# Patient Record
Sex: FEMALE | Race: WHITE | Employment: OTHER | URBAN - METROPOLITAN AREA
[De-identification: names, ages, dates, MRNs, and addresses within clinical notes are randomized per-mention and may not be internally consistent; named-entity substitution may affect disease eponyms.]

---

## 2017-08-29 PROBLEM — M17.11 PRIMARY OSTEOARTHRITIS OF RIGHT KNEE: Status: ACTIVE | Noted: 2017-08-29

## 2018-04-19 ENCOUNTER — HOSPITAL ENCOUNTER (OUTPATIENT)
Dept: AUDIOLOGY | Age: 78
Discharge: HOME | End: 2018-04-19
Payer: MEDICAID

## 2018-04-19 PROCEDURE — 9990000010 HC NO CHARGE VISIT

## 2018-06-01 ENCOUNTER — HOSPITAL ENCOUNTER (OUTPATIENT)
Dept: AUDIOLOGY | Age: 78
Discharge: HOME | End: 2018-06-01
Payer: MEDICAID

## 2018-06-01 PROCEDURE — V5241 DISPENSING FEE, MONAURAL: HCPCS | Performed by: AUDIOLOGIST

## 2018-06-01 PROCEDURE — V5160 DISPENSING FEE BINAURAL: HCPCS

## 2018-06-01 PROCEDURE — V5261 HEARING AID, DIGIT, BIN, BTE: HCPCS | Performed by: AUDIOLOGIST

## 2018-06-13 ENCOUNTER — HOSPITAL ENCOUNTER (INPATIENT)
Age: 78
LOS: 2 days | Discharge: HOME | DRG: 393 | End: 2018-06-15
Attending: EMERGENCY MEDICINE | Admitting: INTERNAL MEDICINE
Payer: MEDICARE

## 2018-06-13 ENCOUNTER — APPOINTMENT (OUTPATIENT)
Dept: CT IMAGING | Age: 78
DRG: 393 | End: 2018-06-13
Payer: MEDICARE

## 2018-06-13 ENCOUNTER — APPOINTMENT (OUTPATIENT)
Dept: GENERAL RADIOLOGY | Age: 78
DRG: 393 | End: 2018-06-13
Payer: MEDICARE

## 2018-06-13 DIAGNOSIS — K62.5 RECTAL BLEEDING: Primary | ICD-10-CM

## 2018-06-13 PROBLEM — K92.2 GI BLEED: Status: ACTIVE | Noted: 2018-06-13

## 2018-06-13 PROBLEM — M17.11 PRIMARY OSTEOARTHRITIS OF RIGHT KNEE: Status: RESOLVED | Noted: 2017-08-29 | Resolved: 2018-06-13

## 2018-06-13 LAB
ABO/RH: NORMAL
ALBUMIN SERPL-MCNC: 3.8 G/DL (ref 3.5–5.2)
ALP BLD-CCNC: 152 U/L (ref 35–104)
ALT SERPL-CCNC: 23 U/L (ref 0–32)
ANION GAP SERPL CALCULATED.3IONS-SCNC: 13 MMOL/L (ref 7–16)
ANTIBODY SCREEN: NORMAL
APTT: 25.6 SEC (ref 24.5–35.1)
AST SERPL-CCNC: 27 U/L (ref 0–31)
BASOPHILS ABSOLUTE: 0.07 E9/L (ref 0–0.2)
BASOPHILS RELATIVE PERCENT: 0.7 % (ref 0–2)
BILIRUB SERPL-MCNC: 0.3 MG/DL (ref 0–1.2)
BUN BLDV-MCNC: 25 MG/DL (ref 8–23)
CALCIUM SERPL-MCNC: 9.4 MG/DL (ref 8.6–10.2)
CHLORIDE BLD-SCNC: 101 MMOL/L (ref 98–107)
CO2: 21 MMOL/L (ref 22–29)
CREAT SERPL-MCNC: 0.9 MG/DL (ref 0.5–1)
EOSINOPHILS ABSOLUTE: 0.07 E9/L (ref 0.05–0.5)
EOSINOPHILS RELATIVE PERCENT: 0.7 % (ref 0–6)
GFR AFRICAN AMERICAN: >60
GFR NON-AFRICAN AMERICAN: >60 ML/MIN/1.73
GLUCOSE BLD-MCNC: 109 MG/DL (ref 74–109)
HCT VFR BLD CALC: 40.5 % (ref 34–48)
HEMOGLOBIN: 13.5 G/DL (ref 11.5–15.5)
IMMATURE GRANULOCYTES #: 0.03 E9/L
IMMATURE GRANULOCYTES %: 0.3 % (ref 0–5)
INR BLD: 1.1
LIPASE: 10 U/L (ref 13–60)
LYMPHOCYTES ABSOLUTE: 1.92 E9/L (ref 1.5–4)
LYMPHOCYTES RELATIVE PERCENT: 18.3 % (ref 20–42)
MCH RBC QN AUTO: 30.3 PG (ref 26–35)
MCHC RBC AUTO-ENTMCNC: 33.3 % (ref 32–34.5)
MCV RBC AUTO: 90.8 FL (ref 80–99.9)
MONOCYTES ABSOLUTE: 0.77 E9/L (ref 0.1–0.95)
MONOCYTES RELATIVE PERCENT: 7.3 % (ref 2–12)
NEUTROPHILS ABSOLUTE: 7.65 E9/L (ref 1.8–7.3)
NEUTROPHILS RELATIVE PERCENT: 72.7 % (ref 43–80)
PDW BLD-RTO: 12.5 FL (ref 11.5–15)
PLATELET # BLD: 176 E9/L (ref 130–450)
PMV BLD AUTO: 10.4 FL (ref 7–12)
POTASSIUM SERPL-SCNC: 4.7 MMOL/L (ref 3.5–5)
PROTHROMBIN TIME: 12.6 SEC (ref 9.3–12.4)
RBC # BLD: 4.46 E12/L (ref 3.5–5.5)
SODIUM BLD-SCNC: 135 MMOL/L (ref 132–146)
TOTAL PROTEIN: 7 G/DL (ref 6.4–8.3)
TROPONIN: <0.01 NG/ML (ref 0–0.03)
WBC # BLD: 10.5 E9/L (ref 4.5–11.5)

## 2018-06-13 PROCEDURE — 2060000000 HC ICU INTERMEDIATE R&B

## 2018-06-13 PROCEDURE — 2580000003 HC RX 258: Performed by: INTERNAL MEDICINE

## 2018-06-13 PROCEDURE — 2580000003 HC RX 258: Performed by: EMERGENCY MEDICINE

## 2018-06-13 PROCEDURE — 6370000000 HC RX 637 (ALT 250 FOR IP): Performed by: EMERGENCY MEDICINE

## 2018-06-13 PROCEDURE — G0378 HOSPITAL OBSERVATION PER HR: HCPCS

## 2018-06-13 PROCEDURE — 6360000002 HC RX W HCPCS: Performed by: INTERNAL MEDICINE

## 2018-06-13 PROCEDURE — 74176 CT ABD & PELVIS W/O CONTRAST: CPT

## 2018-06-13 PROCEDURE — 86850 RBC ANTIBODY SCREEN: CPT

## 2018-06-13 PROCEDURE — 86901 BLOOD TYPING SEROLOGIC RH(D): CPT

## 2018-06-13 PROCEDURE — 6370000000 HC RX 637 (ALT 250 FOR IP): Performed by: INTERNAL MEDICINE

## 2018-06-13 PROCEDURE — 99285 EMERGENCY DEPT VISIT HI MDM: CPT

## 2018-06-13 PROCEDURE — 71045 X-RAY EXAM CHEST 1 VIEW: CPT

## 2018-06-13 PROCEDURE — 93005 ELECTROCARDIOGRAM TRACING: CPT

## 2018-06-13 PROCEDURE — 85610 PROTHROMBIN TIME: CPT

## 2018-06-13 PROCEDURE — 80053 COMPREHEN METABOLIC PANEL: CPT

## 2018-06-13 PROCEDURE — 86900 BLOOD TYPING SEROLOGIC ABO: CPT

## 2018-06-13 PROCEDURE — 85025 COMPLETE CBC W/AUTO DIFF WBC: CPT

## 2018-06-13 PROCEDURE — 84484 ASSAY OF TROPONIN QUANT: CPT

## 2018-06-13 PROCEDURE — 83690 ASSAY OF LIPASE: CPT

## 2018-06-13 PROCEDURE — 96374 THER/PROPH/DIAG INJ IV PUSH: CPT

## 2018-06-13 PROCEDURE — 85730 THROMBOPLASTIN TIME PARTIAL: CPT

## 2018-06-13 RX ORDER — PROPRANOLOL HYDROCHLORIDE 20 MG/1
20 TABLET ORAL 3 TIMES DAILY
Status: DISCONTINUED | OUTPATIENT
Start: 2018-06-13 | End: 2018-06-15 | Stop reason: HOSPADM

## 2018-06-13 RX ORDER — LISINOPRIL 10 MG/1
10 TABLET ORAL DAILY
Status: DISCONTINUED | OUTPATIENT
Start: 2018-06-14 | End: 2018-06-15

## 2018-06-13 RX ORDER — DIAZEPAM 5 MG/1
10 TABLET ORAL EVERY 12 HOURS PRN
Status: DISCONTINUED | OUTPATIENT
Start: 2018-06-13 | End: 2018-06-15 | Stop reason: HOSPADM

## 2018-06-13 RX ORDER — ACETAMINOPHEN 325 MG/1
650 TABLET ORAL EVERY 4 HOURS PRN
Status: DISCONTINUED | OUTPATIENT
Start: 2018-06-13 | End: 2018-06-15 | Stop reason: HOSPADM

## 2018-06-13 RX ORDER — VALSARTAN 160 MG/1
160 TABLET ORAL DAILY
Status: DISCONTINUED | OUTPATIENT
Start: 2018-06-14 | End: 2018-06-15 | Stop reason: HOSPADM

## 2018-06-13 RX ORDER — SODIUM CHLORIDE 0.9 % (FLUSH) 0.9 %
10 SYRINGE (ML) INJECTION PRN
Status: DISCONTINUED | OUTPATIENT
Start: 2018-06-13 | End: 2018-06-15 | Stop reason: HOSPADM

## 2018-06-13 RX ORDER — PHENYTOIN SODIUM 100 MG/1
100 CAPSULE, EXTENDED RELEASE ORAL 2 TIMES DAILY
Status: DISCONTINUED | OUTPATIENT
Start: 2018-06-13 | End: 2018-06-15 | Stop reason: HOSPADM

## 2018-06-13 RX ORDER — SODIUM CHLORIDE 9 MG/ML
INJECTION, SOLUTION INTRAVENOUS CONTINUOUS
Status: DISCONTINUED | OUTPATIENT
Start: 2018-06-13 | End: 2018-06-15

## 2018-06-13 RX ORDER — 0.9 % SODIUM CHLORIDE 0.9 %
500 INTRAVENOUS SOLUTION INTRAVENOUS ONCE
Status: COMPLETED | OUTPATIENT
Start: 2018-06-13 | End: 2018-06-13

## 2018-06-13 RX ORDER — PANTOPRAZOLE SODIUM 40 MG/10ML
40 INJECTION, POWDER, LYOPHILIZED, FOR SOLUTION INTRAVENOUS DAILY
Status: DISCONTINUED | OUTPATIENT
Start: 2018-06-14 | End: 2018-06-15 | Stop reason: HOSPADM

## 2018-06-13 RX ORDER — LISINOPRIL 10 MG/1
10 TABLET ORAL DAILY
Status: ON HOLD | COMMUNITY
End: 2018-06-15 | Stop reason: HOSPADM

## 2018-06-13 RX ORDER — HYDROCHLOROTHIAZIDE 25 MG/1
25 TABLET ORAL DAILY
Status: DISCONTINUED | OUTPATIENT
Start: 2018-06-14 | End: 2018-06-15 | Stop reason: HOSPADM

## 2018-06-13 RX ORDER — PHENYTOIN SODIUM 100 MG/1
100 CAPSULE, EXTENDED RELEASE ORAL ONCE
Status: COMPLETED | OUTPATIENT
Start: 2018-06-13 | End: 2018-06-13

## 2018-06-13 RX ORDER — ONDANSETRON 2 MG/ML
4 INJECTION INTRAMUSCULAR; INTRAVENOUS EVERY 6 HOURS PRN
Status: DISCONTINUED | OUTPATIENT
Start: 2018-06-13 | End: 2018-06-15 | Stop reason: HOSPADM

## 2018-06-13 RX ORDER — SODIUM CHLORIDE 0.9 % (FLUSH) 0.9 %
10 SYRINGE (ML) INJECTION EVERY 12 HOURS SCHEDULED
Status: DISCONTINUED | OUTPATIENT
Start: 2018-06-13 | End: 2018-06-15 | Stop reason: HOSPADM

## 2018-06-13 RX ORDER — ZOLPIDEM TARTRATE 5 MG/1
5 TABLET ORAL NIGHTLY PRN
Status: ON HOLD | COMMUNITY
End: 2019-07-01

## 2018-06-13 RX ORDER — LANOLIN ALCOHOL/MO/W.PET/CERES
3 CREAM (GRAM) TOPICAL NIGHTLY PRN
Status: ON HOLD | COMMUNITY
End: 2018-06-13 | Stop reason: CLARIF

## 2018-06-13 RX ORDER — LEVOTHYROXINE SODIUM 0.03 MG/1
25 TABLET ORAL DAILY
Status: DISCONTINUED | OUTPATIENT
Start: 2018-06-14 | End: 2018-06-15 | Stop reason: HOSPADM

## 2018-06-13 RX ORDER — OXYCODONE HYDROCHLORIDE 15 MG/1
15 TABLET ORAL EVERY 6 HOURS
Status: DISCONTINUED | OUTPATIENT
Start: 2018-06-13 | End: 2018-06-15 | Stop reason: HOSPADM

## 2018-06-13 RX ADMIN — Medication 10 ML: at 23:50

## 2018-06-13 RX ADMIN — SODIUM CHLORIDE 500 ML: 9 INJECTION, SOLUTION INTRAVENOUS at 19:20

## 2018-06-13 RX ADMIN — SODIUM CHLORIDE: 9 INJECTION, SOLUTION INTRAVENOUS at 23:40

## 2018-06-13 RX ADMIN — ONDANSETRON HYDROCHLORIDE 4 MG: 2 INJECTION, SOLUTION INTRAMUSCULAR; INTRAVENOUS at 23:48

## 2018-06-13 RX ADMIN — DIAZEPAM 10 MG: 5 TABLET ORAL at 23:40

## 2018-06-13 RX ADMIN — PROPRANOLOL HYDROCHLORIDE 20 MG: 20 TABLET ORAL at 23:39

## 2018-06-13 RX ADMIN — PHENYTOIN SODIUM 100 MG: 100 CAPSULE ORAL at 22:20

## 2018-06-13 ASSESSMENT — PAIN DESCRIPTION - LOCATION
LOCATION: ABDOMEN
LOCATION: ABDOMEN;BACK

## 2018-06-13 ASSESSMENT — PAIN DESCRIPTION - ONSET: ONSET: ON-GOING

## 2018-06-13 ASSESSMENT — PAIN SCALES - GENERAL
PAINLEVEL_OUTOF10: 3
PAINLEVEL_OUTOF10: 8

## 2018-06-13 ASSESSMENT — PAIN DESCRIPTION - PAIN TYPE: TYPE: ACUTE PAIN

## 2018-06-13 ASSESSMENT — PAIN DESCRIPTION - ORIENTATION
ORIENTATION: LOWER
ORIENTATION: LOWER

## 2018-06-13 ASSESSMENT — PAIN DESCRIPTION - FREQUENCY
FREQUENCY: INTERMITTENT
FREQUENCY: INTERMITTENT

## 2018-06-13 ASSESSMENT — PAIN DESCRIPTION - DESCRIPTORS
DESCRIPTORS: DISCOMFORT
DESCRIPTORS: SHARP

## 2018-06-14 ENCOUNTER — ANESTHESIA EVENT (OUTPATIENT)
Dept: ENDOSCOPY | Age: 78
DRG: 393 | End: 2018-06-14
Payer: MEDICARE

## 2018-06-14 LAB
ALBUMIN SERPL-MCNC: 3.4 G/DL (ref 3.5–5.2)
ALP BLD-CCNC: 130 U/L (ref 35–104)
ALT SERPL-CCNC: 17 U/L (ref 0–32)
ANION GAP SERPL CALCULATED.3IONS-SCNC: 13 MMOL/L (ref 7–16)
AST SERPL-CCNC: 20 U/L (ref 0–31)
BASOPHILS ABSOLUTE: 0.07 E9/L (ref 0–0.2)
BASOPHILS RELATIVE PERCENT: 0.8 % (ref 0–2)
BILIRUB SERPL-MCNC: 0.3 MG/DL (ref 0–1.2)
BUN BLDV-MCNC: 18 MG/DL (ref 8–23)
CALCIUM SERPL-MCNC: 8.8 MG/DL (ref 8.6–10.2)
CHLORIDE BLD-SCNC: 105 MMOL/L (ref 98–107)
CO2: 22 MMOL/L (ref 22–29)
CREAT SERPL-MCNC: 0.9 MG/DL (ref 0.5–1)
EOSINOPHILS ABSOLUTE: 0.1 E9/L (ref 0.05–0.5)
EOSINOPHILS RELATIVE PERCENT: 1.1 % (ref 0–6)
GFR AFRICAN AMERICAN: >60
GFR NON-AFRICAN AMERICAN: >60 ML/MIN/1.73
GLUCOSE BLD-MCNC: 101 MG/DL (ref 74–109)
HCT VFR BLD CALC: 34.5 % (ref 34–48)
HEMOGLOBIN: 11.2 G/DL (ref 11.5–15.5)
IMMATURE GRANULOCYTES #: 0.03 E9/L
IMMATURE GRANULOCYTES %: 0.3 % (ref 0–5)
INR BLD: 1.1
LYMPHOCYTES ABSOLUTE: 2.52 E9/L (ref 1.5–4)
LYMPHOCYTES RELATIVE PERCENT: 27.4 % (ref 20–42)
MAGNESIUM: 1.7 MG/DL (ref 1.6–2.6)
MCH RBC QN AUTO: 29.9 PG (ref 26–35)
MCHC RBC AUTO-ENTMCNC: 32.5 % (ref 32–34.5)
MCV RBC AUTO: 92.2 FL (ref 80–99.9)
MONOCYTES ABSOLUTE: 0.88 E9/L (ref 0.1–0.95)
MONOCYTES RELATIVE PERCENT: 9.6 % (ref 2–12)
NEUTROPHILS ABSOLUTE: 5.6 E9/L (ref 1.8–7.3)
NEUTROPHILS RELATIVE PERCENT: 60.8 % (ref 43–80)
PDW BLD-RTO: 12.6 FL (ref 11.5–15)
PLATELET # BLD: 145 E9/L (ref 130–450)
PMV BLD AUTO: 10.5 FL (ref 7–12)
POTASSIUM SERPL-SCNC: 3.8 MMOL/L (ref 3.5–5)
PROTHROMBIN TIME: 12.7 SEC (ref 9.3–12.4)
RBC # BLD: 3.74 E12/L (ref 3.5–5.5)
SODIUM BLD-SCNC: 140 MMOL/L (ref 132–146)
TOTAL PROTEIN: 6.1 G/DL (ref 6.4–8.3)
WBC # BLD: 9.2 E9/L (ref 4.5–11.5)

## 2018-06-14 PROCEDURE — 83735 ASSAY OF MAGNESIUM: CPT

## 2018-06-14 PROCEDURE — 2060000000 HC ICU INTERMEDIATE R&B

## 2018-06-14 PROCEDURE — 6370000000 HC RX 637 (ALT 250 FOR IP): Performed by: STUDENT IN AN ORGANIZED HEALTH CARE EDUCATION/TRAINING PROGRAM

## 2018-06-14 PROCEDURE — 6370000000 HC RX 637 (ALT 250 FOR IP): Performed by: INTERNAL MEDICINE

## 2018-06-14 PROCEDURE — 80053 COMPREHEN METABOLIC PANEL: CPT

## 2018-06-14 PROCEDURE — 85610 PROTHROMBIN TIME: CPT

## 2018-06-14 PROCEDURE — 6360000002 HC RX W HCPCS: Performed by: INTERNAL MEDICINE

## 2018-06-14 PROCEDURE — C9113 INJ PANTOPRAZOLE SODIUM, VIA: HCPCS | Performed by: INTERNAL MEDICINE

## 2018-06-14 PROCEDURE — G0378 HOSPITAL OBSERVATION PER HR: HCPCS

## 2018-06-14 PROCEDURE — 36415 COLL VENOUS BLD VENIPUNCTURE: CPT

## 2018-06-14 PROCEDURE — 2580000003 HC RX 258: Performed by: INTERNAL MEDICINE

## 2018-06-14 PROCEDURE — 96376 TX/PRO/DX INJ SAME DRUG ADON: CPT

## 2018-06-14 PROCEDURE — 85025 COMPLETE CBC W/AUTO DIFF WBC: CPT

## 2018-06-14 PROCEDURE — 96375 TX/PRO/DX INJ NEW DRUG ADDON: CPT

## 2018-06-14 RX ADMIN — PROPRANOLOL HYDROCHLORIDE 20 MG: 20 TABLET ORAL at 20:54

## 2018-06-14 RX ADMIN — ONDANSETRON HYDROCHLORIDE 4 MG: 2 INJECTION, SOLUTION INTRAMUSCULAR; INTRAVENOUS at 15:42

## 2018-06-14 RX ADMIN — LEVOTHYROXINE SODIUM 25 MCG: 25 TABLET ORAL at 06:19

## 2018-06-14 RX ADMIN — BISACODYL 10 MG: 5 TABLET, COATED ORAL at 20:56

## 2018-06-14 RX ADMIN — OXYCODONE HYDROCHLORIDE 15 MG: 15 TABLET ORAL at 10:57

## 2018-06-14 RX ADMIN — OXYCODONE HYDROCHLORIDE 15 MG: 15 TABLET ORAL at 23:32

## 2018-06-14 RX ADMIN — ACETAMINOPHEN 650 MG: 325 TABLET ORAL at 06:19

## 2018-06-14 RX ADMIN — SODIUM CHLORIDE: 9 INJECTION, SOLUTION INTRAVENOUS at 13:17

## 2018-06-14 RX ADMIN — OXYCODONE HYDROCHLORIDE 15 MG: 15 TABLET ORAL at 17:54

## 2018-06-14 RX ADMIN — LISINOPRIL 10 MG: 10 TABLET ORAL at 08:40

## 2018-06-14 RX ADMIN — PROPRANOLOL HYDROCHLORIDE 20 MG: 20 TABLET ORAL at 08:41

## 2018-06-14 RX ADMIN — MAGESIUM CITRATE 600 ML: 1.75 LIQUID ORAL at 19:19

## 2018-06-14 RX ADMIN — VALSARTAN 160 MG: 160 TABLET ORAL at 08:41

## 2018-06-14 RX ADMIN — MAGESIUM CITRATE 600 ML: 1.75 LIQUID ORAL at 15:37

## 2018-06-14 RX ADMIN — Medication 10 ML: at 08:40

## 2018-06-14 RX ADMIN — PROPRANOLOL HYDROCHLORIDE 20 MG: 20 TABLET ORAL at 15:36

## 2018-06-14 RX ADMIN — PANTOPRAZOLE SODIUM 40 MG: 40 INJECTION, POWDER, FOR SOLUTION INTRAVENOUS at 08:40

## 2018-06-14 RX ADMIN — BISACODYL 10 MG: 5 TABLET, COATED ORAL at 17:54

## 2018-06-14 RX ADMIN — PHENYTOIN SODIUM 100 MG: 100 CAPSULE ORAL at 08:40

## 2018-06-14 RX ADMIN — PHENYTOIN SODIUM 100 MG: 100 CAPSULE ORAL at 20:56

## 2018-06-14 ASSESSMENT — ENCOUNTER SYMPTOMS: SHORTNESS OF BREATH: 0

## 2018-06-14 ASSESSMENT — PAIN DESCRIPTION - LOCATION
LOCATION: BACK
LOCATION: ABDOMEN
LOCATION: ABDOMEN;BACK

## 2018-06-14 ASSESSMENT — PAIN DESCRIPTION - DESCRIPTORS
DESCRIPTORS: ACHING;DISCOMFORT
DESCRIPTORS: ACHING;SORE;DISCOMFORT

## 2018-06-14 ASSESSMENT — PAIN SCALES - GENERAL
PAINLEVEL_OUTOF10: 2
PAINLEVEL_OUTOF10: 4
PAINLEVEL_OUTOF10: 7
PAINLEVEL_OUTOF10: 6
PAINLEVEL_OUTOF10: 2
PAINLEVEL_OUTOF10: 7

## 2018-06-14 ASSESSMENT — PAIN DESCRIPTION - PAIN TYPE
TYPE: CHRONIC PAIN
TYPE: ACUTE PAIN;CHRONIC PAIN
TYPE: ACUTE PAIN

## 2018-06-14 ASSESSMENT — PAIN DESCRIPTION - FREQUENCY: FREQUENCY: INTERMITTENT

## 2018-06-14 ASSESSMENT — PAIN DESCRIPTION - ORIENTATION
ORIENTATION: LOWER
ORIENTATION: LOWER

## 2018-06-14 ASSESSMENT — PAIN DESCRIPTION - ONSET: ONSET: ON-GOING

## 2018-06-14 NOTE — CARE COORDINATION
Social work / Discharge Planning:       Social work met with patient for initial assessment. She lives with her  in a apartment using no DME (she owns a cane). She has used [facility] and had a past stay at [facility]. Patient has assistance in her home with caregivers arranged by  [address] [place] [address] on Aging twice per [facility] for 3 hours. Per patient, she has already informed her caregivers that she is in the hospital.  She currently denies having any discharge needs. Social work will follow.  Electronically signed by GUERO Parson on 6/14/2018 at 1:31 PM

## 2018-06-14 NOTE — CONSULTS
multiple    CERVICAL FUSION  ?   SECTION      CHOLECYSTECTOMY      lap    COLONOSCOPY      EYE SURGERY Bilateral     cataract extraction    JOINT REPLACEMENT  2010    left    KNEE ARTHROPLASTY Right 2017    Total right knee arthroplasty    LUMBAR LAMINECTOMY  1985   [address]      Neck    SPINE SURGERY      synovial cyst    SPINE SURGERY  2012    rebuilt talibone    TONSILLECTOMY         Medications Prior to Admission:    Prior to Admission medications    Medication Sig Start Date End Date Taking? Authorizing Provider   lisinopril (PRINIVIL;ZESTRIL) 10 MG tablet Take 10 mg by mouth daily   Yes Historical Provider, MD   zolpidem (AMBIEN) 5 MG tablet Take 5 mg by mouth nightly as needed for Sleep. .   Yes Historical Provider, MD   OxyCODONE HCl (OXYCONTIN PO) Take 15 mg by mouth every 6 hours     Historical Provider, MD   ondansetron (ZOFRAN ODT) 4 MG disintegrating tablet Take 1 tablet by mouth every 8 hours as needed for Nausea or Vomiting 10/31/17 10/31/18  Rupert Bates DO   omeprazole (PRILOSEC) 20 MG delayed release capsule Take 1 capsule by mouth every 12 hours for 14 days 10/31/17 11/14/17  Rupert Bates DO   diazepam (VALIUM) 10 MG tablet Take 1 tablet by mouth every 12 hours as needed for Anxiety 9/10/17   Wendall Spatz, MD   phenytoin (DILANTIN) 100 MG ER capsule Take 1 capsule by mouth 2 times daily 9/10/17   Wendall Spatz, MD   propranolol (INDERAL) 20 MG tablet Take 20 mg by mouth 3 times daily Instructed to take morning of surgery with a sip of water    Historical Provider, MD   valsartan (DIOVAN) 160 MG tablet Take 160 mg by mouth daily    Historical Provider, MD   hydrochlorothiazide (HYDRODIURIL) 25 MG tablet Take 25 mg by mouth daily  17   Historical Provider, MD   aspirin 81 MG EC tablet Take 1 tablet by mouth 2 times daily 16   Kp Guzman MD   atorvastatin (LIPITOR) 10 MG tablet Function  Recent Labs      18   1822   LIPASE  10*   BILITOT  0.3   AST  27   ALT  23   ALKPHOS  152*   PROT  7.0   LABALBU  3.8     No results for input(s): LACTATE in the last 72 hours. Recent Labs      18   1822   INR  1.1       RADIOLOGY  Ct Abdomen Pelvis Wo Contrast    Result Date: 2018  Patient MRN:  [id number] : 1940 Age: 69 years Gender: Female Order Date:  2018 6:15 PM EXAM: CT ABDOMEN PELVIS WO CONTRAST NUMBER OF IMAGES:  364 INDICATION:  ABDOMINAL PAIN  COMPARISON: None TECHNIQUE: Helical CT scan of the abdomen and pelvis was performed from the domes of the diaphragms through the pelvis. No IV or oral contrast was administered. Coronal  and sagittal reconstructions were reviewed. Low-dose CT  acquisition technique included one of following options; 1 . Automated exposure control, 2. Adjustment of MA and or KV according to patient's size or 3. Use of iterative reconstruction. FINDINGS: The visible lower lung fields are clear. There is a 5 mm calcified granuloma at the base of the left lower lobe anteriorly. There is a small hiatal hernia. The liver and spleen appear normal in size and texture with no focal lesions seen. The gallbladder is surgically absent. There is severe fatty infiltration of the pancreas. The bile ducts are nondilated. No adrenal masses are seen. The kidneys are mildly atrophic but no renal masses, calculi or hydronephrosis is seen. The abdominal aorta is mildly atherosclerotic normal in caliber. No retroperitoneal or pelvic masses or lymphadenopathy is seen. The urinary bladder appears unremarkable. The uterus is present and atrophic. No free fluid is identified. There were multiple diverticuli involving the distal descending and sigmoid colon. No inflammatory changes are seen to indicate acute diverticulitis. The remainder of the colon and the small bowel loops appear normal. There is no bowel obstruction. No free intraperitoneal air.  There are

## 2018-06-14 NOTE — PLAN OF CARE
Problem: Falls - Risk of:  Goal: Will remain free from falls  Will remain free from falls   Outcome: Met This Shift    Goal: Absence of physical injury  Absence of physical injury   Outcome: Met This Shift

## 2018-06-14 NOTE — H&P
History and Physical      CHIEF COMPLAINT:  Rectal bleeding      HISTORY OF PRESENT ILLNESS:      The patient is a 69 y.o. female patient of Dr Gerri Crespo who presents with rectal bleeding from home. She states that she has been constipated for the last 3 days. On the day of admission she had a hard bowel movement and developed bright red rectal bleeding. This was followed by nausea and vomiting. She was brought to the hospital for evaluation. She states she had endoscopy 4 years ago. Results were apparently normal. She denies any fever or chills diarrhea hematemesis or melena. She has abdominal pain located in the left lower quadrant radiating into the left flank. Her symptoms were sudden in onset and severe. The bleeding appears to have been exacerbated by constipation. She was found to be Hemoccult positive in the emergency room. Past Medical History:    Past Medical History:   Diagnosis Date    Anxiety and depression 7/13/2016    Arthritis     osteo    Asthma     has chronic couch    Chronic back pain     Chronic kidney disease (CKD), stage II (mild) 04/19/2012    Creatinine around 1.3 to 1.4 in April 2012 secondary to IV antibiotics following back surgery.  Chronic osteomyelitis of lumbar spine ([address]     note on chart from Dr. Elmo Fernandes dated 8/17/2017    Foot pain     after back surgery / chronic    HTN (hypertension), benign 1/10/2015    Hx of blood clots     2011 / DVT, PE    Hx of migraines     Hyperlipidemia     Hypothyroidism     Hypothyroidism 7/13/2016    Neuropathy of leg     bilateral    Other postoperative infection 08/2011    Was on Vancomycin following the surgery for the removal of lumbar cysts.  Patient on antibiotics for life     Radiculopathy, lumbar region 01/16/2012    Seizures ([address]     last seizure 11/2016 / gran mal    Sleeping difficulties     takes [place]    Spinal stenosis of lumbar region     Synovial cyst of lumbar spine 07/05/2011    Thrombocytopenia ([address] Gabapentin; Lyrica [pregabalin]; and Skelaxin [metaxalone]    Social History:    reports that she has never smoked. She has never used smokeless tobacco. She reports that she does not drink alcohol or use drugs. Family History:   family history includes Cirrhosis in her brother; Heart Attack in her father; Other in her mother. REVIEW OF SYSTEMS    Constitutional: negative for chills and fevers  Eyes: negative for icterus and redness  Ears, nose, mouth, throat, and face: negative for epistaxis, hearing loss, nasal congestion, sore mouth, sore throat and tinnitus  Respiratory: negative for cough and hemoptysis  Cardiovascular: negative for chest pain and palpitations  Gastrointestinal: positive for abdominal pain, constipation and vomiting, negative for diarrhea and jaundice  Genitourinary:negative for dysuria and frequency  Integument/breast: negative for pruritus and rash  Hematologic/lymphatic: negative for bleeding and easy bruising  Musculoskeletal:positive for back pain, negative for neck pain  Neurological: negative for coordination problems and dizziness  Behavioral/Psych: Positive for anxiety and depression  Endocrine: negative for temperature intolerance  Allergic/Immunologic: negative for anaphylaxis and angioedema    PHYSICAL EXAM:    Vitals:  /60   Pulse 80   Temp 98.3 °F (36.8 °C) (Oral)   Resp 18   Ht 5' (1.524 m)   Wt 181 lb 4.8 oz (82.2 kg)   SpO2 96%   BMI 35.41 kg/m²     General appearance: alert, appears stated age and cooperative  Head: Normocephalic, without obvious abnormality, atraumatic  Eyes: conjunctivae/corneas clear. PERRL, EOM's intact. Fundi benign. Ears: normal TM's and external ear canals both ears  Nose: Nares normal. Septum midline. Mucosa normal. No drainage or sinus tenderness.   Throat: lips, mucosa, and tongue normal; teeth and gums normal  Neck: no adenopathy, no carotid bruit, no JVD, supple, symmetrical, trachea midline and thyroid not enlarged, symmetric, no tenderness/mass/nodules  Lungs: clear to auscultation bilaterally  Heart: regular rate and rhythm, S1, S2 normal, no murmur, click, rub or gallop  Abdomen: Soft, tender left lower quadrant, no guarding or rebound, bowel sounds hypoactive, no organomegaly or masses  Extremities: extremities normal, atraumatic, no cyanosis or edema  Pulses: 2+ and symmetric  Skin: Skin color, texture, turgor normal. No rashes or lesions  Neurologic: Grossly normal    LABS:    CBC with Differential:    Lab Results   Component Value Date    WBC 9.2 06/14/2018    RBC 3.74 06/14/2018    HGB 11.2 06/14/2018    HCT 34.5 06/14/2018     06/14/2018    MCV 92.2 06/14/2018    MCH 29.9 06/14/2018    MCHC 32.5 06/14/2018    RDW 12.6 06/14/2018    SEGSPCT 54 07/24/2011    LYMPHOPCT 27.4 06/14/2018    MONOPCT 9.6 06/14/2018    BASOPCT 0.8 06/14/2018    MONOSABS 0.88 06/14/2018    LYMPHSABS 2.52 06/14/2018    EOSABS 0.10 06/14/2018    BASOSABS 0.07 06/14/2018     CMP:    Lab Results   Component Value Date     06/14/2018    K 3.8 06/14/2018     06/14/2018    CO2 22 06/14/2018    BUN 18 06/14/2018    CREATININE 0.9 06/14/2018    GFRAA >60 06/14/2018    LABGLOM >60 06/14/2018    GLUCOSE 101 06/14/2018    GLUCOSE 105 07/24/2011    PROT 6.1 06/14/2018    LABALBU 3.4 06/14/2018    LABALBU 3.3 07/24/2011    CALCIUM 8.8 06/14/2018    BILITOT 0.3 06/14/2018    ALKPHOS 130 06/14/2018    AST 20 06/14/2018    ALT 17 06/14/2018     Hepatic Function Panel:    Lab Results   Component Value Date    ALKPHOS 130 06/14/2018    ALT 17 06/14/2018    AST 20 06/14/2018    PROT 6.1 06/14/2018    BILITOT 0.3 06/14/2018    BILIDIR <0.2 12/10/2016    IBILI 0.0 12/10/2016    LABALBU 3.4 06/14/2018    LABALBU 3.3 07/24/2011     Troponin:    Lab Results   Component Value Date    TROPONINI <0.01 06/13/2018     Results for Elba Perea (MRN [id number]) as of 6/14/2018 09:19   Ref.  Range 6/13/2018 18:22   Prothrombin Time Latest Ref Range: 9.3 - 12.4 sec 12.6 bladder appears unremarkable. The   uterus is present and atrophic. No free fluid is identified.       There were multiple diverticuli involving the distal descending and   sigmoid colon. No inflammatory changes are seen to indicate acute   diverticulitis. The remainder of the colon and the small bowel loops   appear normal. There is no bowel obstruction. No free intraperitoneal   air.       There are postoperative changes involving the lumbar spine status post   laminectomies and posterior spinal fusion from L4 to S1. There are   posterior fixation rods and interpedicular screws in place as well as   intervertebral spacers. There is moderately severe degenerative disc   disease at L1-2 and L2-3 with associated spondylosis. The bones are   diffusely osteopenic.       CONCLUSION:       1. No acute intra-abdominal abnormality identified. 2. Status post cholecystectomy and severe fatty infiltration of the   pancreas. 3. Mild diverticulosis of the distal colon without evidence of acute   diverticulitis. 4. Mildly atrophic kidneys but no evidence of obstructive uropathy or   stone disease. 5. Small calcified granuloma in the left lower lobe. 6. Postoperative changes involving the lower lumbar spine.      6/13/2018  7:31 PM - Jose, Mhy Incoming Ekg Results From [facility]     Component Results     Component Value Ref Range & Units Status Collected Lab   Ventricular Rate 86  BPM Incomplete 06/13/2018  7:15 PM [facility]   Atrial Rate 86  BPM Incomplete 06/13/2018  7:15 PM [facility]   P-R Interval 178  ms Incomplete 06/13/2018  7:15 PM [facility]   QRS Duration 92  ms Incomplete 06/13/2018  7:15 PM [facility]   Q-T Interval 380  ms Incomplete 06/13/2018  7:15 PM [facility]   QTc Calculation (Bazett) 454  ms Incomplete 06/13/2018  7:15 PM [facility]   P Axis 27  degrees Incomplete 06/13/2018  7:15 PM [facility]   R Axis 16  degrees Incomplete 06/13/2018  7:15 PM [facility]   T Axis 36  degrees Incomplete 06/13/2018  7:15 PM [facility]

## 2018-06-14 NOTE — PROGRESS NOTES
Seen/examined  Medical records reviewed  Current medication list reviewed  Labs reviewed  Heart RRR  Lungs CTA b/l  Abdomen is soft without tenderness or guarding    Imaging: CT reviewed    Case discussed with the residents    Resident's note reviewed    Impression:  Rectal bleeding, probably diverticular    Plan:  Prep for scopes tomorrow      Brandon

## 2018-06-14 NOTE — ED PROVIDER NOTES
HPI:  18,   Time: 10:35 PM         Jonas Stearns is a 69 y.o. female presenting to the ED for rectal bleeding, beginning athis morning go. The complaint has been intermittent, moderate in severity, and worsened by nothing. Patient brought in by family after apparently having an episode of bright red rectal bleeding this morning. She initially had been constipated but states she did go to the bathroom and then she noticed a significant amount of bright red blood in toilet. ROS:   Pertinent positives and negatives are stated within HPI, all other systems reviewed and are negative.  --------------------------------------------- PAST HISTORY ---------------------------------------------  Past Medical History:  has a past medical history of Anxiety and depression; Arthritis; Asthma; Chronic back pain; Chronic kidney disease (CKD), stage II (mild); Chronic osteomyelitis of lumbar spine ([address] Foot pain; HTN (hypertension), benign; Hx of blood clots; Hx of migraines; Hyperlipidemia; Hypothyroidism; Hypothyroidism; Neuropathy of leg; Other postoperative infection; Radiculopathy, lumbar region; Seizures ([address] Sleeping difficulties; Spinal stenosis of lumbar region; Synovial cyst of lumbar spine; Thrombocytopenia ([address] Tremors of nervous system; Vitamin D deficiency; and Wears dentures. Past Surgical History:  has a past surgical history that includes lumbar laminectomy (); cervical fusion (?); joint replacement (); Spine surgery ( & ); Spine surgery (); Spine surgery (); Tonsillectomy; Colonoscopy;  section; Cholecystectomy (); eye surgery (Bilateral, ); back surgery; Spine surgery (); Knee Arthroplasty (Right, 2017); pr egd transoral biopsy single/multiple (N/A, 6/15/2018); and pr colonoscopy flx dx w/collj spec when pfrmd (N/A, 6/15/2018). Social History:  reports that she has never smoked.  She has never used smokeless tobacco. She reports that

## 2018-06-14 NOTE — PROGRESS NOTES
Patient helped to bathroom. Patient had a bowel movement, moderate amount of dark red blood noted in toilet. Denies any nausea, pain, or acute distress.

## 2018-06-15 ENCOUNTER — ANESTHESIA (OUTPATIENT)
Dept: ENDOSCOPY | Age: 78
DRG: 393 | End: 2018-06-15
Payer: MEDICARE

## 2018-06-15 VITALS
WEIGHT: 184.2 LBS | HEIGHT: 60 IN | OXYGEN SATURATION: 100 % | DIASTOLIC BLOOD PRESSURE: 63 MMHG | SYSTOLIC BLOOD PRESSURE: 118 MMHG | TEMPERATURE: 98.3 F | HEART RATE: 71 BPM | BODY MASS INDEX: 36.16 KG/M2 | RESPIRATION RATE: 18 BRPM

## 2018-06-15 VITALS — DIASTOLIC BLOOD PRESSURE: 54 MMHG | SYSTOLIC BLOOD PRESSURE: 94 MMHG | OXYGEN SATURATION: 98 %

## 2018-06-15 LAB
HCT VFR BLD CALC: 33.8 % (ref 34–48)
HEMOGLOBIN: 10.8 G/DL (ref 11.5–15.5)
INR BLD: 1.1
MCH RBC QN AUTO: 30.2 PG (ref 26–35)
MCHC RBC AUTO-ENTMCNC: 32 % (ref 32–34.5)
MCV RBC AUTO: 94.4 FL (ref 80–99.9)
PDW BLD-RTO: 12.7 FL (ref 11.5–15)
PLATELET # BLD: 148 E9/L (ref 130–450)
PMV BLD AUTO: 10.6 FL (ref 7–12)
PROTHROMBIN TIME: 12.8 SEC (ref 9.3–12.4)
RBC # BLD: 3.58 E12/L (ref 3.5–5.5)
WBC # BLD: 8.8 E9/L (ref 4.5–11.5)

## 2018-06-15 PROCEDURE — 6370000000 HC RX 637 (ALT 250 FOR IP): Performed by: INTERNAL MEDICINE

## 2018-06-15 PROCEDURE — 96376 TX/PRO/DX INJ SAME DRUG ADON: CPT

## 2018-06-15 PROCEDURE — G0378 HOSPITAL OBSERVATION PER HR: HCPCS

## 2018-06-15 PROCEDURE — 0DJD8ZZ INSPECTION OF LOWER INTESTINAL TRACT, VIA NATURAL OR ARTIFICIAL OPENING ENDOSCOPIC: ICD-10-PCS | Performed by: SURGERY

## 2018-06-15 PROCEDURE — 0DB68ZX EXCISION OF STOMACH, VIA NATURAL OR ARTIFICIAL OPENING ENDOSCOPIC, DIAGNOSTIC: ICD-10-PCS | Performed by: SURGERY

## 2018-06-15 PROCEDURE — 7100000011 HC PHASE II RECOVERY - ADDTL 15 MIN: Performed by: SURGERY

## 2018-06-15 PROCEDURE — 6360000002 HC RX W HCPCS: Performed by: NURSE ANESTHETIST, CERTIFIED REGISTERED

## 2018-06-15 PROCEDURE — 3609017100 HC EGD: Performed by: SURGERY

## 2018-06-15 PROCEDURE — 7100000010 HC PHASE II RECOVERY - FIRST 15 MIN: Performed by: SURGERY

## 2018-06-15 PROCEDURE — 3609009500 HC COLONOSCOPY DIAGNOSTIC OR SCREENING: Performed by: SURGERY

## 2018-06-15 PROCEDURE — 6360000002 HC RX W HCPCS: Performed by: INTERNAL MEDICINE

## 2018-06-15 PROCEDURE — 2580000003 HC RX 258: Performed by: NURSE ANESTHETIST, CERTIFIED REGISTERED

## 2018-06-15 PROCEDURE — 36415 COLL VENOUS BLD VENIPUNCTURE: CPT

## 2018-06-15 PROCEDURE — 3700000001 HC ADD 15 MINUTES (ANESTHESIA): Performed by: SURGERY

## 2018-06-15 PROCEDURE — 3700000000 HC ANESTHESIA ATTENDED CARE: Performed by: SURGERY

## 2018-06-15 PROCEDURE — 85610 PROTHROMBIN TIME: CPT

## 2018-06-15 PROCEDURE — C9113 INJ PANTOPRAZOLE SODIUM, VIA: HCPCS | Performed by: INTERNAL MEDICINE

## 2018-06-15 PROCEDURE — 85027 COMPLETE CBC AUTOMATED: CPT

## 2018-06-15 PROCEDURE — 2580000003 HC RX 258: Performed by: INTERNAL MEDICINE

## 2018-06-15 RX ORDER — SODIUM CHLORIDE 9 MG/ML
INJECTION, SOLUTION INTRAVENOUS CONTINUOUS PRN
Status: DISCONTINUED | OUTPATIENT
Start: 2018-06-15 | End: 2018-06-15 | Stop reason: SDUPTHER

## 2018-06-15 RX ORDER — PROPOFOL 10 MG/ML
INJECTION, EMULSION INTRAVENOUS PRN
Status: DISCONTINUED | OUTPATIENT
Start: 2018-06-15 | End: 2018-06-15 | Stop reason: SDUPTHER

## 2018-06-15 RX ADMIN — VALSARTAN 160 MG: 160 TABLET ORAL at 09:29

## 2018-06-15 RX ADMIN — PHENYTOIN SODIUM 100 MG: 100 CAPSULE ORAL at 09:28

## 2018-06-15 RX ADMIN — Medication 10 ML: at 09:27

## 2018-06-15 RX ADMIN — SODIUM CHLORIDE: 9 INJECTION, SOLUTION INTRAVENOUS at 07:55

## 2018-06-15 RX ADMIN — PROPRANOLOL HYDROCHLORIDE 20 MG: 20 TABLET ORAL at 09:29

## 2018-06-15 RX ADMIN — PANTOPRAZOLE SODIUM 40 MG: 40 INJECTION, POWDER, FOR SOLUTION INTRAVENOUS at 09:27

## 2018-06-15 RX ADMIN — SODIUM CHLORIDE: 9 INJECTION, SOLUTION INTRAVENOUS at 09:33

## 2018-06-15 RX ADMIN — PROPOFOL 200 MG: 10 INJECTION, EMULSION INTRAVENOUS at 08:00

## 2018-06-15 RX ADMIN — LISINOPRIL 10 MG: 10 TABLET ORAL at 09:32

## 2018-06-15 RX ADMIN — PROPRANOLOL HYDROCHLORIDE 20 MG: 20 TABLET ORAL at 13:41

## 2018-06-15 RX ADMIN — OXYCODONE HYDROCHLORIDE 15 MG: 15 TABLET ORAL at 13:41

## 2018-06-15 ASSESSMENT — PAIN SCALES - GENERAL: PAINLEVEL_OUTOF10: 9

## 2018-06-15 ASSESSMENT — PAIN DESCRIPTION - FREQUENCY: FREQUENCY: INTERMITTENT

## 2018-06-15 ASSESSMENT — PAIN DESCRIPTION - PAIN TYPE: TYPE: CHRONIC PAIN

## 2018-06-15 ASSESSMENT — PAIN DESCRIPTION - DESCRIPTORS: DESCRIPTORS: ACHING;SORE;DISCOMFORT

## 2018-06-15 ASSESSMENT — PAIN DESCRIPTION - PROGRESSION: CLINICAL_PROGRESSION: NOT CHANGED

## 2018-06-15 ASSESSMENT — PAIN DESCRIPTION - ONSET: ONSET: GRADUAL

## 2018-06-15 NOTE — DISCHARGE SUMMARY
normal.  At the GE junction, there was no evidence of a hiatal hernia. The stomach was deflated and the scope was withdrawn and removed.     Next, a digital rectal examination revealed no gross blood, normal tone, no mass was palpated. A lubricated colonoscope was inserted and advanced to the cecum without difficulty. The ileocecal valve and appendiceal orifice were identified and photographed. Prep was good. Cecum, ascending colon, hepatic flexure, transverse colon, splenic flexure, descending colon, sigmoid colon were all extensively inspected. There was patchy inflammatory changes at the splenic flexure. Mild diverticular disease was seen in the sigmoid. The proximal rectum was normal. The distal rectum was normal. The colon was deflated. The scope was removed. The patient tolerated the procedure well.     Impression: above     Plan: ok to resume diet. No abx needed for superficial ischemic colitis. No need for additional colon cancer screening        Diego Almendarez MD 6/15/2018, 8:17 AM               Laboratory:   Last 3 BMP  Recent Labs      06/13/18 1822  06/14/18   0422   NA  135  140   K  4.7  3.8   CL  101  105   CO2  21*  22   BUN  25*  18   CREATININE  0.9  0.9   GLUCOSE  109  101   CALCIUM  9.4  8.8       Last 3 CBC:  Recent Labs      06/13/18 1822  06/14/18   0325  06/15/18   0345   WBC  10.5  9.2  8.8   RBC  4.46  3.74  3.58   HGB  13.5  11.2*  10.8*   HCT  40.5  34.5  33.8*   MCV  90.8  92.2  94.4   MCH  30.3  29.9  30.2   MCHC  33.3  32.5  32.0   RDW  12.5  12.6  12.7   PLT  176  145  148   MPV  10.4  10.5  10.6       Recent Labs      06/13/18 1822  06/14/18   0422  06/15/18   0345   PROTIME  12.6*  12.7*  12.8*   INR  1.1  1.1  1.1       Hospital Course:     Admitted with rectal bleeding and seen by general surgery. H/H monitored closely and remained stable. Taken for upper and lower endoscopy with results as noted above.  Patient noted to have relatively low blood pressure and lisinopril discontinued as this may be contributing to her ischemic colitis. Discharged home on low fiber diet and instructed to follow-up with family doctor next week for blood pressure check. Patient will follow up general surgery at their discretion    Discharge Exam:  See progress note from today    Disposition: home    Patient Instructions:   Current Discharge Medication List      CONTINUE these medications which have NOT CHANGED    Details   zolpidem (AMBIEN) 5 MG tablet Take 5 mg by mouth nightly as needed for Sleep. .      OxyCODONE HCl (OXYCONTIN PO) Take 15 mg by mouth every 6 hours       ondansetron (ZOFRAN ODT) 4 MG disintegrating tablet Take 1 tablet by mouth every 8 hours as needed for Nausea or Vomiting  Qty: 10 tablet, Refills: 0      diazepam (VALIUM) 10 MG tablet Take 1 tablet by mouth every 12 hours as needed for Anxiety  Qty: 30 tablet, Refills: 0    Associated Diagnoses: Anxiety      phenytoin (DILANTIN) 100 MG ER capsule Take 1 capsule by mouth 2 times daily  Qty: 60 capsule, Refills: 0    Associated Diagnoses: Partial symptomatic epilepsy with simple partial seizures, not intractable, without status epilepticus (HCC)      propranolol (INDERAL) 20 MG tablet Take 20 mg by mouth 3 times daily Instructed to take morning of surgery with a sip of water      valsartan (DIOVAN) 160 MG tablet Take 160 mg by mouth daily      hydrochlorothiazide (HYDRODIURIL) 25 MG tablet Take 25 mg by mouth daily       aspirin 81 MG EC tablet Take 1 tablet by mouth 2 times daily  Qty: 60 tablet, Refills: 0      atorvastatin (LIPITOR) 10 MG tablet Take 1 tablet by mouth nightly  Qty: 30 tablet, Refills: 0      levothyroxine (SYNTHROID) 25 MCG tablet Take 25 mcg by mouth daily         STOP taking these medications       lisinopril (PRINIVIL;ZESTRIL) 10 MG tablet Comments:   Reason for Stopping:         omeprazole (PRILOSEC) 20 MG delayed release capsule Comments:   Reason for Stopping:             Activity: activity as tolerated  Diet: low fiber    Follow-up with Dr Unique Velásquez in 1 week. Note that over 30 minutes was spent in preparing discharge papers, discussing discharge with patient, medication review, etc.    Signed:  BRISSA Rodriguez  6/15/2018  2:07 PM

## 2018-06-15 NOTE — PROGRESS NOTES
Message sent to surgery regarding discharge planning.   Electronically signed by Andie Gamble RN on 6/15/2018 at 1:44 PM

## 2018-06-15 NOTE — PROGRESS NOTES
Subjective:    Awake and alert. No problems overnight. Denies chest pain or dyspnea. Denies abdominal pain. Tolerated endoscopy well    Objective:    /63   Pulse 71   Temp 98.3 °F (36.8 °C) (Oral)   Resp 18   Ht 5' (1.524 m)   Wt 184 lb 3.2 oz (83.6 kg)   SpO2 100%   BMI 35.97 kg/m²   Skin: Warm and dry  Neck: Supple, no JVD  Heart:  RRR, no murmurs, gallops, or rubs. Lungs:  CTA bilaterally, no wheeze, rales or rhonchi  Abd: bowel sounds present, nontender, nondistended, no masses  Extrem:  No clubbing, cyanosis, or edema, pulses intact    I/O last 3 completed shifts:   In: 5470 [I.V.:1783]  Out: -     Laboratory:     CBC with Differential:    Lab Results   Component Value Date    WBC 8.8 06/15/2018    RBC 3.58 06/15/2018    HGB 10.8 06/15/2018    HCT 33.8 06/15/2018     06/15/2018    MCV 94.4 06/15/2018    MCH 30.2 06/15/2018    MCHC 32.0 06/15/2018    RDW 12.7 06/15/2018    SEGSPCT 54 07/24/2011    LYMPHOPCT 27.4 06/14/2018    MONOPCT 9.6 06/14/2018    BASOPCT 0.8 06/14/2018    MONOSABS 0.88 06/14/2018    LYMPHSABS 2.52 06/14/2018    EOSABS 0.10 06/14/2018    BASOSABS 0.07 06/14/2018     PT/INR:    Lab Results   Component Value Date    PROTIME 12.8 06/15/2018    PROTIME 17.0 07/09/2011    INR 1.1 06/15/2018        Current Facility-Administered Medications   Medication Dose Route Frequency Provider Last Rate Last Dose    diazepam (VALIUM) tablet 10 mg  10 mg Oral Q12H PRN Alethea Angulo MD   10 mg at 06/13/18 2340    hydrochlorothiazide (HYDRODIURIL) tablet 25 mg  25 mg Oral Daily Alethea Angulo MD        levothyroxine (SYNTHROID) tablet 25 mcg  25 mcg Oral Daily Alethea Angulo MD   Stopped at 06/15/18 4870    lisinopril (PRINIVIL;ZESTRIL) tablet 10 mg  10 mg Oral Daily Alethea Angulo MD   10 mg at 06/15/18 0932    oxyCODONE (OXY-IR) immediate release tablet 15 mg  15 mg Oral Q6H Alethea Angulo MD   Stopped at 06/15/18 7844    phenytoin (DILANTIN) ER capsule 100 mg  100 mg Oral BID Ana Lilia Loera MD   100 mg at 06/15/18 2274    propranolol (INDERAL) tablet 20 mg  20 mg Oral TID Ana Lilia Loera MD   20 mg at 06/15/18 0436    valsartan (DIOVAN) tablet 160 mg  160 mg Oral Daily Ana Lilia Loera MD   160 mg at 06/15/18 4509    sodium chloride flush 0.9 % injection 10 mL  10 mL Intravenous 2 times per day Ana Lilia Loera MD   10 mL at 06/15/18 6277    sodium chloride flush 0.9 % injection 10 mL  10 mL Intravenous PRN Ana Lilia Loera MD        acetaminophen (TYLENOL) tablet 650 mg  650 mg Oral Q4H PRN Ana Lilia Loera MD   650 mg at 06/14/18 1039    ondansetron (ZOFRAN) injection 4 mg  4 mg Intravenous Q6H PRN Ana Lilia Loera MD   4 mg at 06/14/18 1542    pantoprazole (PROTONIX) injection 40 mg  40 mg Intravenous Daily Ana Lilia Loera MD   40 mg at 06/15/18 9063       Problem list:    Patient Active Problem List   Diagnosis    HTN (hypertension), benign    Asthma    History of DVT (deep vein thrombosis)    CKD (chronic kidney disease) stage 2, GFR 60-89 ml/min    Acquired hypothyroidism    History of epilepsy    GI bleed       Assessment:    1. Acute lower GI bleed     2. Essential hypertension     3. Hypothyroidism     4. Seizure disorder     5. Hyperlipidemia     6. Chronic back pain     7. Anxiety and depression    8. Ischemic colitis    9. Gastritis    10. Diverticulosis    Plan:    1. Continue intravenous fluids    2. Advance diet as tolerated    3. Stop lisinopril as low blood pressure may contribute to ischemic colitis    4.  Home when okay with surgery      Claude Alexandra D.O., FACOI  1:30 PM  6/15/2018

## 2018-06-15 NOTE — PROGRESS NOTES
Surgical resident messaged via [address] [place] [address] to discharge from surgical standpoint, Dr. Danielle Burnham called with update, he will wait to hear from them for discharge planning.   Electronically signed by Trudy Ulrich RN on 6/15/2018 at 2:08 PM

## 2018-06-15 NOTE — OP NOTE
Linus Casey  YOB: 1940  [id number]    Pre-operative Diagnosis:  Rectal bleeding    Post-operative Diagnosis: mild gastritis, ischemic colitis, diverticulosis    Procedure: EGD , colonoscopy     Anesthesia: [facility]    Surgeon: Boy Chávez MD    Assistant: None    Complications: none    Specimens: none    Procedure:  Pt was taken to the endoscopy suite and placed on the endoscopy table in a left lateral decubitus position. LMAC anesthesia was administered and a bite block was inserted. A lubricated gastroscope was inserted into the oropharynx and advanced into the esophagus. The esophagus was inspected throughout its length. There were no varices. No evidence of esophagitis. The GE junction was sharp and located at 38 cm. The stomach was entered and insufflated. The antrum was mildly inflamed. Biopsies were taken for H Pylori. The pylorus was intubated. The first and second portions of the duodenum were normal.  The scope was pulled back into the antrum and retroflexed. The angle of the stomach was normal.  The proximal greater and lesser curves were normal.  The cardia was normal.  At the GE junction, there was no evidence of a hiatal hernia. The stomach was deflated and the scope was withdrawn and removed. Next, a digital rectal examination revealed no gross blood, normal tone, no mass was palpated. A lubricated colonoscope was inserted and advanced to the cecum without difficulty. The ileocecal valve and appendiceal orifice were identified and photographed. Prep was good. Cecum, ascending colon, hepatic flexure, transverse colon, splenic flexure, descending colon, sigmoid colon were all extensively inspected. There was patchy inflammatory changes at the splenic flexure. Mild diverticular disease was seen in the sigmoid. The proximal rectum was normal. The distal rectum was normal. The colon was deflated. The scope was removed.  The patient tolerated the procedure

## 2018-06-19 LAB
EKG ATRIAL RATE: 86 BPM
EKG P AXIS: 27 DEGREES
EKG P-R INTERVAL: 178 MS
EKG Q-T INTERVAL: 380 MS
EKG QRS DURATION: 92 MS
EKG QTC CALCULATION (BAZETT): 454 MS
EKG R AXIS: 16 DEGREES
EKG T AXIS: 36 DEGREES
EKG VENTRICULAR RATE: 86 BPM

## 2018-11-12 ENCOUNTER — OFFICE VISIT (OUTPATIENT)
Dept: NEUROLOGY | Age: 78
End: 2018-11-12
Payer: MEDICARE

## 2018-11-12 VITALS
WEIGHT: 179 LBS | BODY MASS INDEX: 30.56 KG/M2 | OXYGEN SATURATION: 94 % | RESPIRATION RATE: 16 BRPM | DIASTOLIC BLOOD PRESSURE: 68 MMHG | HEART RATE: 77 BPM | SYSTOLIC BLOOD PRESSURE: 109 MMHG | HEIGHT: 64 IN

## 2018-11-12 DIAGNOSIS — G40.109 PARTIAL SYMPTOMATIC EPILEPSY WITH SIMPLE PARTIAL SEIZURES, NOT INTRACTABLE, WITHOUT STATUS EPILEPTICUS (HCC): Primary | ICD-10-CM

## 2018-11-12 PROCEDURE — G8427 DOCREV CUR MEDS BY ELIG CLIN: HCPCS | Performed by: CLINICAL NURSE SPECIALIST

## 2018-11-12 PROCEDURE — 1101F PT FALLS ASSESS-DOCD LE1/YR: CPT | Performed by: CLINICAL NURSE SPECIALIST

## 2018-11-12 PROCEDURE — 1123F ACP DISCUSS/DSCN MKR DOCD: CPT | Performed by: CLINICAL NURSE SPECIALIST

## 2018-11-12 PROCEDURE — 99214 OFFICE O/P EST MOD 30 MIN: CPT | Performed by: CLINICAL NURSE SPECIALIST

## 2018-11-12 PROCEDURE — G8417 CALC BMI ABV UP PARAM F/U: HCPCS | Performed by: CLINICAL NURSE SPECIALIST

## 2018-11-12 PROCEDURE — G8400 PT W/DXA NO RESULTS DOC: HCPCS | Performed by: CLINICAL NURSE SPECIALIST

## 2018-11-12 PROCEDURE — G8484 FLU IMMUNIZE NO ADMIN: HCPCS | Performed by: CLINICAL NURSE SPECIALIST

## 2018-11-12 PROCEDURE — 4040F PNEUMOC VAC/ADMIN/RCVD: CPT | Performed by: CLINICAL NURSE SPECIALIST

## 2018-11-12 PROCEDURE — 1090F PRES/ABSN URINE INCON ASSESS: CPT | Performed by: CLINICAL NURSE SPECIALIST

## 2018-11-12 PROCEDURE — 1036F TOBACCO NON-USER: CPT | Performed by: CLINICAL NURSE SPECIALIST

## 2018-11-12 RX ORDER — TOPIRAMATE 25 MG/1
TABLET ORAL
COMMUNITY
Start: 2018-09-26 | End: 2022-05-16 | Stop reason: ALTCHOICE

## 2019-04-04 ENCOUNTER — HOSPITAL ENCOUNTER (OUTPATIENT)
Age: 79
Discharge: HOME | End: 2019-04-04
Payer: MEDICARE

## 2019-04-04 PROCEDURE — 87081 CULTURE SCREEN ONLY: CPT

## 2019-04-06 LAB — MRSA CULTURE ONLY: NORMAL

## 2019-07-01 ENCOUNTER — APPOINTMENT (OUTPATIENT)
Dept: CT IMAGING | Age: 79
DRG: 193 | End: 2019-07-01
Payer: MEDICARE

## 2019-07-01 ENCOUNTER — HOSPITAL ENCOUNTER (INPATIENT)
Age: 79
LOS: 2 days | Discharge: HOME | DRG: 193 | End: 2019-07-03
Attending: EMERGENCY MEDICINE | Admitting: INTERNAL MEDICINE
Payer: MEDICARE

## 2019-07-01 DIAGNOSIS — R41.82 ALTERED MENTAL STATUS, UNSPECIFIED ALTERED MENTAL STATUS TYPE: Primary | ICD-10-CM

## 2019-07-01 DIAGNOSIS — N30.00 ACUTE CYSTITIS WITHOUT HEMATURIA: ICD-10-CM

## 2019-07-01 DIAGNOSIS — R79.89 INCREASED AMMONIA LEVEL: ICD-10-CM

## 2019-07-01 DIAGNOSIS — N17.9 AKI (ACUTE KIDNEY INJURY) (HCC): ICD-10-CM

## 2019-07-01 DIAGNOSIS — R09.02 HYPOXIA: ICD-10-CM

## 2019-07-01 LAB
ALBUMIN SERPL-MCNC: 3.5 G/DL (ref 3.5–5.2)
ALP BLD-CCNC: 166 U/L (ref 35–104)
ALT SERPL-CCNC: 25 U/L (ref 0–32)
AMMONIA: 74.6 UMOL/L (ref 11–51)
ANION GAP SERPL CALCULATED.3IONS-SCNC: 11 MMOL/L (ref 7–16)
AST SERPL-CCNC: 24 U/L (ref 0–31)
B.E.: -6.9 MMOL/L (ref -3–3)
BACTERIA: ABNORMAL /HPF
BASOPHILS ABSOLUTE: 0.07 E9/L (ref 0–0.2)
BASOPHILS RELATIVE PERCENT: 0.6 % (ref 0–2)
BILIRUB SERPL-MCNC: 0.2 MG/DL (ref 0–1.2)
BILIRUBIN URINE: NEGATIVE
BLOOD, URINE: NEGATIVE
BUN BLDV-MCNC: 32 MG/DL (ref 8–23)
CALCIUM SERPL-MCNC: 8.9 MG/DL (ref 8.6–10.2)
CASTS 2: ABNORMAL /LPF
CHLORIDE BLD-SCNC: 105 MMOL/L (ref 98–107)
CLARITY: CLEAR
CO2: 21 MMOL/L (ref 22–29)
COHB: 0.7 % (ref 0–1.5)
COLOR: YELLOW
CREAT SERPL-MCNC: 1.4 MG/DL (ref 0.5–1)
CRITICAL: ABNORMAL
DATE ANALYZED: ABNORMAL
DATE OF COLLECTION: ABNORMAL
EKG ATRIAL RATE: 63 BPM
EKG P AXIS: 22 DEGREES
EKG P-R INTERVAL: 182 MS
EKG Q-T INTERVAL: 422 MS
EKG QRS DURATION: 88 MS
EKG QTC CALCULATION (BAZETT): 431 MS
EKG R AXIS: 18 DEGREES
EKG T AXIS: 27 DEGREES
EKG VENTRICULAR RATE: 63 BPM
EOSINOPHILS ABSOLUTE: 0.8 E9/L (ref 0.05–0.5)
EOSINOPHILS RELATIVE PERCENT: 7.4 % (ref 0–6)
EPITHELIAL CELLS, UA: ABNORMAL /HPF
GFR AFRICAN AMERICAN: 44
GFR NON-AFRICAN AMERICAN: 36 ML/MIN/1.73
GLUCOSE BLD-MCNC: 117 MG/DL (ref 74–99)
GLUCOSE URINE: NEGATIVE MG/DL
HCO3: 20.4 MMOL/L (ref 22–26)
HCT VFR BLD CALC: 38.6 % (ref 34–48)
HEMOGLOBIN: 12.2 G/DL (ref 11.5–15.5)
HHB: 6.6 % (ref 0–5)
IMMATURE GRANULOCYTES #: 0.12 E9/L
IMMATURE GRANULOCYTES %: 1.1 % (ref 0–5)
KETONES, URINE: NEGATIVE MG/DL
LAB: ABNORMAL
LACTIC ACID: 1 MMOL/L (ref 0.5–2.2)
LEUKOCYTE ESTERASE, URINE: ABNORMAL
LIPASE: 20 U/L (ref 13–60)
LYMPHOCYTES ABSOLUTE: 3.65 E9/L (ref 1.5–4)
LYMPHOCYTES RELATIVE PERCENT: 33.7 % (ref 20–42)
Lab: ABNORMAL
MCH RBC QN AUTO: 30.2 PG (ref 26–35)
MCHC RBC AUTO-ENTMCNC: 31.6 % (ref 32–34.5)
MCV RBC AUTO: 95.5 FL (ref 80–99.9)
METHB: 0.2 % (ref 0–1.5)
MODE: ABNORMAL
MONOCYTES ABSOLUTE: 1.02 E9/L (ref 0.1–0.95)
MONOCYTES RELATIVE PERCENT: 9.4 % (ref 2–12)
NEUTROPHILS ABSOLUTE: 5.16 E9/L (ref 1.8–7.3)
NEUTROPHILS RELATIVE PERCENT: 47.8 % (ref 43–80)
NITRITE, URINE: NEGATIVE
O2 CONTENT: 15.7 ML/DL
O2 SATURATION: 93.3 % (ref 92–98.5)
O2HB: 92.5 % (ref 94–97)
OPERATOR ID: ABNORMAL
PATIENT TEMP: 37 C
PCO2: 48.6 MMHG (ref 35–45)
PDW BLD-RTO: 13.7 FL (ref 11.5–15)
PH BLOOD GAS: 7.24 (ref 7.35–7.45)
PH UA: 5.5 (ref 5–9)
PLATELET # BLD: 258 E9/L (ref 130–450)
PMV BLD AUTO: 9.7 FL (ref 7–12)
PO2: 72.4 MMHG (ref 60–100)
POTASSIUM SERPL-SCNC: 4.8 MMOL/L (ref 3.5–5)
PRO-BNP: 374 PG/ML (ref 0–450)
PROTEIN UA: NEGATIVE MG/DL
RBC # BLD: 4.04 E12/L (ref 3.5–5.5)
RBC UA: ABNORMAL /HPF (ref 0–2)
SODIUM BLD-SCNC: 137 MMOL/L (ref 132–146)
SOURCE, BLOOD GAS: ABNORMAL
SPECIFIC GRAVITY UA: 1.02 (ref 1–1.03)
THB: 12 G/DL (ref 11.5–16.5)
TIME ANALYZED: 1823
TOTAL PROTEIN: 6.7 G/DL (ref 6.4–8.3)
TROPONIN: <0.01 NG/ML (ref 0–0.03)
UROBILINOGEN, URINE: 0.2 E.U./DL
WBC # BLD: 10.8 E9/L (ref 4.5–11.5)
WBC UA: ABNORMAL /HPF (ref 0–5)

## 2019-07-01 PROCEDURE — 2060000000 HC ICU INTERMEDIATE R&B

## 2019-07-01 PROCEDURE — 99285 EMERGENCY DEPT VISIT HI MDM: CPT

## 2019-07-01 PROCEDURE — 87633 RESP VIRUS 12-25 TARGETS: CPT

## 2019-07-01 PROCEDURE — 82140 ASSAY OF AMMONIA: CPT

## 2019-07-01 PROCEDURE — 87581 M.PNEUMON DNA AMP PROBE: CPT

## 2019-07-01 PROCEDURE — 93010 ELECTROCARDIOGRAM REPORT: CPT | Performed by: INTERNAL MEDICINE

## 2019-07-01 PROCEDURE — 81001 URINALYSIS AUTO W/SCOPE: CPT

## 2019-07-01 PROCEDURE — 2580000003 HC RX 258: Performed by: PHYSICIAN ASSISTANT

## 2019-07-01 PROCEDURE — 93005 ELECTROCARDIOGRAM TRACING: CPT | Performed by: PHYSICIAN ASSISTANT

## 2019-07-01 PROCEDURE — 96365 THER/PROPH/DIAG IV INF INIT: CPT

## 2019-07-01 PROCEDURE — 96361 HYDRATE IV INFUSION ADD-ON: CPT

## 2019-07-01 PROCEDURE — 87486 CHLMYD PNEUM DNA AMP PROBE: CPT

## 2019-07-01 PROCEDURE — 6370000000 HC RX 637 (ALT 250 FOR IP): Performed by: INTERNAL MEDICINE

## 2019-07-01 PROCEDURE — 85025 COMPLETE CBC W/AUTO DIFF WBC: CPT

## 2019-07-01 PROCEDURE — 6370000000 HC RX 637 (ALT 250 FOR IP): Performed by: PHYSICIAN ASSISTANT

## 2019-07-01 PROCEDURE — 80053 COMPREHEN METABOLIC PANEL: CPT

## 2019-07-01 PROCEDURE — 74176 CT ABD & PELVIS W/O CONTRAST: CPT

## 2019-07-01 PROCEDURE — G0378 HOSPITAL OBSERVATION PER HR: HCPCS

## 2019-07-01 PROCEDURE — 87040 BLOOD CULTURE FOR BACTERIA: CPT

## 2019-07-01 PROCEDURE — 71250 CT THORAX DX C-: CPT

## 2019-07-01 PROCEDURE — 84484 ASSAY OF TROPONIN QUANT: CPT

## 2019-07-01 PROCEDURE — 87798 DETECT AGENT NOS DNA AMP: CPT

## 2019-07-01 PROCEDURE — 87088 URINE BACTERIA CULTURE: CPT

## 2019-07-01 PROCEDURE — 82805 BLOOD GASES W/O2 SATURATION: CPT

## 2019-07-01 PROCEDURE — 96374 THER/PROPH/DIAG INJ IV PUSH: CPT

## 2019-07-01 PROCEDURE — 83690 ASSAY OF LIPASE: CPT

## 2019-07-01 PROCEDURE — 6360000002 HC RX W HCPCS: Performed by: PHYSICIAN ASSISTANT

## 2019-07-01 PROCEDURE — 83605 ASSAY OF LACTIC ACID: CPT

## 2019-07-01 PROCEDURE — 83880 ASSAY OF NATRIURETIC PEPTIDE: CPT

## 2019-07-01 RX ORDER — OXYCODONE HYDROCHLORIDE AND ACETAMINOPHEN 5; 325 MG/1; MG/1
2 TABLET ORAL ONCE
Status: COMPLETED | OUTPATIENT
Start: 2019-07-01 | End: 2019-07-01

## 2019-07-01 RX ORDER — OXYCODONE HYDROCHLORIDE 15 MG/1
15 TABLET ORAL EVERY 6 HOURS PRN
Status: DISCONTINUED | OUTPATIENT
Start: 2019-07-01 | End: 2019-07-03 | Stop reason: HOSPADM

## 2019-07-01 RX ORDER — ASPIRIN 81 MG/1
81 TABLET ORAL 2 TIMES DAILY
Status: DISCONTINUED | OUTPATIENT
Start: 2019-07-01 | End: 2019-07-03 | Stop reason: HOSPADM

## 2019-07-01 RX ORDER — ATORVASTATIN CALCIUM 10 MG/1
10 TABLET, FILM COATED ORAL NIGHTLY
Status: DISCONTINUED | OUTPATIENT
Start: 2019-07-01 | End: 2019-07-03 | Stop reason: HOSPADM

## 2019-07-01 RX ORDER — LACTULOSE 10 G/15ML
20 SOLUTION ORAL ONCE
Status: COMPLETED | OUTPATIENT
Start: 2019-07-01 | End: 2019-07-01

## 2019-07-01 RX ORDER — POTASSIUM CHLORIDE 20 MEQ/1
20 TABLET, EXTENDED RELEASE ORAL 2 TIMES DAILY
COMMUNITY

## 2019-07-01 RX ORDER — 0.9 % SODIUM CHLORIDE 0.9 %
1000 INTRAVENOUS SOLUTION INTRAVENOUS ONCE
Status: COMPLETED | OUTPATIENT
Start: 2019-07-01 | End: 2019-07-01

## 2019-07-01 RX ORDER — LACTULOSE 10 G/15ML
20 SOLUTION ORAL DAILY
Status: DISCONTINUED | OUTPATIENT
Start: 2019-07-02 | End: 2019-07-03 | Stop reason: HOSPADM

## 2019-07-01 RX ORDER — LEVOTHYROXINE SODIUM 0.03 MG/1
25 TABLET ORAL DAILY
Status: DISCONTINUED | OUTPATIENT
Start: 2019-07-02 | End: 2019-07-03 | Stop reason: HOSPADM

## 2019-07-01 RX ORDER — TOPIRAMATE 25 MG/1
25 TABLET ORAL DAILY
Status: DISCONTINUED | OUTPATIENT
Start: 2019-07-02 | End: 2019-07-03 | Stop reason: HOSPADM

## 2019-07-01 RX ORDER — PROPRANOLOL HYDROCHLORIDE 20 MG/1
20 TABLET ORAL 3 TIMES DAILY
Status: DISCONTINUED | OUTPATIENT
Start: 2019-07-01 | End: 2019-07-03 | Stop reason: HOSPADM

## 2019-07-01 RX ORDER — LOSARTAN POTASSIUM 25 MG/1
25 TABLET ORAL DAILY
COMMUNITY
End: 2022-04-25

## 2019-07-01 RX ORDER — DIAZEPAM 5 MG/1
5 TABLET ORAL EVERY 12 HOURS PRN
Status: DISCONTINUED | OUTPATIENT
Start: 2019-07-01 | End: 2019-07-03 | Stop reason: HOSPADM

## 2019-07-01 RX ORDER — LOSARTAN POTASSIUM 25 MG/1
25 TABLET ORAL DAILY
Status: DISCONTINUED | OUTPATIENT
Start: 2019-07-02 | End: 2019-07-02

## 2019-07-01 RX ORDER — DOXEPIN HYDROCHLORIDE 10 MG/1
10 CAPSULE ORAL NIGHTLY
Status: DISCONTINUED | OUTPATIENT
Start: 2019-07-01 | End: 2019-07-02

## 2019-07-01 RX ORDER — DOXEPIN HYDROCHLORIDE 10 MG/1
6 CAPSULE ORAL NIGHTLY
COMMUNITY
End: 2022-05-16 | Stop reason: ALTCHOICE

## 2019-07-01 RX ORDER — PHENYTOIN SODIUM 100 MG/1
100 CAPSULE, EXTENDED RELEASE ORAL 2 TIMES DAILY
Status: DISCONTINUED | OUTPATIENT
Start: 2019-07-01 | End: 2019-07-03 | Stop reason: HOSPADM

## 2019-07-01 RX ORDER — OXYCODONE HYDROCHLORIDE 15 MG/1
15 TABLET ORAL EVERY 6 HOURS PRN
COMMUNITY

## 2019-07-01 RX ADMIN — ASPIRIN 81 MG: 81 TABLET ORAL at 23:33

## 2019-07-01 RX ADMIN — PHENYTOIN SODIUM 100 MG: 100 CAPSULE ORAL at 23:33

## 2019-07-01 RX ADMIN — LACTULOSE 20 G: 20 SOLUTION ORAL at 21:14

## 2019-07-01 RX ADMIN — DOXEPIN HYDROCHLORIDE 10 MG: 10 CAPSULE ORAL at 23:46

## 2019-07-01 RX ADMIN — SODIUM CHLORIDE 1000 ML: 9 INJECTION, SOLUTION INTRAVENOUS at 18:05

## 2019-07-01 RX ADMIN — OXYCODONE HYDROCHLORIDE AND ACETAMINOPHEN 2 TABLET: 5; 325 TABLET ORAL at 19:25

## 2019-07-01 RX ADMIN — CEFTRIAXONE 1 G: 1 INJECTION, POWDER, FOR SOLUTION INTRAMUSCULAR; INTRAVENOUS at 20:36

## 2019-07-01 RX ADMIN — DIAZEPAM 5 MG: 5 TABLET ORAL at 23:33

## 2019-07-01 RX ADMIN — PROPRANOLOL HYDROCHLORIDE 20 MG: 20 TABLET ORAL at 23:46

## 2019-07-01 ASSESSMENT — PAIN SCALES - GENERAL
PAINLEVEL_OUTOF10: 6
PAINLEVEL_OUTOF10: 0

## 2019-07-01 NOTE — ED PROVIDER NOTES
ED Attending  CC: Xenia     Department of Emergency Medicine   ED  Provider Note  Admit Date/RoomTime: 7/1/2019  2:40 PM  ED Room: [facility]/[place] [address]   Chief Complaint   Pneumonia (confirmed, on ABX) and Altered Mental Status (confused x3 days)    History of Present Illness   Source of history provided by: Son. History/Exam Limitations: none. Josefa Lowe is a 67 y.o. old female who has a past medical history of:   Past Medical History:   Diagnosis Date    Anxiety and depression 7/13/2016    Arthritis     osteo    Asthma     has chronic couch    Chronic back pain     Chronic kidney disease (CKD), stage II (mild) 04/19/2012    Creatinine around 1.3 to 1.4 in April 2012 secondary to IV antibiotics following back surgery.  Chronic osteomyelitis of lumbar spine ([address]     note on chart from Dr. Tin Monroe dated 8/17/2017    Foot pain     after back surgery / chronic    HTN (hypertension), benign 1/10/2015    Hx of blood clots     2011 / DVT, PE    Hx of migraines     Hyperlipidemia     Hypothyroidism     Hypothyroidism 7/13/2016    Neuropathy of leg     bilateral    Other postoperative infection 08/2011    Was on Vancomycin following the surgery for the removal of lumbar cysts.  Patient on antibiotics for life     Radiculopathy, lumbar region 01/16/2012    Seizures ([address]     last seizure 11/2016 / gran mal    Sleeping difficulties     takes [place]    Spinal stenosis of lumbar region     Synovial cyst of lumbar spine 07/05/2011    Thrombocytopenia ([address] 08/19/2016    follows only with PCP    Tremors of nervous system     hands    Vitamin D deficiency     Wears dentures     upper    presents to the emergency department by private vehicle, with complaints of gradual onset dyspnea, productive cough with sputum described as yellow and wheezing which began 4 week(s) prior to arrival.  The symptoms are associated with altered mental status and denies abdominal pain, calf pain, chest pain, dizziness, fatigue, leg swelling, palpitations, rapid heart beat, slow heart beat or syncope. She has prior history of pneumonia in the past.  Since onset the symptoms have been worsening and moderate in severity. The symptoms are aggravated by nothing and relieved by nothing. Patient was seen at Dr. Leal office within the last 3 to 4 weeks and had a chest x-ray which revealed pneumonia. Patient was treated with Augmentin and Biaxin and still has having no improvement. Her son called the office today and they were instructed to come in for admission and for IV antibiotics. Patient's son is historian and states that his mother is very confused and this is not her normal state. He states that his mother lost her  a few months ago and states that last night she was having a conversation with him. ROS   Pertinent positives and negatives are stated within HPI, all other systems reviewed and are negative. Past Surgical History:   Procedure Laterality Date    BACK SURGERY      multiple    CERVICAL FUSION  ?   SECTION      CHOLECYSTECTOMY      lap    COLONOSCOPY      EYE SURGERY Bilateral     cataract extraction    JOINT REPLACEMENT      left    KNEE ARTHROPLASTY Right 2017    Total right knee arthroplasty    LUMBAR LAMINECTOMY  1985    SC COLONOSCOPY FLX DX W/COLLJ SPEC WHEN PFRMD N/A 6/15/2018    COLONOSCOPY DIAGNOSTIC performed by Hollis Holcomb MD at [facility] EGD TRANSORAL BIOPSY SINGLE/MULTIPLE N/A 6/15/2018    EGD ESOPHAGOGASTRODUODENOSCOPY performed by Hollis Holcomb MD at [address]      Neck    SPINE SURGERY      synovial cyst    SPINE SURGERY  2012    rebuilt talibone    TONSILLECTOMY     Social History:  reports that she has never smoked. She has never used smokeless tobacco. She reports that she does not drink alcohol or use drugs.   Family History: family history includes bpm  Rhythm: Sinus. Interpretation: normal EKG, normal sinus rhythm. Consult(s):   IP CONSULT TO INTERNAL MEDICINE    Procedure(s):   none    Medical Decision Making:    The patient is a 78-year-old female presents emergency department with altered mental status and worsening shortness of breath over the last month. Patient was recently diagnosed with pneumonia by her family doctor was placed on 2 separate antibiotics. Patient went to her family doctor today and was reevaluated and was sent in for further evaluation due to her worsening symptoms. The patient had a full work-up including blood gases, blood work, CT scan and a urinalysis. Patient was noted to have acute kidney injury, acute cystitis, increased ammonia level and hypoxia noted on arterial blood gas. Patient's son was made aware of the plan of management. Dr. Adriel Millard was presented the case, voiced understanding and agreed to the admission. Patient was given 1 g of Rocephin and lactulose due to the acute cystitis/increased ammonia level. The family was made aware of the need for admission, voiced understanding and agreed. Counseling: The emergency provider has spoken with the patient and discussed todays results, in addition to providing specific details for the plan of care and counseling regarding the diagnosis and prognosis. Questions are answered at this time and they are agreeable with the plan. Assessment      1. Altered mental status, unspecified altered mental status type    2. DANAE (acute kidney injury) ([address]    3. Hypoxia    4. Increased ammonia level    5. Acute cystitis without hematuria      Plan   Admit to telemetry  Patient condition is stable    New Medications     New Prescriptions    No medications on file     Electronically signed by Muna Solitario PA-C   DD: 7/1/19  **This report was transcribed using voice recognition software.  Every effort was made to ensure accuracy; however, inadvertent computerized transcription errors may be present.   END OF ED PROVIDER NOTE      Alex Bailon PA-C  07/01/19 2033       Alex Bailon PA-C  07/01/19 2054

## 2019-07-01 NOTE — ED NOTES
Bed:  HALL-08  Expected date:   Expected time:   Means of arrival:   Comments:  Chasity Gale RN  07/01/19 [id number]

## 2019-07-02 PROBLEM — J12.1 RSV (RESPIRATORY SYNCYTIAL VIRUS PNEUMONIA): Status: ACTIVE | Noted: 2019-07-02

## 2019-07-02 PROBLEM — G93.41 ACUTE METABOLIC ENCEPHALOPATHY: Status: ACTIVE | Noted: 2019-07-01

## 2019-07-02 LAB
AMMONIA: 33.4 UMOL/L (ref 11–51)
ANION GAP SERPL CALCULATED.3IONS-SCNC: 10 MMOL/L (ref 7–16)
BUN BLDV-MCNC: 27 MG/DL (ref 8–23)
CALCIUM SERPL-MCNC: 8.7 MG/DL (ref 8.6–10.2)
CHLORIDE BLD-SCNC: 108 MMOL/L (ref 98–107)
CO2: 23 MMOL/L (ref 22–29)
CREAT SERPL-MCNC: 1.1 MG/DL (ref 0.5–1)
FILM ARRAY ADENOVIRUS: ABNORMAL
FILM ARRAY BORDETELLA PERTUSSIS: ABNORMAL
FILM ARRAY CHLAMYDOPHILIA PNEUMONIAE: ABNORMAL
FILM ARRAY CORONAVIRUS 229E: ABNORMAL
FILM ARRAY CORONAVIRUS HKU1: ABNORMAL
FILM ARRAY CORONAVIRUS NL63: ABNORMAL
FILM ARRAY CORONAVIRUS OC43: ABNORMAL
FILM ARRAY INFLUENZA A VIRUS 09H1: ABNORMAL
FILM ARRAY INFLUENZA A VIRUS H1: ABNORMAL
FILM ARRAY INFLUENZA A VIRUS H3: ABNORMAL
FILM ARRAY INFLUENZA A VIRUS: ABNORMAL
FILM ARRAY INFLUENZA B: ABNORMAL
FILM ARRAY METAPNEUMOVIRUS: ABNORMAL
FILM ARRAY MYCOPLASMA PNEUMONIAE: ABNORMAL
FILM ARRAY PARAINFLUENZA VIRUS 1: ABNORMAL
FILM ARRAY PARAINFLUENZA VIRUS 2: ABNORMAL
FILM ARRAY PARAINFLUENZA VIRUS 3: ABNORMAL
FILM ARRAY PARAINFLUENZA VIRUS 4: ABNORMAL
FILM ARRAY RHINOVIRUS/ENTEROVIRUS: ABNORMAL
GFR AFRICAN AMERICAN: 58
GFR NON-AFRICAN AMERICAN: 48 ML/MIN/1.73
GLUCOSE BLD-MCNC: 112 MG/DL (ref 74–99)
HCT VFR BLD CALC: 33.8 % (ref 34–48)
HEMOGLOBIN: 10.7 G/DL (ref 11.5–15.5)
MCH RBC QN AUTO: 30.3 PG (ref 26–35)
MCHC RBC AUTO-ENTMCNC: 31.7 % (ref 32–34.5)
MCV RBC AUTO: 95.8 FL (ref 80–99.9)
ORGANISM: ABNORMAL
PDW BLD-RTO: 13.8 FL (ref 11.5–15)
PLATELET # BLD: 207 E9/L (ref 130–450)
PMV BLD AUTO: 9.6 FL (ref 7–12)
POTASSIUM SERPL-SCNC: 4 MMOL/L (ref 3.5–5)
RBC # BLD: 3.53 E12/L (ref 3.5–5.5)
SODIUM BLD-SCNC: 141 MMOL/L (ref 132–146)
WBC # BLD: 9.7 E9/L (ref 4.5–11.5)

## 2019-07-02 PROCEDURE — 97161 PT EVAL LOW COMPLEX 20 MIN: CPT

## 2019-07-02 PROCEDURE — G0378 HOSPITAL OBSERVATION PER HR: HCPCS

## 2019-07-02 PROCEDURE — 85027 COMPLETE CBC AUTOMATED: CPT

## 2019-07-02 PROCEDURE — 97165 OT EVAL LOW COMPLEX 30 MIN: CPT

## 2019-07-02 PROCEDURE — 6370000000 HC RX 637 (ALT 250 FOR IP): Performed by: INTERNAL MEDICINE

## 2019-07-02 PROCEDURE — 96375 TX/PRO/DX INJ NEW DRUG ADDON: CPT

## 2019-07-02 PROCEDURE — 80048 BASIC METABOLIC PNL TOTAL CA: CPT

## 2019-07-02 PROCEDURE — 36415 COLL VENOUS BLD VENIPUNCTURE: CPT

## 2019-07-02 PROCEDURE — 82140 ASSAY OF AMMONIA: CPT

## 2019-07-02 PROCEDURE — 2060000000 HC ICU INTERMEDIATE R&B

## 2019-07-02 PROCEDURE — 6360000002 HC RX W HCPCS: Performed by: INTERNAL MEDICINE

## 2019-07-02 RX ORDER — METHYLPREDNISOLONE SODIUM SUCCINATE 40 MG/ML
40 INJECTION, POWDER, LYOPHILIZED, FOR SOLUTION INTRAMUSCULAR; INTRAVENOUS DAILY
Status: DISCONTINUED | OUTPATIENT
Start: 2019-07-02 | End: 2019-07-03 | Stop reason: HOSPADM

## 2019-07-02 RX ORDER — QUETIAPINE FUMARATE 25 MG/1
12.5 TABLET, FILM COATED ORAL NIGHTLY
Status: DISCONTINUED | OUTPATIENT
Start: 2019-07-02 | End: 2019-07-03 | Stop reason: HOSPADM

## 2019-07-02 RX ADMIN — PHENYTOIN SODIUM 100 MG: 100 CAPSULE ORAL at 09:35

## 2019-07-02 RX ADMIN — OXYCODONE HYDROCHLORIDE 15 MG: 15 TABLET ORAL at 22:17

## 2019-07-02 RX ADMIN — METHYLPREDNISOLONE SODIUM SUCCINATE 40 MG: 40 INJECTION, POWDER, LYOPHILIZED, FOR SOLUTION INTRAMUSCULAR; INTRAVENOUS at 14:29

## 2019-07-02 RX ADMIN — ASPIRIN 81 MG: 81 TABLET ORAL at 09:35

## 2019-07-02 RX ADMIN — LACTULOSE 20 G: 20 SOLUTION ORAL at 09:35

## 2019-07-02 RX ADMIN — LEVOTHYROXINE SODIUM 25 MCG: 25 TABLET ORAL at 09:35

## 2019-07-02 RX ADMIN — TOPIRAMATE 25 MG: 25 TABLET, FILM COATED ORAL at 09:35

## 2019-07-02 RX ADMIN — DIAZEPAM 5 MG: 5 TABLET ORAL at 22:12

## 2019-07-02 RX ADMIN — ENOXAPARIN SODIUM 40 MG: 40 INJECTION SUBCUTANEOUS at 09:36

## 2019-07-02 RX ADMIN — OXYCODONE HYDROCHLORIDE 15 MG: 15 TABLET ORAL at 04:55

## 2019-07-02 RX ADMIN — PHENYTOIN SODIUM 100 MG: 100 CAPSULE ORAL at 22:10

## 2019-07-02 RX ADMIN — ASPIRIN 81 MG: 81 TABLET ORAL at 22:10

## 2019-07-02 RX ADMIN — PROPRANOLOL HYDROCHLORIDE 20 MG: 20 TABLET ORAL at 22:12

## 2019-07-02 RX ADMIN — QUETIAPINE FUMARATE 12.5 MG: 25 TABLET ORAL at 22:10

## 2019-07-02 RX ADMIN — ATORVASTATIN CALCIUM 10 MG: 10 TABLET, FILM COATED ORAL at 22:10

## 2019-07-02 RX ADMIN — LOSARTAN POTASSIUM 25 MG: 25 TABLET ORAL at 09:35

## 2019-07-02 RX ADMIN — PROPRANOLOL HYDROCHLORIDE 20 MG: 20 TABLET ORAL at 09:35

## 2019-07-02 RX ADMIN — PROPRANOLOL HYDROCHLORIDE 20 MG: 20 TABLET ORAL at 16:27

## 2019-07-02 ASSESSMENT — PAIN DESCRIPTION - DIRECTION: RADIATING_TOWARDS: LEFT LOWER ABDOMEN

## 2019-07-02 ASSESSMENT — PAIN DESCRIPTION - FREQUENCY
FREQUENCY: INTERMITTENT
FREQUENCY: INTERMITTENT

## 2019-07-02 ASSESSMENT — PAIN DESCRIPTION - PROGRESSION
CLINICAL_PROGRESSION: GRADUALLY WORSENING
CLINICAL_PROGRESSION: GRADUALLY WORSENING

## 2019-07-02 ASSESSMENT — PAIN DESCRIPTION - ONSET
ONSET: PROGRESSIVE
ONSET: GRADUAL

## 2019-07-02 ASSESSMENT — PAIN SCALES - GENERAL
PAINLEVEL_OUTOF10: 0
PAINLEVEL_OUTOF10: 8
PAINLEVEL_OUTOF10: 10

## 2019-07-02 ASSESSMENT — PAIN DESCRIPTION - DESCRIPTORS
DESCRIPTORS: SHARP;ACHING;DISCOMFORT
DESCRIPTORS: SHARP;RADIATING;DISCOMFORT

## 2019-07-02 ASSESSMENT — PAIN - FUNCTIONAL ASSESSMENT
PAIN_FUNCTIONAL_ASSESSMENT: PREVENTS OR INTERFERES SOME ACTIVE ACTIVITIES AND ADLS
PAIN_FUNCTIONAL_ASSESSMENT: PREVENTS OR INTERFERES SOME ACTIVE ACTIVITIES AND ADLS

## 2019-07-02 ASSESSMENT — PAIN DESCRIPTION - LOCATION
LOCATION: BACK
LOCATION: BACK

## 2019-07-02 ASSESSMENT — PAIN DESCRIPTION - ORIENTATION
ORIENTATION: RIGHT;LEFT
ORIENTATION: RIGHT;LEFT

## 2019-07-02 ASSESSMENT — PAIN DESCRIPTION - PAIN TYPE
TYPE: ACUTE PAIN
TYPE: ACUTE PAIN

## 2019-07-02 NOTE — H&P
07/01/2019    PCO2 48.6 07/01/2019    PO2 72.4 07/01/2019    HCO3 20.4 07/01/2019    BE -6.9 07/01/2019    O2SAT 93.3 07/01/2019     HgBA1c:    Lab Results   Component Value Date    LABA1C 5.5 05/03/2016     FLP:    Lab Results   Component Value Date    TRIG 165 12/11/2016    HDL 70 12/11/2016    LDLCALC 104 12/11/2016    LABVLDL 33 12/11/2016     TSH:    Lab Results   Component Value Date    TSH 5.320 08/17/2016       CT ABDOMEN PELVIS WO CONTRAST Additional Contrast? None   Final Result   1. No renal calculus or obstructive uropathy. 2. No acute inflammatory changes in the omental mesenteric fat planes,   free intraperitoneal air or ascites. 3. Status post cholecystectomy, no dilatation biliary tree. No   dilatation of the biliary tree pancreatic duct. 4. Fat replacement of the liver parenchyma is an old finding seen on   the previous study of June 2018. CT CHEST WO CONTRAST   Final Result   Infiltrates at both lung bases most compatible with atelectasis,   resolving pneumonia could give this appearance                                  ASSESSMENT:      Active Problems:    Asthma    DANAE (acute kidney injury) ([facility]    CKD (chronic kidney disease) stage 2, GFR 60-89 ml/min    Altered mental status    RSV (respiratory syncytial virus pneumonia)  Resolved Problems:    * No resolved hospital problems.  *      PLAN:    Admit  IV steroids  Nebulizers  Viral panel for RSV  Medications for other co morbidities cont as appropriate w dosage adjustments as necessary  PT/OT  DVT PPx  DC planning        Electronically signed by Tang Spann MD on 7/2/2019 at 1:40 PM

## 2019-07-03 VITALS
RESPIRATION RATE: 18 BRPM | BODY MASS INDEX: 36.4 KG/M2 | HEIGHT: 60 IN | HEART RATE: 76 BPM | DIASTOLIC BLOOD PRESSURE: 73 MMHG | OXYGEN SATURATION: 96 % | SYSTOLIC BLOOD PRESSURE: 115 MMHG | TEMPERATURE: 97.4 F | WEIGHT: 185.4 LBS

## 2019-07-03 PROBLEM — K92.2 GI BLEED: Status: RESOLVED | Noted: 2018-06-13 | Resolved: 2019-07-03

## 2019-07-03 PROBLEM — E66.9 OBESITY (BMI 30-39.9): Chronic | Status: ACTIVE | Noted: 2019-07-03

## 2019-07-03 LAB
AMMONIA: 32.4 UMOL/L (ref 11–51)
ANION GAP SERPL CALCULATED.3IONS-SCNC: 11 MMOL/L (ref 7–16)
BUN BLDV-MCNC: 16 MG/DL (ref 8–23)
CALCIUM SERPL-MCNC: 9.2 MG/DL (ref 8.6–10.2)
CHLORIDE BLD-SCNC: 109 MMOL/L (ref 98–107)
CO2: 22 MMOL/L (ref 22–29)
CREAT SERPL-MCNC: 0.8 MG/DL (ref 0.5–1)
GFR AFRICAN AMERICAN: >60
GFR NON-AFRICAN AMERICAN: >60 ML/MIN/1.73
GLUCOSE BLD-MCNC: 112 MG/DL (ref 74–99)
HCT VFR BLD CALC: 33.6 % (ref 34–48)
HEMOGLOBIN: 11.1 G/DL (ref 11.5–15.5)
MCH RBC QN AUTO: 30.6 PG (ref 26–35)
MCHC RBC AUTO-ENTMCNC: 33 % (ref 32–34.5)
MCV RBC AUTO: 92.6 FL (ref 80–99.9)
ORGANISM: ABNORMAL
PDW BLD-RTO: 13.4 FL (ref 11.5–15)
PLATELET # BLD: 224 E9/L (ref 130–450)
PMV BLD AUTO: 9.6 FL (ref 7–12)
POTASSIUM SERPL-SCNC: 4 MMOL/L (ref 3.5–5)
RBC # BLD: 3.63 E12/L (ref 3.5–5.5)
SODIUM BLD-SCNC: 142 MMOL/L (ref 132–146)
URINE CULTURE, ROUTINE: ABNORMAL
URINE CULTURE, ROUTINE: ABNORMAL
WBC # BLD: 7.4 E9/L (ref 4.5–11.5)

## 2019-07-03 PROCEDURE — G0378 HOSPITAL OBSERVATION PER HR: HCPCS

## 2019-07-03 PROCEDURE — 96375 TX/PRO/DX INJ NEW DRUG ADDON: CPT

## 2019-07-03 PROCEDURE — 85027 COMPLETE CBC AUTOMATED: CPT

## 2019-07-03 PROCEDURE — 97530 THERAPEUTIC ACTIVITIES: CPT

## 2019-07-03 PROCEDURE — 6360000002 HC RX W HCPCS: Performed by: INTERNAL MEDICINE

## 2019-07-03 PROCEDURE — 36415 COLL VENOUS BLD VENIPUNCTURE: CPT

## 2019-07-03 PROCEDURE — 6370000000 HC RX 637 (ALT 250 FOR IP): Performed by: INTERNAL MEDICINE

## 2019-07-03 PROCEDURE — 82140 ASSAY OF AMMONIA: CPT

## 2019-07-03 PROCEDURE — 80048 BASIC METABOLIC PNL TOTAL CA: CPT

## 2019-07-03 RX ORDER — PREDNISONE 10 MG/1
TABLET ORAL
Qty: 20 TABLET | Refills: 0 | Status: SHIPPED | OUTPATIENT
Start: 2019-07-03 | End: 2020-05-19 | Stop reason: ALTCHOICE

## 2019-07-03 RX ADMIN — LEVOTHYROXINE SODIUM 25 MCG: 25 TABLET ORAL at 09:21

## 2019-07-03 RX ADMIN — OXYCODONE HYDROCHLORIDE 15 MG: 15 TABLET ORAL at 10:18

## 2019-07-03 RX ADMIN — ASPIRIN 81 MG: 81 TABLET ORAL at 09:21

## 2019-07-03 RX ADMIN — PROPRANOLOL HYDROCHLORIDE 20 MG: 20 TABLET ORAL at 09:21

## 2019-07-03 RX ADMIN — ENOXAPARIN SODIUM 40 MG: 40 INJECTION SUBCUTANEOUS at 09:21

## 2019-07-03 RX ADMIN — PROPRANOLOL HYDROCHLORIDE 20 MG: 20 TABLET ORAL at 13:33

## 2019-07-03 RX ADMIN — PHENYTOIN SODIUM 100 MG: 100 CAPSULE ORAL at 09:21

## 2019-07-03 RX ADMIN — LACTULOSE 20 G: 20 SOLUTION ORAL at 09:21

## 2019-07-03 RX ADMIN — TOPIRAMATE 25 MG: 25 TABLET, FILM COATED ORAL at 09:21

## 2019-07-03 RX ADMIN — METHYLPREDNISOLONE SODIUM SUCCINATE 40 MG: 40 INJECTION, POWDER, LYOPHILIZED, FOR SOLUTION INTRAMUSCULAR; INTRAVENOUS at 09:21

## 2019-07-03 ASSESSMENT — PAIN SCALES - GENERAL
PAINLEVEL_OUTOF10: 0
PAINLEVEL_OUTOF10: 0
PAINLEVEL_OUTOF10: 8
PAINLEVEL_OUTOF10: 0

## 2019-07-03 ASSESSMENT — PAIN DESCRIPTION - ORIENTATION: ORIENTATION: LOWER

## 2019-07-03 ASSESSMENT — PAIN DESCRIPTION - PROGRESSION: CLINICAL_PROGRESSION: GRADUALLY WORSENING

## 2019-07-03 ASSESSMENT — PAIN DESCRIPTION - DESCRIPTORS: DESCRIPTORS: ACHING;DISCOMFORT

## 2019-07-03 ASSESSMENT — PAIN DESCRIPTION - LOCATION: LOCATION: BACK

## 2019-07-03 ASSESSMENT — PAIN DESCRIPTION - PAIN TYPE: TYPE: CHRONIC PAIN

## 2019-07-03 NOTE — CARE COORDINATION
7/3/2019  Social Work Discharge Planning:  Sw notified Gwendolyn with [facility] discharge. Electronically signed by GUERO Phan on 7/3/2019 at 1:49 PM

## 2019-07-03 NOTE — PROGRESS NOTES
07/24/2011     Magnesium:    Lab Results   Component Value Date    MG 1.7 06/14/2018     Phosphorus:    Lab Results   Component Value Date    PHOS 3.1 08/17/2016     PT/INR:    Lab Results   Component Value Date    PROTIME 12.8 06/15/2018    PROTIME 17.0 07/09/2011    INR 1.1 06/15/2018     PTT:    Lab Results   Component Value Date    APTT 25.6 06/13/2018   [APTT}     Assessment:    Patient Active Problem List   Diagnosis    HTN (hypertension), benign    Asthma    History of DVT (deep vein thrombosis)    [facility]E ([facility]) [facility]    CKD (chronic kidney disease) stage 2, GFR 60-89 ml/min    Acquired hypothyroidism    History of epilepsy    Acute metabolic encephalopathy    RSV (respiratory syncytial virus pneumonia)    Obesity (BMI 30-39. 9)       Plan:    Blood pressure ok, continue current medications  Continue breathing treatments. Stable. Renal function back to baseline. As above. Continue synthroid. Stable. Secondary to pneumonia and RSV infection. Back to baseline mental status. Continue conservative therapy. Continue to encourage weight loss. Pt/Ot evaluations for discharge planning. Ok to discharge.     Lauren Klein    10:59 AM  7/3/2019
observation of tasks, assessment of data, and development of POC/Goals    Zhen Vazquez OTR/L  [id number]

## 2019-07-06 LAB
BLOOD CULTURE, ROUTINE: NORMAL
CULTURE, BLOOD 2: NORMAL

## 2019-08-13 ENCOUNTER — HOSPITAL ENCOUNTER (OUTPATIENT)
Dept: AUDIOLOGY | Age: 79
Discharge: HOME | End: 2019-08-13
Payer: MEDICARE

## 2019-08-13 PROCEDURE — 92567 TYMPANOMETRY: CPT | Performed by: AUDIOLOGIST

## 2019-08-14 ENCOUNTER — OFFICE VISIT (OUTPATIENT)
Dept: NEUROLOGY | Age: 79
End: 2019-08-14
Payer: MEDICARE

## 2019-08-14 VITALS
RESPIRATION RATE: 16 BRPM | BODY MASS INDEX: 30.73 KG/M2 | HEART RATE: 78 BPM | HEIGHT: 64 IN | DIASTOLIC BLOOD PRESSURE: 67 MMHG | SYSTOLIC BLOOD PRESSURE: 124 MMHG | OXYGEN SATURATION: 94 % | WEIGHT: 180 LBS

## 2019-08-14 DIAGNOSIS — G40.109 PARTIAL SYMPTOMATIC EPILEPSY WITH SIMPLE PARTIAL SEIZURES, NOT INTRACTABLE, WITHOUT STATUS EPILEPTICUS (HCC): Primary | ICD-10-CM

## 2019-08-14 PROCEDURE — 99214 OFFICE O/P EST MOD 30 MIN: CPT | Performed by: CLINICAL NURSE SPECIALIST

## 2019-08-14 PROCEDURE — 4040F PNEUMOC VAC/ADMIN/RCVD: CPT | Performed by: CLINICAL NURSE SPECIALIST

## 2019-08-14 PROCEDURE — 1123F ACP DISCUSS/DSCN MKR DOCD: CPT | Performed by: CLINICAL NURSE SPECIALIST

## 2019-08-14 PROCEDURE — 1036F TOBACCO NON-USER: CPT | Performed by: CLINICAL NURSE SPECIALIST

## 2019-08-14 PROCEDURE — G8400 PT W/DXA NO RESULTS DOC: HCPCS | Performed by: CLINICAL NURSE SPECIALIST

## 2019-08-14 PROCEDURE — 1090F PRES/ABSN URINE INCON ASSESS: CPT | Performed by: CLINICAL NURSE SPECIALIST

## 2019-08-14 PROCEDURE — G8417 CALC BMI ABV UP PARAM F/U: HCPCS | Performed by: CLINICAL NURSE SPECIALIST

## 2019-08-14 PROCEDURE — G8427 DOCREV CUR MEDS BY ELIG CLIN: HCPCS | Performed by: CLINICAL NURSE SPECIALIST

## 2019-08-30 ENCOUNTER — HOSPITAL ENCOUNTER (OUTPATIENT)
Dept: AUDIOLOGY | Age: 79
Discharge: HOME | End: 2019-08-30
Payer: MEDICARE

## 2019-08-30 PROCEDURE — 9990000010 HC NO CHARGE VISIT: Performed by: AUDIOLOGIST

## 2020-01-15 ENCOUNTER — OFFICE VISIT (OUTPATIENT)
Dept: NEUROLOGY | Age: 80
End: 2020-01-15
Payer: MEDICARE

## 2020-01-15 VITALS
HEIGHT: 64 IN | BODY MASS INDEX: 30.9 KG/M2 | RESPIRATION RATE: 18 BRPM | SYSTOLIC BLOOD PRESSURE: 122 MMHG | HEART RATE: 78 BPM | WEIGHT: 181 LBS | DIASTOLIC BLOOD PRESSURE: 70 MMHG

## 2020-01-15 PROCEDURE — 99214 OFFICE O/P EST MOD 30 MIN: CPT | Performed by: CLINICAL NURSE SPECIALIST

## 2020-01-15 NOTE — PROGRESS NOTES
General appearance: alert, appears stated age and cooperative  Head: Normocephalic, without obvious abnormality, atraumatic  Neck: no adenopathy, no carotid bruit and limited ROM  Lungs: clear to auscultation bilaterally  Heart: regular rate and rhythm  Extremities: no cyanosis, clubbing or edema  Pulses: 2+ and symmetric  Skin: no rashes or lesions     Mental Status: Alert, oriented, thought content appropriate    Excellent historian     Speech: clear with [place] accent   Language: appropriate     Cranial Nerves:  I: smell    II: visual acuity     II: visual fields Full    II: pupils GREGOR   III,VII: ptosis None   III,IV,VI: extraocular muscles  EOMI without nystagmus    V: mastication Normal   V: facial light touch sensation  Normal   V,VII: corneal reflex  Present   VII: facial muscle function - upper     VII: facial muscle function - lower Normal   VIII: hearing Normal   IX: soft palate elevation  Normal   IX,X: gag reflex Present   XI: trapezius strength  5/5   XI: sternocleidomastoid strength 5/5   XI: neck extension strength  5/5   XII: tongue strength  Normal     Motor:  5/5 throughout  Normal bulk and tone     Minimal tremor with outstretched hands   No cogwheel rigidity  No ataxic tremor     Sensory:  LT and PP normal  Vibration minimally decreased in ankles     Coordination:   FN, FFM and KANDY normal  HS normal    Gait:  Normal     DTR:   No reflexes    No Starr's     No palmar grasps - no suck reflex     Laboratory/Radiology:     CBC with Differential:    Lab Results   Component Value Date    WBC 7.4 07/03/2019    RBC 3.63 07/03/2019    HGB 11.1 07/03/2019    HCT 33.6 07/03/2019     07/03/2019    MCV 92.6 07/03/2019    MCH 30.6 07/03/2019    MCHC 33.0 07/03/2019    RDW 13.4 07/03/2019    SEGSPCT 54 07/24/2011    LYMPHOPCT 33.7 07/01/2019    MONOPCT 9.4 07/01/2019    BASOPCT 0.6 07/01/2019    MONOSABS 1.02 07/01/2019    LYMPHSABS 3.65 07/01/2019    EOSABS 0.80 07/01/2019    BASOSABS 0.07 07/01/2019 CMP:    Lab Results   Component Value Date     2019    K 4.0 2019     2019    CO2 22 2019    BUN 16 2019    CREATININE 0.8 2019    GFRAA >60 2019    LABGLOM >60 2019    GLUCOSE 112 2019    GLUCOSE 105 2011    PROT 6.7 2019    LABALBU 3.5 2019    LABALBU 3.3 2011    CALCIUM 9.2 2019    BILITOT 0.2 2019    ALKPHOS 166 2019    AST 24 2019    ALT 25 2019     EEG - May 2017   Normal     MRI Brain - Dec 2016   No acute abnormality    Minimal  appreciated    Dilantin level May 2017   5.8     I independently reviewed the labs and EEG studies at today's appointment. Assessment:     Simple partial seizures with secondary generalization   No seizures in over two years    Doubt underlying dementia at this time   She is an excellent historian for me    I question her cognitive issues being related to partial seizures/hearing issues and not Alzheimer's      Benign essential tremor   Minimal -- on propranolol     Chronic migraine history   Rare use of triptan medications -- will renew at this time     Patient Active Problem List   Diagnosis    HTN (hypertension), benign    Asthma    History of DVT (deep vein thrombosis)    Dementia    CKD (chronic kidney disease) stage 2, GFR 60-89 ml/min    History of epilepsy     Plan: Will continue Dilantin for now   Tolerating well   No seizure recurrence    Dilantin level to be obtained     If recurrent seizure, will switch to different AED    RTO 4 months but will call in the interim with difficulties     I spent 25 minutes with the patient, with 50% or more counseling them on their diagnosis and treatment options.      Trevor Aid  2:32 PM  1/15/2020

## 2020-03-04 ENCOUNTER — TELEPHONE (OUTPATIENT)
Dept: NEUROLOGY | Age: 80
End: 2020-03-04

## 2020-03-04 NOTE — TELEPHONE ENCOUNTER
LM to call set up April/May follow up in [place]  Electronically signed by Aurora Perez on 3/4/20 at 1:47 PM

## 2020-05-19 ENCOUNTER — VIRTUAL VISIT (OUTPATIENT)
Dept: NEUROLOGY | Age: 80
End: 2020-05-19
Payer: MEDICARE

## 2020-05-19 PROCEDURE — 99443 PR PHYS/QHP TELEPHONE EVALUATION 21-30 MIN: CPT | Performed by: CLINICAL NURSE SPECIALIST

## 2020-05-19 NOTE — PROGRESS NOTES
hours if not the rest of the day. Her last seizures was over three years ago and Dilantin was started. Prior to Dilantin, she had tried Keppra (which caused mood changes) and she has tried Trileptal which caused hyponatremia reportedly. She is tolerating Dilantin well (100mg BID) -- no reported seizures    Her dosage was condensed to daily dosing of 200mg    Last appt she was asked to get a level -- she claims this was drawn by PCP but I do not have the results to personally review     EEG was reviewed from May, 2017 - stable without seizure activity     MRI was done in December 2016     Now with worsening tremor   Is taking Inderal 20mg TID for years for this   Also was taking topiramate 25mg for weight loss but this was stopped around the time of her 's passing and her tremors worsened    She is an excellent historian     No chest pain or palpitations  No SOB  No vertigo, lightheadedness or loss of consciousness  No falls, tripping or stumbling  No incontinence of bowels or bladder  No itching or bruising appreciated  No numbness, tingling or focal arm/leg weakness    ROS otherwise negative     Prior to Visit Medications    Medication Sig Taking? Authorizing Provider   fluticasone-salmeterol (ADVAIR DISKUS) 250-50 MCG/DOSE AEPB Inhale 1 puff into the lungs every 12 hours Yes Nicholas Coronado MD   losartan (COZAAR) 25 MG tablet Take 25 mg by mouth daily Yes Historical Provider, MD   potassium chloride (KLOR-CON M) 20 MEQ extended release tablet Take 20 mEq by mouth 2 times daily  Yes Historical Provider, MD   doxepin (SINEQUAN) 10 MG capsule Take 6 mg by mouth nightly Yes Historical Provider, MD   oxyCODONE (OXY-IR) 15 MG immediate release tablet Take 15 mg by mouth every 6 hours as needed for Pain.  Yes Historical Provider, MD   topiramate (TOPAMAX) 25 MG tablet  Yes Historical Provider, MD   diazepam (VALIUM) 10 MG tablet Take 1 tablet by mouth every 12 hours as needed for Anxiety Yes Hilario Lopez

## 2022-02-14 ENCOUNTER — OFFICE VISIT (OUTPATIENT)
Dept: NEUROLOGY | Age: 82
End: 2022-02-14
Payer: MEDICARE

## 2022-02-14 VITALS
HEART RATE: 89 BPM | DIASTOLIC BLOOD PRESSURE: 72 MMHG | SYSTOLIC BLOOD PRESSURE: 123 MMHG | HEIGHT: 60 IN | WEIGHT: 168 LBS | RESPIRATION RATE: 17 BRPM | TEMPERATURE: 97.2 F | OXYGEN SATURATION: 94 % | BODY MASS INDEX: 32.98 KG/M2

## 2022-02-14 DIAGNOSIS — G40.109 PARTIAL SYMPTOMATIC EPILEPSY WITH SIMPLE PARTIAL SEIZURES, NOT INTRACTABLE, WITHOUT STATUS EPILEPTICUS (HCC): ICD-10-CM

## 2022-02-14 DIAGNOSIS — R56.9 SEIZURE (HCC): Primary | ICD-10-CM

## 2022-02-14 PROCEDURE — 99213 OFFICE O/P EST LOW 20 MIN: CPT | Performed by: CLINICAL NURSE SPECIALIST

## 2022-02-14 RX ORDER — PHENYTOIN SODIUM 100 MG/1
100 CAPSULE, EXTENDED RELEASE ORAL 2 TIMES DAILY
Qty: 180 CAPSULE | Refills: 3 | Status: SHIPPED | OUTPATIENT
Start: 2022-02-14

## 2022-02-14 RX ORDER — PROPRANOLOL HYDROCHLORIDE 20 MG/1
TABLET ORAL
Qty: 310 TABLET | Refills: 3 | Status: SHIPPED | OUTPATIENT
Start: 2022-02-14

## 2022-02-14 NOTE — PROGRESS NOTES
Italia Murillo is a [de-identified] y.o. born left-handed woman    Patient began having seizures when she was 16 and living in [place]. She reports GTC seizures at that time and does not recall being on any medication. She reports they \"went away\" from her early 25s until her 63s. Since then she has had a few GTC seizures. Patient reports having a feeling of doom and developing a \"funny\" sensation in her head. She then falls to the ground her son reports and shakes from 3 minutes to 8 minutes. Afterwards she is confused for hours if not the rest of the day. Her last seizures was over three years ago and Dilantin was started. Prior to Dilantin, she had tried Keppra (which caused mood changes) and she has tried Trileptal which caused hyponatremia reportedly. She is tolerating Dilantin well (100mg BID) -- no reported seizures    Her dosage was condensed to daily dosing of 200mg     EEG was reviewed from May, 2017 - stable without seizure activity     MRI was done in December 2016     Now with worsening tremor   Is taking Inderal 20mg the morning and evening and 30 mg in the afternoon   She has any dizziness or lightheadedness    She is an excellent historian     No chest pain or palpitations  No SOB  No vertigo, lightheadedness or loss of consciousness  No falls, tripping or stumbling  No incontinence of bowels or bladder  No itching or bruising appreciated  No numbness, tingling or focal arm/leg weakness    ROS otherwise negative     Prior to Visit Medications    Medication Sig Taking?  Authorizing Provider   propranolol (INDERAL) 20 MG tablet 1 in am and evening and 1.5 in afternoon Yes GENARO Low   phenytoin (DILANTIN) 100 MG ER capsule Take 1 capsule by mouth 2 times daily Yes GENARO Low CNS   fluticasone-salmeterol (ADVAIR DISKUS) 250-50 MCG/DOSE AEPB Inhale 1 puff into the lungs every 12 hours Yes Nanette Bueno MD   losartan (COZAAR) 25 MG tablet Take 25 mg by mouth daily Yes Historical Provider, MD   potassium chloride (KLOR-CON M) 20 MEQ extended release tablet Take 20 mEq by mouth 2 times daily  Yes Historical Provider, MD   doxepin (SINEQUAN) 10 MG capsule Take 6 mg by mouth nightly Yes Historical Provider, MD   oxyCODONE (OXY-IR) 15 MG immediate release tablet Take 15 mg by mouth every 6 hours as needed for Pain.  Yes Historical Provider, MD   topiramate (TOPAMAX) 25 MG tablet  Yes Historical Provider, MD   diazepam (VALIUM) 10 MG tablet Take 1 tablet by mouth every 12 hours as needed for Anxiety Yes Michelle Bowers MD   hydrochlorothiazide (HYDRODIURIL) 25 MG tablet Take 25 mg by mouth daily  Yes Historical Provider, MD   aspirin 81 MG EC tablet Take 1 tablet by mouth 2 times daily Yes Caleb Waggoner MD   atorvastatin (LIPITOR) 10 MG tablet Take 1 tablet by mouth nightly Yes Caleb Waggoner MD   levothyroxine (SYNTHROID) 25 MCG tablet Take 25 mcg by mouth daily Yes Historical Provider, MD     Allergies as of 02/14/2022 - Review Complete 05/19/2020   Allergen Reaction Noted    Albuterol Hives 01/22/2014    Iodine Shortness Of Breath 01/22/2014    Vistaril [hydroxyzine hcl] Shortness Of Breath 01/22/2014    Gabapentin Other (See Comments) 01/02/2015    Lyrica [pregabalin]  07/13/2016    Skelaxin [metaxalone] Other (See Comments) 01/22/2014     Objective:   /72 (Site: Right Upper Arm, Position: Sitting, Cuff Size: Medium Adult)   Pulse 89   Temp 97.2 °F (36.2 °C)   Resp 17   Ht 5' (1.524 m)   Wt 168 lb (76.2 kg)   SpO2 94%   BMI 32.81 kg/m²      General appearance: alert, appears stated age and cooperative  Head: Normocephalic, without obvious abnormality, atraumatic  Extremities: no cyanosis, clubbing or edema  Pulses: 2+ and symmetric  Skin: no rashes or lesions     Mental Status: Alert, oriented, thought content appropriate    Excellent historian     Speech: clear with [place] accent   Language: appropriate     Cranial Nerves:  I: smell    II: visual acuity II: visual fields Full    II: pupils GREGOR   III,VII: ptosis None   III,IV,VI: extraocular muscles  EOMI without nystagmus    V: mastication Normal   V: facial light touch sensation  Normal   V,VII: corneal reflex  Present   VII: facial muscle function - upper     VII: facial muscle function - lower Normal   VIII: hearing Normal   IX: soft palate elevation  Normal   IX,X: gag reflex Present   XI: trapezius strength  5/5   XI: sternocleidomastoid strength 5/5   XI: neck extension strength  5/5   XII: tongue strength  Normal     Motor:  5/5 throughout  Normal bulk and tone     Minimal tremor with outstretched hands   No cogwheel rigidity  No ataxic tremor     Sensory:  LT and PP normal  Vibration minimally decreased in ankles     Coordination:   FN, FFM and KANDY normal  HS normal    Gait:  Normal     DTR:   No reflexes    No Starr's     No palmar grasps - no suck reflex     Laboratory/Radiology:     CBC with Differential:    Lab Results   Component Value Date    WBC 7.4 07/03/2019    RBC 3.63 07/03/2019    HGB 11.1 07/03/2019    HCT 33.6 07/03/2019     07/03/2019    MCV 92.6 07/03/2019    MCH 30.6 07/03/2019    MCHC 33.0 07/03/2019    RDW 13.4 07/03/2019    SEGSPCT 54 07/24/2011    LYMPHOPCT 33.7 07/01/2019    MONOPCT 9.4 07/01/2019    BASOPCT 0.6 07/01/2019    MONOSABS 1.02 07/01/2019    LYMPHSABS 3.65 07/01/2019    EOSABS 0.80 07/01/2019    BASOSABS 0.07 07/01/2019     CMP:    Lab Results   Component Value Date     07/03/2019    K 4.0 07/03/2019     07/03/2019    CO2 22 07/03/2019    BUN 16 07/03/2019    CREATININE 0.8 07/03/2019    GFRAA >60 07/03/2019    LABGLOM >60 07/03/2019    GLUCOSE 112 07/03/2019    GLUCOSE 105 07/24/2011    PROT 6.7 07/01/2019    LABALBU 3.5 07/01/2019    LABALBU 3.3 07/24/2011    CALCIUM 9.2 07/03/2019    BILITOT 0.2 07/01/2019    ALKPHOS 166 07/01/2019    AST 24 07/01/2019    ALT 25 07/01/2019     EEG - May 2017   Normal     MRI Brain - Dec 2016   No acute abnormality Minimal  appreciated    Dilantin level May 2017 (drawn in  reportedly as well)    5.8     I independently reviewed the labs and EEG studies at today's appointment. Assessment:     Simple partial seizures with secondary generalization   No seizures in over three years    Doubt underlying dementia at this time   She is an excellent historian for me    I question her cognitive issues being related to partial seizures/hearing issues and not Alzheimer's      Benign essential tremor   Minimal -- on propranolol     Chronic migraine history   Rare use of triptan medications -- will renew at this time     Patient Active Problem List   Diagnosis    HTN (hypertension), benign    Asthma    History of DVT (deep vein thrombosis)    Dementia    CKD (chronic kidney disease) stage 2, GFR 60-89 ml/min    History of epilepsy     Plan:      Will continue Dilantin for now   Tolerating well   No seizure recurrence    If recurrent seizure, will switch to different AED-she is agreeable    We will continue propranolol at current dose 20/30/20    RTO 5-6 months but will call in the interim with difficulties     GENARO Dai - CNS  4:08 PM  2022

## 2022-04-25 ENCOUNTER — HOSPITAL ENCOUNTER (INPATIENT)
Age: 82
LOS: 2 days | Discharge: HOME | DRG: 176 | End: 2022-04-27
Attending: EMERGENCY MEDICINE | Admitting: INTERNAL MEDICINE
Payer: MEDICARE

## 2022-04-25 ENCOUNTER — APPOINTMENT (OUTPATIENT)
Dept: CT IMAGING | Age: 82
DRG: 176 | End: 2022-04-25
Payer: MEDICARE

## 2022-04-25 ENCOUNTER — APPOINTMENT (OUTPATIENT)
Dept: GENERAL RADIOLOGY | Age: 82
DRG: 176 | End: 2022-04-25
Payer: MEDICARE

## 2022-04-25 DIAGNOSIS — I26.99 BILATERAL PULMONARY EMBOLISM (HCC): Primary | ICD-10-CM

## 2022-04-25 LAB
ANION GAP SERPL CALCULATED.3IONS-SCNC: 10 MMOL/L (ref 7–16)
BASOPHILS ABSOLUTE: 0.07 E9/L (ref 0–0.2)
BASOPHILS RELATIVE PERCENT: 0.8 % (ref 0–2)
BUN BLDV-MCNC: 12 MG/DL (ref 6–23)
CALCIUM SERPL-MCNC: 9.4 MG/DL (ref 8.6–10.2)
CHLORIDE BLD-SCNC: 102 MMOL/L (ref 98–107)
CO2: 27 MMOL/L (ref 22–29)
CREAT SERPL-MCNC: 0.8 MG/DL (ref 0.5–1)
D DIMER: 2071 NG/ML DDU
EOSINOPHILS ABSOLUTE: 0.27 E9/L (ref 0.05–0.5)
EOSINOPHILS RELATIVE PERCENT: 3.2 % (ref 0–6)
GFR AFRICAN AMERICAN: >60
GFR NON-AFRICAN AMERICAN: >60 ML/MIN/1.73
GLUCOSE BLD-MCNC: 112 MG/DL (ref 74–99)
HCT VFR BLD CALC: 43.5 % (ref 34–48)
HEMOGLOBIN: 14.7 G/DL (ref 11.5–15.5)
IMMATURE GRANULOCYTES #: 0.02 E9/L
IMMATURE GRANULOCYTES %: 0.2 % (ref 0–5)
LYMPHOCYTES ABSOLUTE: 2.65 E9/L (ref 1.5–4)
LYMPHOCYTES RELATIVE PERCENT: 31.1 % (ref 20–42)
MCH RBC QN AUTO: 30.5 PG (ref 26–35)
MCHC RBC AUTO-ENTMCNC: 33.8 % (ref 32–34.5)
MCV RBC AUTO: 90.2 FL (ref 80–99.9)
MONOCYTES ABSOLUTE: 0.94 E9/L (ref 0.1–0.95)
MONOCYTES RELATIVE PERCENT: 11 % (ref 2–12)
NEUTROPHILS ABSOLUTE: 4.56 E9/L (ref 1.8–7.3)
NEUTROPHILS RELATIVE PERCENT: 53.7 % (ref 43–80)
PDW BLD-RTO: 13.2 FL (ref 11.5–15)
PLATELET # BLD: 220 E9/L (ref 130–450)
PMV BLD AUTO: 9.9 FL (ref 7–12)
POTASSIUM SERPL-SCNC: 3.7 MMOL/L (ref 3.5–5)
PRO-BNP: 226 PG/ML (ref 0–450)
RBC # BLD: 4.82 E12/L (ref 3.5–5.5)
SODIUM BLD-SCNC: 139 MMOL/L (ref 132–146)
TROPONIN, HIGH SENSITIVITY: 8 NG/L (ref 0–9)
WBC # BLD: 8.5 E9/L (ref 4.5–11.5)

## 2022-04-25 PROCEDURE — 6370000000 HC RX 637 (ALT 250 FOR IP): Performed by: EMERGENCY MEDICINE

## 2022-04-25 PROCEDURE — 96374 THER/PROPH/DIAG INJ IV PUSH: CPT

## 2022-04-25 PROCEDURE — 83880 ASSAY OF NATRIURETIC PEPTIDE: CPT

## 2022-04-25 PROCEDURE — 80048 BASIC METABOLIC PNL TOTAL CA: CPT

## 2022-04-25 PROCEDURE — 6370000000 HC RX 637 (ALT 250 FOR IP): Performed by: PHYSICIAN ASSISTANT

## 2022-04-25 PROCEDURE — 84484 ASSAY OF TROPONIN QUANT: CPT

## 2022-04-25 PROCEDURE — 85378 FIBRIN DEGRADE SEMIQUANT: CPT

## 2022-04-25 PROCEDURE — 99285 EMERGENCY DEPT VISIT HI MDM: CPT

## 2022-04-25 PROCEDURE — 6360000004 HC RX CONTRAST MEDICATION: Performed by: RADIOLOGY

## 2022-04-25 PROCEDURE — 71275 CT ANGIOGRAPHY CHEST: CPT

## 2022-04-25 PROCEDURE — 2060000000 HC ICU INTERMEDIATE R&B

## 2022-04-25 PROCEDURE — 2580000003 HC RX 258: Performed by: PHYSICIAN ASSISTANT

## 2022-04-25 PROCEDURE — 6360000002 HC RX W HCPCS: Performed by: STUDENT IN AN ORGANIZED HEALTH CARE EDUCATION/TRAINING PROGRAM

## 2022-04-25 PROCEDURE — 93005 ELECTROCARDIOGRAM TRACING: CPT | Performed by: EMERGENCY MEDICINE

## 2022-04-25 PROCEDURE — 6360000002 HC RX W HCPCS: Performed by: EMERGENCY MEDICINE

## 2022-04-25 PROCEDURE — 85025 COMPLETE CBC W/AUTO DIFF WBC: CPT

## 2022-04-25 PROCEDURE — 71045 X-RAY EXAM CHEST 1 VIEW: CPT

## 2022-04-25 RX ORDER — ASPIRIN 81 MG/1
81 TABLET ORAL 2 TIMES DAILY
Status: DISCONTINUED | OUTPATIENT
Start: 2022-04-25 | End: 2022-04-27 | Stop reason: HOSPADM

## 2022-04-25 RX ORDER — POTASSIUM CHLORIDE 20 MEQ/1
40 TABLET, EXTENDED RELEASE ORAL PRN
Status: DISCONTINUED | OUTPATIENT
Start: 2022-04-25 | End: 2022-04-27 | Stop reason: HOSPADM

## 2022-04-25 RX ORDER — BUDESONIDE 0.5 MG/2ML
0.5 INHALANT ORAL 2 TIMES DAILY
Status: DISCONTINUED | OUTPATIENT
Start: 2022-04-25 | End: 2022-04-26

## 2022-04-25 RX ORDER — SODIUM CHLORIDE 9 MG/ML
INJECTION, SOLUTION INTRAVENOUS PRN
Status: DISCONTINUED | OUTPATIENT
Start: 2022-04-25 | End: 2022-04-27 | Stop reason: HOSPADM

## 2022-04-25 RX ORDER — ARFORMOTEROL TARTRATE 15 UG/2ML
15 SOLUTION RESPIRATORY (INHALATION) 2 TIMES DAILY
Status: DISCONTINUED | OUTPATIENT
Start: 2022-04-25 | End: 2022-04-26

## 2022-04-25 RX ORDER — PHENYTOIN SODIUM 100 MG/1
100 CAPSULE, EXTENDED RELEASE ORAL 2 TIMES DAILY
Status: DISCONTINUED | OUTPATIENT
Start: 2022-04-25 | End: 2022-04-27 | Stop reason: HOSPADM

## 2022-04-25 RX ORDER — PROPRANOLOL HYDROCHLORIDE 10 MG/1
20 TABLET ORAL EVERY EVENING
Status: DISCONTINUED | OUTPATIENT
Start: 2022-04-26 | End: 2022-04-27 | Stop reason: HOSPADM

## 2022-04-25 RX ORDER — HYDROCHLOROTHIAZIDE 25 MG/1
25 TABLET ORAL DAILY
Status: DISCONTINUED | OUTPATIENT
Start: 2022-04-26 | End: 2022-04-27 | Stop reason: HOSPADM

## 2022-04-25 RX ORDER — ENOXAPARIN SODIUM 100 MG/ML
1 INJECTION SUBCUTANEOUS ONCE
Status: COMPLETED | OUTPATIENT
Start: 2022-04-25 | End: 2022-04-25

## 2022-04-25 RX ORDER — PREDNISONE 20 MG/1
50 TABLET ORAL EVERY 6 HOURS
Status: COMPLETED | OUTPATIENT
Start: 2022-04-25 | End: 2022-04-26

## 2022-04-25 RX ORDER — LOSARTAN POTASSIUM 50 MG/1
1 TABLET ORAL DAILY
Status: ON HOLD | COMMUNITY
Start: 2022-04-13 | End: 2022-04-27 | Stop reason: HOSPADM

## 2022-04-25 RX ORDER — DIPHENHYDRAMINE HYDROCHLORIDE 50 MG/ML
50 INJECTION INTRAMUSCULAR; INTRAVENOUS ONCE
Status: COMPLETED | OUTPATIENT
Start: 2022-04-25 | End: 2022-04-25

## 2022-04-25 RX ORDER — ATORVASTATIN CALCIUM 10 MG/1
10 TABLET, FILM COATED ORAL NIGHTLY
Status: DISCONTINUED | OUTPATIENT
Start: 2022-04-25 | End: 2022-04-27 | Stop reason: HOSPADM

## 2022-04-25 RX ORDER — OXYCODONE HYDROCHLORIDE 15 MG/1
15 TABLET ORAL EVERY 6 HOURS PRN
Status: DISCONTINUED | OUTPATIENT
Start: 2022-04-25 | End: 2022-04-27 | Stop reason: HOSPADM

## 2022-04-25 RX ORDER — ENOXAPARIN SODIUM 100 MG/ML
1 INJECTION SUBCUTANEOUS 2 TIMES DAILY
Status: DISCONTINUED | OUTPATIENT
Start: 2022-04-26 | End: 2022-04-26

## 2022-04-25 RX ORDER — SODIUM CHLORIDE 0.9 % (FLUSH) 0.9 %
10 SYRINGE (ML) INJECTION EVERY 12 HOURS SCHEDULED
Status: DISCONTINUED | OUTPATIENT
Start: 2022-04-25 | End: 2022-04-27 | Stop reason: HOSPADM

## 2022-04-25 RX ORDER — POTASSIUM CHLORIDE 7.45 MG/ML
10 INJECTION INTRAVENOUS PRN
Status: DISCONTINUED | OUTPATIENT
Start: 2022-04-25 | End: 2022-04-27 | Stop reason: HOSPADM

## 2022-04-25 RX ORDER — ONDANSETRON 4 MG/1
4 TABLET, ORALLY DISINTEGRATING ORAL EVERY 8 HOURS PRN
Status: DISCONTINUED | OUTPATIENT
Start: 2022-04-25 | End: 2022-04-27 | Stop reason: HOSPADM

## 2022-04-25 RX ORDER — PROPRANOLOL HYDROCHLORIDE 10 MG/1
30 TABLET ORAL
Status: DISCONTINUED | OUTPATIENT
Start: 2022-04-26 | End: 2022-04-27 | Stop reason: HOSPADM

## 2022-04-25 RX ORDER — ONDANSETRON 2 MG/ML
4 INJECTION INTRAMUSCULAR; INTRAVENOUS EVERY 6 HOURS PRN
Status: DISCONTINUED | OUTPATIENT
Start: 2022-04-25 | End: 2022-04-27 | Stop reason: HOSPADM

## 2022-04-25 RX ORDER — DIAZEPAM 5 MG/1
10 TABLET ORAL EVERY 8 HOURS PRN
Status: DISCONTINUED | OUTPATIENT
Start: 2022-04-25 | End: 2022-04-27 | Stop reason: HOSPADM

## 2022-04-25 RX ORDER — OMEPRAZOLE 40 MG/1
1 CAPSULE, DELAYED RELEASE ORAL DAILY
COMMUNITY
Start: 2022-04-13

## 2022-04-25 RX ORDER — FUROSEMIDE 40 MG/1
1 TABLET ORAL DAILY
COMMUNITY
Start: 2022-04-13

## 2022-04-25 RX ORDER — DIPHENHYDRAMINE HCL 25 MG
50 TABLET ORAL ONCE
Status: DISCONTINUED | OUTPATIENT
Start: 2022-04-25 | End: 2022-04-25

## 2022-04-25 RX ORDER — ACETAMINOPHEN 650 MG/1
650 SUPPOSITORY RECTAL EVERY 6 HOURS PRN
Status: DISCONTINUED | OUTPATIENT
Start: 2022-04-25 | End: 2022-04-27 | Stop reason: HOSPADM

## 2022-04-25 RX ORDER — ACETAMINOPHEN 325 MG/1
650 TABLET ORAL EVERY 6 HOURS PRN
Status: DISCONTINUED | OUTPATIENT
Start: 2022-04-25 | End: 2022-04-27 | Stop reason: HOSPADM

## 2022-04-25 RX ORDER — LANOLIN ALCOHOL/MO/W.PET/CERES
1.5 CREAM (GRAM) TOPICAL NIGHTLY PRN
Status: DISCONTINUED | OUTPATIENT
Start: 2022-04-25 | End: 2022-04-27 | Stop reason: HOSPADM

## 2022-04-25 RX ORDER — SODIUM CHLORIDE 0.9 % (FLUSH) 0.9 %
10 SYRINGE (ML) INJECTION PRN
Status: DISCONTINUED | OUTPATIENT
Start: 2022-04-25 | End: 2022-04-27 | Stop reason: HOSPADM

## 2022-04-25 RX ORDER — TOPIRAMATE 25 MG/1
25 TABLET ORAL 2 TIMES DAILY
Status: DISCONTINUED | OUTPATIENT
Start: 2022-04-25 | End: 2022-04-27 | Stop reason: HOSPADM

## 2022-04-25 RX ORDER — LEVOTHYROXINE SODIUM 0.03 MG/1
25 TABLET ORAL DAILY
Status: DISCONTINUED | OUTPATIENT
Start: 2022-04-26 | End: 2022-04-27 | Stop reason: HOSPADM

## 2022-04-25 RX ORDER — LOSARTAN POTASSIUM 25 MG/1
25 TABLET ORAL DAILY
Status: DISCONTINUED | OUTPATIENT
Start: 2022-04-26 | End: 2022-04-27 | Stop reason: HOSPADM

## 2022-04-25 RX ADMIN — IOPAMIDOL 75 ML: 755 INJECTION, SOLUTION INTRAVENOUS at 17:09

## 2022-04-25 RX ADMIN — ATORVASTATIN CALCIUM 10 MG: 10 TABLET, FILM COATED ORAL at 22:35

## 2022-04-25 RX ADMIN — DIPHENHYDRAMINE HYDROCHLORIDE 50 MG: 50 INJECTION, SOLUTION INTRAMUSCULAR; INTRAVENOUS at 16:44

## 2022-04-25 RX ADMIN — PREDNISONE 50 MG: 20 TABLET ORAL at 22:36

## 2022-04-25 RX ADMIN — SODIUM CHLORIDE, PRESERVATIVE FREE 10 ML: 5 INJECTION INTRAVENOUS at 22:34

## 2022-04-25 RX ADMIN — PREDNISONE 50 MG: 20 TABLET ORAL at 16:44

## 2022-04-25 RX ADMIN — ASPIRIN 81 MG: 81 TABLET, COATED ORAL at 22:35

## 2022-04-25 RX ADMIN — PHENYTOIN SODIUM 100 MG: 100 CAPSULE ORAL at 22:35

## 2022-04-25 RX ADMIN — SODIUM CHLORIDE, PRESERVATIVE FREE 10 ML: 5 INJECTION INTRAVENOUS at 23:08

## 2022-04-25 RX ADMIN — ENOXAPARIN SODIUM 80 MG: 100 INJECTION SUBCUTANEOUS at 18:46

## 2022-04-25 ASSESSMENT — ENCOUNTER SYMPTOMS
WHEEZING: 0
VOMITING: 0
EYE PAIN: 0
DIARRHEA: 0
SHORTNESS OF BREATH: 0
NAUSEA: 0
EYE REDNESS: 0
ABDOMINAL PAIN: 0
TROUBLE SWALLOWING: 0
BACK PAIN: 0
ABDOMINAL DISTENTION: 0
SINUS PRESSURE: 0
SORE THROAT: 0
ORTHOPNEA: 0
COUGH: 0
EYE DISCHARGE: 0

## 2022-04-25 NOTE — PROGRESS NOTES
Admission database completed to best of this RN's ability. Care plan and education initiated. Pt from home alone. Denies any DME or [place] [id number] services prior to admission.

## 2022-04-25 NOTE — ED PROVIDER NOTES
81-year-old female presents to the emergency department with chest pressure has been intermittent for the last couple of days. She describes it as a heaviness. She states some intermittent shoulder pain as well. Patient has a history of pulmonary embolism and DVT. She reports that she is currently on no anticoagulation. She states no previous history of cardiac etiology issues or ACS she states no nausea vomiting diarrhea diaphoresis or other concerning symptoms. She states no urinary symptoms leg swelling or other complaints at this time    The history is provided by the patient. Chest Pain  Pain location:  Substernal area  Pain quality comment:  Heaviness  Pain radiates to:  L shoulder  Pain severity:  Mild  Onset quality:  Gradual  Duration:  3 days  Timing:  Intermittent  Progression:  Waxing and waning  Chronicity:  New  Relieved by:  Nothing  Worsened by:  Nothing  Ineffective treatments:  None tried  Associated symptoms: no abdominal pain, no anorexia, no anxiety, no back pain, no claudication, no cough, no dysphagia, no fatigue, no fever, no headache, no nausea, no orthopnea, no palpitations, no shortness of breath, no syncope, no vomiting and no weakness    Risk factors: prior DVT/PE         Review of Systems   Constitutional: Negative for chills, fatigue and fever. HENT: Negative for ear pain, sinus pressure, sore throat and trouble swallowing. Eyes: Negative for pain, discharge and redness. Respiratory: Negative for cough, shortness of breath and wheezing. Cardiovascular: Positive for chest pain. Negative for palpitations, orthopnea, claudication and syncope. Gastrointestinal: Negative for abdominal distention, abdominal pain, anorexia, diarrhea, nausea and vomiting. Genitourinary: Negative for dysuria and frequency. Musculoskeletal: Negative for arthralgias and back pain. Skin: Negative for rash and wound. Neurological: Negative for weakness and headaches.    Hematological: Negative for adenopathy. All other systems reviewed and are negative. Physical Exam  Constitutional:       Appearance: Normal appearance. HENT:      Head: Normocephalic and atraumatic. Nose: Nose normal.   Eyes:      Extraocular Movements: Extraocular movements intact. Pupils: Pupils are equal, round, and reactive to light. Cardiovascular:      Rate and Rhythm: Regular rhythm. Tachycardia present. Pulses: Normal pulses. Heart sounds: Normal heart sounds. Pulmonary:      Effort: Pulmonary effort is normal.      Breath sounds: Normal breath sounds. Abdominal:      General: Abdomen is flat. Bowel sounds are normal. There is no distension. Palpations: Abdomen is soft. Tenderness: There is no abdominal tenderness. There is no guarding. Musculoskeletal:         General: Normal range of motion. Right lower leg: No edema. Left lower leg: No edema. Skin:     General: Skin is warm. Capillary Refill: Capillary refill takes less than 2 seconds. Neurological:      General: No focal deficit present. Mental Status: She is alert and oriented to person, place, and time. Procedures   EKG: This EKG is signed and interpreted by the EP. Time: 14:52  Rate: 115  Rhythm: Sinus  Interpretation: sinus tachycardia  Comparison: changes compared to previous EKG    MDM  Number of Diagnoses or Management Options  Bilateral pulmonary embolism ([address]  Diagnosis management comments: Patient seen and examined. Labs and imaging were initiated. CT scan reveals bilateral pulmonary emboli. They are not showing evidence of saddle or even into the main pulmonary arteries but peripheral blood clots bilaterally. Lovenox was initiated patient was felt safe for a telemetry bed.   Patient admitted to Dr. Purcell [facility] service.            --------------------------------------------- PAST HISTORY ---------------------------------------------  Past Medical History:  has a past medical history of Anxiety and depression, Arthritis, Asthma, Chronic back pain, Chronic kidney disease (CKD), stage II (mild), Chronic osteomyelitis of lumbar spine ([address] Foot pain, HTN (hypertension), benign, Hx of blood clots, Hx of migraines, Hyperlipidemia, Hypothyroidism, Hypothyroidism, Neuropathy of leg, Other postoperative infection, Radiculopathy, lumbar region, Seizures ([address] Sleeping difficulties, Spinal stenosis of lumbar region, Synovial cyst of lumbar spine, Thrombocytopenia ([address] Tremors of nervous system, Vitamin D deficiency, and Wears dentures. Past Surgical History:  has a past surgical history that includes lumbar laminectomy (); cervical fusion (?); joint replacement (); Spine surgery ( & ); Spine surgery (); Spine surgery (); Tonsillectomy; Colonoscopy;  section; Cholecystectomy (); eye surgery (Bilateral, ); back surgery; Spine surgery (); Knee Arthroplasty (Right, 2017); pr egd transoral biopsy single/multiple (N/A, 6/15/2018); and pr colonoscopy flx dx w/collj spec when pfrmd (N/A, 6/15/2018). Social History:  reports that she has never smoked. She has never used smokeless tobacco. She reports that she does not drink alcohol and does not use drugs. Family History: family history includes Cirrhosis in her brother; Heart Attack in her father; Other in her mother. The patients home medications have been reviewed.     Allergies: Albuterol, Iodine, Vistaril [hydroxyzine hcl], Gabapentin, Lyrica [pregabalin], and Skelaxin [metaxalone]    -------------------------------------------------- RESULTS -------------------------------------------------    Lab  Results for orders placed or performed during the hospital encounter of 04/25/22   CBC with Auto Differential   Result Value Ref Range    WBC 8.5 4.5 - 11.5 E9/L    RBC 4.82 3.50 - 5.50 E12/L    Hemoglobin 14.7 11.5 - 15.5 g/dL    Hematocrit 43.5 34.0 - 48.0 %    MCV 90.2 80.0 - 99.9 fL    MCH 30.5 26.0 - 35.0 pg    MCHC 33.8 32.0 - 34.5 %    RDW 13.2 11.5 - 15.0 fL    Platelets 266 521 - 788 E9/L    MPV 9.9 7.0 - 12.0 fL    Neutrophils % 53.7 43.0 - 80.0 %    Immature Granulocytes % 0.2 0.0 - 5.0 %    Lymphocytes % 31.1 20.0 - 42.0 %    Monocytes % 11.0 2.0 - 12.0 %    Eosinophils % 3.2 0.0 - 6.0 %    Basophils % 0.8 0.0 - 2.0 %    Neutrophils Absolute 4.56 1.80 - 7.30 E9/L    Immature Granulocytes # 0.02 E9/L    Lymphocytes Absolute 2.65 1.50 - 4.00 E9/L    Monocytes Absolute 0.94 0.10 - 0.95 E9/L    Eosinophils Absolute 0.27 0.05 - 0.50 E9/L    Basophils Absolute 0.07 0.00 - 0.20 Y0/V   Basic Metabolic Panel   Result Value Ref Range    Sodium 139 132 - 146 mmol/L    Potassium 3.7 3.5 - 5.0 mmol/L    Chloride 102 98 - 107 mmol/L    CO2 27 22 - 29 mmol/L    Anion Gap 10 7 - 16 mmol/L    Glucose 112 (H) 74 - 99 mg/dL    BUN 12 6 - 23 mg/dL    CREATININE 0.8 0.5 - 1.0 mg/dL    GFR Non-African American >60 >=60 mL/min/1.73    GFR African American >60     Calcium 9.4 8.6 - 10.2 mg/dL   Troponin   Result Value Ref Range    Troponin, High Sensitivity 8 0 - 9 ng/L   Brain Natriuretic Peptide   Result Value Ref Range    Pro- 0 - 450 pg/mL   D-Dimer, Quantitative   Result Value Ref Range    D-Dimer, Quant 2071 ng/mL DDU   Basic Metabolic Panel w/ Reflex to MG   Result Value Ref Range    Sodium 138 132 - 146 mmol/L    Potassium reflex Magnesium 3.7 3.5 - 5.0 mmol/L    Chloride 103 98 - 107 mmol/L    CO2 23 22 - 29 mmol/L    Anion Gap 12 7 - 16 mmol/L    Glucose 157 (H) 74 - 99 mg/dL    BUN 14 6 - 23 mg/dL    CREATININE 0.8 0.5 - 1.0 mg/dL    GFR Non-African American >60 >=60 mL/min/1.73    GFR African American >60     Calcium 9.3 8.6 - 10.2 mg/dL   CBC auto differential   Result Value Ref Range    WBC 7.1 4.5 - 11.5 E9/L    RBC 4.72 3.50 - 5.50 E12/L    Hemoglobin 14.2 11.5 - 15.5 g/dL    Hematocrit 42.3 34.0 - 48.0 %    MCV 89.6 80.0 - 99.9 fL    MCH 30.1 26.0 - 35.0 pg    MCHC 33.6 32.0 - 34.5 % RDW 13.2 11.5 - 15.0 fL    Platelets 335 888 - 672 E9/L    MPV 10.4 7.0 - 12.0 fL    Neutrophils % 75.6 43.0 - 80.0 %    Immature Granulocytes % 0.4 0.0 - 5.0 %    Lymphocytes % 20.5 20.0 - 42.0 %    Monocytes % 3.1 2.0 - 12.0 %    Eosinophils % 0.0 0.0 - 6.0 %    Basophils % 0.4 0.0 - 2.0 %    Neutrophils Absolute 5.33 1.80 - 7.30 E9/L    Immature Granulocytes # 0.03 E9/L    Lymphocytes Absolute 1.45 (L) 1.50 - 4.00 E9/L    Monocytes Absolute 0.22 0.10 - 0.95 E9/L    Eosinophils Absolute 0.00 (L) 0.05 - 0.50 E9/L    Basophils Absolute 0.03 0.00 - 0.20 E9/L   Miscellaneous Sendout   Result Value Ref Range    test code THROM     This Test Sent To [facility]    EKG 12 Lead   Result Value Ref Range    Ventricular Rate 115 BPM    Atrial Rate 105 BPM    QRS Duration 78 ms    Q-T Interval 356 ms    QTc Calculation (Bazett) 492 ms    R Axis -3 degrees    T Axis -5 degrees       Radiology  XR CHEST PORTABLE    Result Date: 4/25/2022  EXAMINATION: ONE XRAY VIEW OF THE CHEST 4/25/2022 3:09 pm COMPARISON: 06/13/2018 HISTORY: ORDERING SYSTEM PROVIDED HISTORY: chest pain TECHNOLOGIST PROVIDED HISTORY: Reason for exam:->chest pain FINDINGS: The lungs are without acute focal process. There is no effusion or pneumothorax. The cardiomediastinal silhouette is without acute process. The osseous structures are without acute process. No acute process. CTA PULMONARY W CONTRAST    Addendum Date: 4/25/2022    ADDENDUM: Findings discussed on 04/25/2022 at 6:03 p.m. with Dr. Ellis Eduardo     Result Date: 4/25/2022  EXAMINATION: CTA OF THE CHEST 4/25/2022 5:09 pm TECHNIQUE: CTA of the chest was performed after the administration of intravenous contrast.  Multiplanar reformatted images are provided for review. MIP images are provided for review. Dose modulation, iterative reconstruction, and/or weight based adjustment of the mA/kV was utilized to reduce the radiation dose to as low as reasonably achievable. COMPARISON: None.  HISTORY: ORDERING SYSTEM PROVIDED HISTORY: eval for PE, hx of PE TECHNOLOGIST PROVIDED HISTORY: Reason for exam:->eval for PE, hx of PE Decision Support Exception - unselect if not a suspected or confirmed emergency medical condition->Emergency Medical Condition (MA) FINDINGS: Pulmonary Arteries: Pulmonary arteries are adequately opacified for evaluation. No evidence of intraluminal filling defect to suggest pulmonary embolism. Main pulmonary artery is normal in caliber. Pulmonary emboli involving pulmonary branch vessels to the left upper left lower and right lower lobes. Mediastinum: No evidence of mediastinal lymphadenopathy. The heart and pericardium demonstrate no acute abnormality. There is no acute abnormality of the thoracic aorta. Lungs/pleura: The lungs are without acute process. No focal consolidation or pulmonary edema. No evidence of pleural effusion or pneumothorax. Benign 4 mm calcified nodule left lower lobe. No further workup necessary. Upper Abdomen: Limited images of the upper abdomen are unremarkable. Soft Tissues/Bones: No acute bone or soft tissue abnormality. Degenerative changes thoracic spine. Pulmonary emboli involving pulmonary artery branch vessels of the left upper, left lower and right lower lobes. ------------------------- NURSING NOTES AND VITALS REVIEWED ---------------------------  Date / Time Roomed:  4/25/2022  2:35 PM  ED Bed Assignment:  [id number]    The nursing notes within the ED encounter and vital signs as below have been reviewed.    Patient Vitals for the past 24 hrs:   BP Temp Temp src Pulse Resp SpO2   04/27/22 1425 -- -- -- -- -- 99 %   04/27/22 1415 -- -- -- -- -- 97 %   04/27/22 1235 -- -- -- 98 -- --   04/27/22 1203 (!) 111/55 -- -- -- -- --   04/27/22 0815 100/78 -- -- -- -- --   04/27/22 0800 (!) 95/51 97.8 °F (36.6 °C) Oral 77 18 95 %       Oxygen Saturation Interpretation: Normal      ------------------------------------------ PROGRESS NOTES ------------------------------------------  I have spoken with the patient and discussed todays results, in addition to providing specific details for the plan of care and counseling regarding the diagnosis and prognosis. Their questions are answered at this time and they are agreeable with the plan.      --------------------------------- ADDITIONAL PROVIDER NOTES ---------------------------------  This patient's ED course included: a personal history and physicial examination, re-evaluation prior to disposition, multiple bedside re-evaluations, IV medications, cardiac monitoring, continuous pulse oximetry and complex medical decision making and emergency management    This patient has remained hemodynamically stable during their ED course. Please note that the withdrawal or failure to initiate urgent interventions for this patient would likely result in a life threatening deterioration or permanent disability. Accordingly this patient received 30 minutes of critical care time, excluding separately billable procedures. Clinical Impression  1. Bilateral pulmonary embolism ([address]          Disposition  Patient's disposition: Admit to telemetry  Patient's condition is fair.          Joe Campos DO  04/28/22 [id number]

## 2022-04-26 LAB
ANION GAP SERPL CALCULATED.3IONS-SCNC: 12 MMOL/L (ref 7–16)
BASOPHILS ABSOLUTE: 0.03 E9/L (ref 0–0.2)
BASOPHILS RELATIVE PERCENT: 0.4 % (ref 0–2)
BUN BLDV-MCNC: 14 MG/DL (ref 6–23)
CALCIUM SERPL-MCNC: 9.3 MG/DL (ref 8.6–10.2)
CHLORIDE BLD-SCNC: 103 MMOL/L (ref 98–107)
CO2: 23 MMOL/L (ref 22–29)
CREAT SERPL-MCNC: 0.8 MG/DL (ref 0.5–1)
EKG ATRIAL RATE: 105 BPM
EKG Q-T INTERVAL: 356 MS
EKG QRS DURATION: 78 MS
EKG QTC CALCULATION (BAZETT): 492 MS
EKG R AXIS: -3 DEGREES
EKG T AXIS: -5 DEGREES
EKG VENTRICULAR RATE: 115 BPM
EOSINOPHILS ABSOLUTE: 0 E9/L (ref 0.05–0.5)
EOSINOPHILS RELATIVE PERCENT: 0 % (ref 0–6)
GFR AFRICAN AMERICAN: >60
GFR NON-AFRICAN AMERICAN: >60 ML/MIN/1.73
GLUCOSE BLD-MCNC: 157 MG/DL (ref 74–99)
HCT VFR BLD CALC: 42.3 % (ref 34–48)
HEMOGLOBIN: 14.2 G/DL (ref 11.5–15.5)
IMMATURE GRANULOCYTES #: 0.03 E9/L
IMMATURE GRANULOCYTES %: 0.4 % (ref 0–5)
LYMPHOCYTES ABSOLUTE: 1.45 E9/L (ref 1.5–4)
LYMPHOCYTES RELATIVE PERCENT: 20.5 % (ref 20–42)
MCH RBC QN AUTO: 30.1 PG (ref 26–35)
MCHC RBC AUTO-ENTMCNC: 33.6 % (ref 32–34.5)
MCV RBC AUTO: 89.6 FL (ref 80–99.9)
MONOCYTES ABSOLUTE: 0.22 E9/L (ref 0.1–0.95)
MONOCYTES RELATIVE PERCENT: 3.1 % (ref 2–12)
NEUTROPHILS ABSOLUTE: 5.33 E9/L (ref 1.8–7.3)
NEUTROPHILS RELATIVE PERCENT: 75.6 % (ref 43–80)
PDW BLD-RTO: 13.2 FL (ref 11.5–15)
PLATELET # BLD: 210 E9/L (ref 130–450)
PMV BLD AUTO: 10.4 FL (ref 7–12)
POTASSIUM REFLEX MAGNESIUM: 3.7 MMOL/L (ref 3.5–5)
RBC # BLD: 4.72 E12/L (ref 3.5–5.5)
SODIUM BLD-SCNC: 138 MMOL/L (ref 132–146)
WBC # BLD: 7.1 E9/L (ref 4.5–11.5)

## 2022-04-26 PROCEDURE — 94664 DEMO&/EVAL PT USE INHALER: CPT

## 2022-04-26 PROCEDURE — 81291 MTHFR GENE: CPT

## 2022-04-26 PROCEDURE — 36415 COLL VENOUS BLD VENIPUNCTURE: CPT

## 2022-04-26 PROCEDURE — 6360000002 HC RX W HCPCS: Performed by: PHYSICIAN ASSISTANT

## 2022-04-26 PROCEDURE — 81400 MOPATH PROCEDURE LEVEL 1: CPT

## 2022-04-26 PROCEDURE — 80048 BASIC METABOLIC PNL TOTAL CA: CPT

## 2022-04-26 PROCEDURE — 81240 F2 GENE: CPT

## 2022-04-26 PROCEDURE — 93010 ELECTROCARDIOGRAM REPORT: CPT | Performed by: INTERNAL MEDICINE

## 2022-04-26 PROCEDURE — 6370000000 HC RX 637 (ALT 250 FOR IP): Performed by: EMERGENCY MEDICINE

## 2022-04-26 PROCEDURE — 2060000000 HC ICU INTERMEDIATE R&B

## 2022-04-26 PROCEDURE — 94640 AIRWAY INHALATION TREATMENT: CPT

## 2022-04-26 PROCEDURE — 6370000000 HC RX 637 (ALT 250 FOR IP): Performed by: PHYSICIAN ASSISTANT

## 2022-04-26 PROCEDURE — 97161 PT EVAL LOW COMPLEX 20 MIN: CPT

## 2022-04-26 PROCEDURE — 85025 COMPLETE CBC W/AUTO DIFF WBC: CPT

## 2022-04-26 PROCEDURE — 81241 F5 GENE: CPT

## 2022-04-26 PROCEDURE — 2580000003 HC RX 258: Performed by: PHYSICIAN ASSISTANT

## 2022-04-26 PROCEDURE — 6370000000 HC RX 637 (ALT 250 FOR IP): Performed by: INTERNAL MEDICINE

## 2022-04-26 PROCEDURE — 97165 OT EVAL LOW COMPLEX 30 MIN: CPT

## 2022-04-26 RX ORDER — DOXEPIN HYDROCHLORIDE 3 MG/1
6 TABLET ORAL NIGHTLY
Status: DISCONTINUED | OUTPATIENT
Start: 2022-04-26 | End: 2022-04-27 | Stop reason: HOSPADM

## 2022-04-26 RX ADMIN — APIXABAN 10 MG: 5 TABLET, FILM COATED ORAL at 20:50

## 2022-04-26 RX ADMIN — ATORVASTATIN CALCIUM 10 MG: 10 TABLET, FILM COATED ORAL at 20:50

## 2022-04-26 RX ADMIN — PHENYTOIN SODIUM 100 MG: 100 CAPSULE ORAL at 09:23

## 2022-04-26 RX ADMIN — OXYCODONE 15 MG: 15 TABLET ORAL at 18:25

## 2022-04-26 RX ADMIN — ENOXAPARIN SODIUM 80 MG: 100 INJECTION SUBCUTANEOUS at 09:25

## 2022-04-26 RX ADMIN — OXYCODONE 15 MG: 15 TABLET ORAL at 09:22

## 2022-04-26 RX ADMIN — BUDESONIDE 500 MCG: 0.5 SUSPENSION RESPIRATORY (INHALATION) at 09:12

## 2022-04-26 RX ADMIN — LOSARTAN POTASSIUM 25 MG: 25 TABLET, FILM COATED ORAL at 09:24

## 2022-04-26 RX ADMIN — SODIUM CHLORIDE, PRESERVATIVE FREE 10 ML: 5 INJECTION INTRAVENOUS at 20:50

## 2022-04-26 RX ADMIN — PREDNISONE 50 MG: 20 TABLET ORAL at 04:50

## 2022-04-26 RX ADMIN — ASPIRIN 81 MG: 81 TABLET, COATED ORAL at 20:49

## 2022-04-26 RX ADMIN — HYDROCHLOROTHIAZIDE 25 MG: 25 TABLET ORAL at 09:23

## 2022-04-26 RX ADMIN — TOPIRAMATE 25 MG: 25 TABLET, FILM COATED ORAL at 20:49

## 2022-04-26 RX ADMIN — SODIUM CHLORIDE, PRESERVATIVE FREE 10 ML: 5 INJECTION INTRAVENOUS at 12:08

## 2022-04-26 RX ADMIN — SODIUM CHLORIDE, PRESERVATIVE FREE 10 ML: 5 INJECTION INTRAVENOUS at 09:26

## 2022-04-26 RX ADMIN — PHENYTOIN SODIUM 100 MG: 100 CAPSULE ORAL at 20:49

## 2022-04-26 RX ADMIN — ASPIRIN 81 MG: 81 TABLET, COATED ORAL at 09:23

## 2022-04-26 RX ADMIN — PROPRANOLOL HYDROCHLORIDE 30 MG: 10 TABLET ORAL at 11:39

## 2022-04-26 RX ADMIN — OXYCODONE 15 MG: 15 TABLET ORAL at 02:05

## 2022-04-26 RX ADMIN — LEVOTHYROXINE SODIUM 25 MCG: 25 TABLET ORAL at 06:13

## 2022-04-26 RX ADMIN — TOPIRAMATE 25 MG: 25 TABLET, FILM COATED ORAL at 09:23

## 2022-04-26 RX ADMIN — ARFORMOTEROL TARTRATE 15 MCG: 15 SOLUTION RESPIRATORY (INHALATION) at 09:11

## 2022-04-26 ASSESSMENT — PAIN SCALES - GENERAL
PAINLEVEL_OUTOF10: 7
PAINLEVEL_OUTOF10: 4
PAINLEVEL_OUTOF10: 7
PAINLEVEL_OUTOF10: 8
PAINLEVEL_OUTOF10: 5

## 2022-04-26 ASSESSMENT — PAIN DESCRIPTION - LOCATION
LOCATION: HIP
LOCATION: RIB CAGE
LOCATION: HIP

## 2022-04-26 ASSESSMENT — PAIN DESCRIPTION - ORIENTATION
ORIENTATION: RIGHT

## 2022-04-26 ASSESSMENT — PAIN DESCRIPTION - DESCRIPTORS
DESCRIPTORS: ACHING
DESCRIPTORS: ACHING

## 2022-04-26 NOTE — CONSULTS
Yu Fitzpatrick M.D.,[facility]  Ros Banda D.O., F.A.C.O.I., Nicky Mckeon M.D. Coco Carney M.D. Radha Arriaga D.O. Patient:  Mike Geronimo 81 y.o. female MRN: [id number]     Date of Service: 2022      PULMONARY CONSULTATION    Reason for Consultation: Pulmonary emboli  Referring Physician: Dr. Naren Crum with the referring physician will be sent via the electronic medical record. Chief Complaint: Chest pain    CODE STATUS: Full code    SUBJECTIVE:  HPI:  Mike Geronimo is a 81 y.o. female not previously known to our practice. She presented to the ED on  with complaints of chest pressure and heaviness with some intermittent shoulder pain. She has a past medical history of pulmonary embolism and DVT, not currently on anticoagulation. She has no previous cardiac history or acute coronary syndrome. She had no other complaints on presentation. Chest x-ray was negative for acute process, however CTA chest showed pulmonary emboli involving the pulmonary artery branch vessels of the left upper, left lower, and right lower lobes. Her D-dimer was also elevated at 2071. proBNP was negative at 226. Troponin was negative. Today, she was seen and examined lying in bed in no apparent distress. She is currently on room air and says that she still feels the heaviness in her chest.  Her previous PEs were provoked following back surgery. Her mother  of a cerebral hemorrhage and she was wondering if there was a genetic component. We will do thrombophilia testing at this time. She is currently on Lovenox for therapy. We will transition her to Eliquis and she will need to remain on therapy lifelong.        Past Medical History:   Diagnosis Date    Anxiety and depression 2016    Arthritis     osteo    Asthma     has chronic couch    Chronic back pain     Chronic kidney disease (CKD), stage II (mild) 2012    Creatinine around 1.3 to 1.4 in 2012 secondary to IV antibiotics following back surgery.  Chronic osteomyelitis of lumbar spine ([address]     note on chart from Dr. Flores Marley dated 2017    Foot pain     after back surgery / chronic    HTN (hypertension), benign 1/10/2015    Hx of blood clots      / DVT, PE    Hx of migraines     Hyperlipidemia     Hypothyroidism     Hypothyroidism 2016    Neuropathy of leg     bilateral    Other postoperative infection 2011    Was on Vancomycin following the surgery for the removal of lumbar cysts. Patient on antibiotics for life     Radiculopathy, lumbar region 2012    Seizures ([address]     last seizure 2016 / gran mal    Sleeping difficulties     takes [place]    Spinal stenosis of lumbar region     Synovial cyst of lumbar spine 2011    Thrombocytopenia ([address] 2016    follows only with PCP    Tremors of nervous system     hands    Vitamin D deficiency     Wears dentures     upper       Past Surgical History:   Procedure Laterality Date    BACK SURGERY      multiple    CERVICAL FUSION  ?      SECTION      CHOLECYSTECTOMY  's    lap    COLONOSCOPY      EYE SURGERY Bilateral     cataract extraction    JOINT REPLACEMENT  2010    left    KNEE ARTHROPLASTY Right 2017    Total right knee arthroplasty    LUMBAR LAMINECTOMY  1985    VT COLONOSCOPY FLX DX W/COLLJ SPEC WHEN PFRMD N/A 6/15/2018    COLONOSCOPY DIAGNOSTIC performed by London Griggs MD at [facility] EGD TRANSORAL BIOPSY SINGLE/MULTIPLE N/A 6/15/2018    EGD ESOPHAGOGASTRODUODENOSCOPY performed by London Griggs MD at [address]  2004    Neck    SPINE SURGERY      synovial cyst    SPINE SURGERY  2012    rebuilt talibone    TONSILLECTOMY         Family History   Problem Relation Age of Onset    Other Mother         cerebral hemorrhage, psychiatric problems    Heart Attack Father     Cirrhosis Brother Social History:   Social History     Socioeconomic History    Marital status:      Spouse name: Not on file    Number of children: Not on file    Years of education: Not on file    Highest education level: Not on file   Occupational History    Not on file   Tobacco Use    Smoking status: Never Smoker    Smokeless tobacco: Never Used   Vaping Use    Vaping Use: Never used   Substance and Sexual Activity    Alcohol use: No     Comment: rare    Drug use: No    Sexual activity: Not Currently   Other Topics Concern    Not on file   Social History Narrative    Not on file     Social Determinants of Health     Financial Resource Strain:     Difficulty of Paying Living Expenses: Not on file   Food Insecurity:     Worried About Running Out of Food in the Last Year: Not on file    Fredis of Food in the Last Year: Not on file   Transportation Needs:     Lack of Transportation (Medical): Not on file    Lack of Transportation (Non-Medical):  Not on file   Physical Activity:     Days of Exercise per Week: Not on file    Minutes of Exercise per Session: Not on file   Stress:     Feeling of Stress : Not on file   Social Connections:     Frequency of Communication with Friends and Family: Not on file    Frequency of Social Gatherings with Friends and Family: Not on file    Attends Buddhism Services: Not on file    Active Member of [address] [place] [address] [employer] or Organizations: Not on file    Attends Club or Organization Meetings: Not on file    Marital Status: Not on file   Intimate Partner Violence:     Fear of Current or Ex-Partner: Not on file    Emotionally Abused: Not on file    Physically Abused: Not on file    Sexually Abused: Not on file   Housing Stability:     Unable to Pay for Housing in the Last Year: Not on file    Number of [place] in the Last Year: Not on file    Unstable Housing in the Last Year: Not on file     Smoking history: The patient is a Never smoker   ETOH:   reports no history of alcohol use. Exposures: There  is not history of TB or TB exposure. There is not asbestos or silica dust exposure. The patient reports does not have coal, foundry, quarry or [employer] exposure. Recent travel history none. There is not  history of recreational or IV drug use. There is no hot tub exposure. The patient does not have any exotic pets, turtles or exotic birds. Vaccines:    Influenza:  Refused  Pneumococcal Polysaccharide:  Up-to-date; approximate date: unsure  Immunization History   Administered Date(s) Administered    COVID-19, Pfizer Purple top, DILUTE for use, 12+ yrs, 30mcg/0.3mL dose 02/26/2021, 03/19/2021    Influenza Virus Vaccine 10/04/2014, 10/30/2016        Home Meds: Medications Prior to Admission: furosemide (LASIX) 40 MG tablet, Take 1 tablet by mouth daily  losartan (COZAAR) 50 MG tablet, Take 1 tablet by mouth daily  omeprazole (PRILOSEC) 40 MG delayed release capsule, Take 1 capsule by mouth daily  propranolol (INDERAL) 20 MG tablet, 1 in am and evening and 1.5 in afternoon (Patient taking differently: Take 20 mg by mouth 3 times daily 20 mg in am and evening and 30 mg in afternoon)  phenytoin (DILANTIN) 100 MG ER capsule, Take 1 capsule by mouth 2 times daily  fluticasone-salmeterol (ADVAIR DISKUS) 250-50 MCG/DOSE AEPB, Inhale 1 puff into the lungs every 12 hours  potassium chloride (KLOR-CON M) 20 MEQ extended release tablet, Take 20 mEq by mouth 2 times daily   doxepin (SINEQUAN) 10 MG capsule, Take 6 mg by mouth nightly  oxyCODONE (OXY-IR) 15 MG immediate release tablet, Take 15 mg by mouth every 6 hours as needed for Pain.   topiramate (TOPAMAX) 25 MG tablet,   diazepam (VALIUM) 10 MG tablet, Take 1 tablet by mouth every 12 hours as needed for Anxiety (Patient not taking: Reported on 4/25/2022)  hydrochlorothiazide (HYDRODIURIL) 25 MG tablet, Take 25 mg by mouth daily   aspirin 81 MG EC tablet, Take 1 tablet by mouth 2 times daily  atorvastatin (LIPITOR) 10 MG tablet, Take 1 tablet by mouth nightly  levothyroxine (SYNTHROID) 25 MCG tablet, Take 25 mcg by mouth daily    CURRENT MEDS :  Scheduled Meds:   aspirin  81 mg Oral BID    atorvastatin  10 mg Oral Nightly    hydroCHLOROthiazide  25 mg Oral Daily    levothyroxine  25 mcg Oral Daily    losartan  25 mg Oral Daily    phenytoin  100 mg Oral BID    propranolol  20 mg Oral QPM    propranolol  30 mg Oral Lunch    topiramate  25 mg Oral BID    sodium chloride flush  10 mL IntraVENous 2 times per day    enoxaparin  1 mg/kg SubCUTAneous BID    Arformoterol Tartrate  15 mcg Nebulization BID    And    budesonide  0.5 mg Nebulization BID       Continuous Infusions:   sodium chloride         Allergies   Allergen Reactions    Albuterol Hives    Iodine Shortness Of Breath    Vistaril [Hydroxyzine Hcl] Shortness Of Breath    Gabapentin Other (See Comments)     lethargic    Lyrica [Pregabalin]      lethargic    Skelaxin [Metaxalone] Other (See Comments)     Patient becomes sedated after taking Skelaxin       REVIEW OF SYSTEMS:  Constitutional: Denies fever, weight loss, night sweats, and fatigue  Skin: Denies pigmentation, dark lesions, and rashes   HEENT: Denies hearing loss, tinnitus, ear drainage, epistaxis, sore throat, and hoarseness. Cardiovascular: Denies palpitations, chest pain, and chest pressure. + chest heaviness  Respiratory: + chronic cough, dyspnea at rest, hemoptysis, apnea, and choking.    Gastrointestinal: Denies nausea, vomiting, poor appetite, diarrhea, heartburn or reflux  Genitourinary: Denies dysuria, frequency, urgency or hematuria  Musculoskeletal: Denies myalgias, muscle weakness, and bone pain  Neurological: Denies dizziness, vertigo, headache, and focal weakness  Psychological: Denies anxiety and depression  Endocrine: Denies heat intolerance and cold intolerance  Hematopoietic/Lymphatic: Denies bleeding problems and blood transfusions    OBJECTIVE:   /67   Pulse 116   Temp 97.5 °F (36.4 °C) (Oral)   Resp 16   Ht 5' (1.524 m)   Wt 170 lb (77.1 kg)   SpO2 94%   BMI 33.20 kg/m²   SpO2 Readings from Last 1 Encounters:   04/26/22 94%        I/O:  No intake or output data in the 24 hours ending 04/26/22 9534     Physical Exam:  General: The patient is lying in bed comfortably without any distress. Breathing is not labored  HEENT: Pupils are equal round and reactive to light, there are no oral lesions and no post-nasal drip   Neck: supple without adenopathy  Cardiovascular: regular rate and rhythm without murmur or gallop  Respiratory: Clear to auscultation bilaterally without wheezing or crackles. Air entry is symmetric  Abdomen: soft, non-tender, non-distended, normal bowel sounds  Extremities: warm, no edema, no clubbing  Skin: no rash or lesion  Neurologic: CN II-XII grossly intact, no focal deficits    Pulmonary Function Testing personally reviewed and interpreted  None on file    Imaging personally reviewed:  4/25/2022 cxr  No acute process. 4/25/2022 CTA chest  Pulmonary emboli involving pulmonary artery branch vessels of the left upper,   left lower and right lower lobes. Echo:  None on file    Labs:  Lab Results   Component Value Date    WBC 7.1 04/26/2022    HGB 14.2 04/26/2022    HCT 42.3 04/26/2022    MCV 89.6 04/26/2022    MCH 30.1 04/26/2022    MCHC 33.6 04/26/2022    RDW 13.2 04/26/2022     04/26/2022    MPV 10.4 04/26/2022     Lab Results   Component Value Date     04/26/2022    K 3.7 04/26/2022     04/26/2022    CO2 23 04/26/2022    BUN 14 04/26/2022    CREATININE 0.8 04/26/2022    LABALBU 3.5 07/01/2019    LABALBU 3.3 07/24/2011    CALCIUM 9.3 04/26/2022    GFRAA >60 04/26/2022    LABGLOM >60 04/26/2022     Lab Results   Component Value Date    PROTIME 12.8 06/15/2018    PROTIME 17.0 07/09/2011    INR 1.1 06/15/2018     Recent Labs     04/25/22  1534   PROBNP 226     No results for input(s): TROPONINI in the last 72 hours.   No results for input(s): PROCAL in the last 72 hours. This SmartLink has not been configured with any valid records. Micro:  No results for input(s): CULTRESP in the last 72 hours. No results for input(s): LABGRAM in the last 72 hours. No results for input(s): LEGUR in the last 72 hours. No results for input(s): STREPNEUMAGU in the last 72 hours. No results for input(s): LP1UAG in the last 72 hours. Assessment:  1. Atypical chest pain  2. Bilateral pulmonary emboli without cor pulmonale    Plan:  1. Currently on Lovenox for PE, will transition to Eliquis, lifelong therapy  2. Hold discharge today as she is tachycardic  3. Ambulatory pulse ox tomorrow  4. Incentive spirometer      Thank you for allowing me to participate in the care of Torsten Baez. Please feel free to call with questions. This plan of care was reviewed in collaboration with Dr. Nahun Landers    Electronically signed by GENARO Malcolm CNP on 4/26/2022 at 9:28 AM      Note: This report was completed utilizing computer voice recognition software. Every effort has been made to ensure accuracy, however; inadvertent computerized transcription errors may be present        I personally saw, examined, and cared for the patient. Labs, medications, radiographs reviewed. I agree with history exam and plans detailed in NP note. Patient with bilateral moderate clot burden pulm embolism. Unprovoked. Patient does have previous history of PE related to surgery. Chest pain related to bilateral pulmonary embolism with moderate clot burden. Tachycardia. Transition to Eliquis. Would hold off on discharge until tachycardia is improved.     Roldan Kaufman,

## 2022-04-26 NOTE — PROGRESS NOTES
Physical Therapy  Facility/Department: [address] INTERMEDIATE  Physical Therapy Initial Assessment    Name: Nellie Steele  : 1940  MRN: [id number]  Date of Service: 2022    Discharge Recommendations:             Patient Diagnosis(es): There were no encounter diagnoses. Past Medical History:  has a past medical history of Anxiety and depression, Arthritis, Asthma, Chronic back pain, Chronic kidney disease (CKD), stage II (mild), Chronic osteomyelitis of lumbar spine ([address] Foot pain, HTN (hypertension), benign, Hx of blood clots, Hx of migraines, Hyperlipidemia, Hypothyroidism, Hypothyroidism, Neuropathy of leg, Other postoperative infection, Radiculopathy, lumbar region, Seizures ([address] Sleeping difficulties, Spinal stenosis of lumbar region, Synovial cyst of lumbar spine, Thrombocytopenia ([address] Tremors of nervous system, Vitamin D deficiency, and Wears dentures. Past Surgical History:  has a past surgical history that includes lumbar laminectomy (); cervical fusion (?); joint replacement (); Spine surgery ( & ); Spine surgery (); Spine surgery (); Tonsillectomy; Colonoscopy;  section; Cholecystectomy (); eye surgery (Bilateral, ); back surgery; Spine surgery (); Knee Arthroplasty (Right, 2017); pr egd transoral biopsy single/multiple (N/A, 6/15/2018); and pr colonoscopy flx dx w/collj spec when pfrmd (N/A, 6/15/2018). Requires PT Follow-Up: Yes       Evaluating Therapist: Susy Blackman PT     Referring Provider:  SEAN Oconnell    PT order : PT eval and treat     Room #: 641  DIAGNOSIS:  B LE. Presented with chest pressure     PRECAUTIONS: falls     Social:  Pt lives alone  in a  1  floor plan  Apartment, no  steps  to enter. Prior to admission pt walked with  No AD      Initial Evaluation  Date: 2022  Treatment      Short Term/ Long Term   Goals   Was pt agreeable to Eval/treatment?  Yes      Does pt have pain?  chest pressure Bed Mobility  Rolling:  Independent   Supine to sit: Independent   Sit to supine:  Independent   Scooting:  Independent    independent    Transfers Sit to stand:  Independent   Stand to sit: Independent   Stand pivot:  NT    independent    Ambulation     200 feet with  No AD with  S/I    250 feet+  with  No AD  with  Independent        Stair negotiation: ascended and descended  NT   4 steps with 1 rail with independent    LE ROM  WFL     LE strength  4/ 5      AM- PAC RAW score   22/24            Pt is alert and Oriented x  3     Balance: S/I. No LOB with gait, but SOB . Fall risk due to decreased activity tolerance   Endurance: WFL, but decreased   Bed/Chair alarm:  No      ASSESSMENT  Pt displays functional ability as noted in the objective portion of this evaluation. Conditions Requiring Skilled Therapeutic Intervention:    [x]Decreased strength     []Decreased ROM  [x]Decreased functional mobility  [x]Decreased balance   [x]Decreased endurance   []Decreased posture  []Decreased sensation  []Decreased coordination   []Decreased vision  []Decreased safety awareness   []Increased pain     Treatment/Education:    Pt in bed  upon arrival ; agreeable to PT. Mobility as above. Pt with SOB after gait. Pulse ox 95-97% and -120 bpm throughout session. Pt educated on fall risk, safety with mobility        Patient response to education:   Pt verbalized understanding Pt demonstrated skill Pt requires further education in this area   x  x       Comments:  Pt left  In bed after session, with call light in reach. Rehab potential is Good for reaching above PT goals. Pts/ family goals   1. Home     Patient and or family understand(s) diagnosis, prognosis, and plan of care. - yes     PLAN  PT care will be provided in accordance with the objectives noted above. Whenever appropriate, clear delegation orders will be provided for nursing staff.   Exercises and functional mobility practice will be used as well as appropriate assistive devices or modalities to obtain goals. Patient and family education will also be administered as needed. PLAN OF CARE:    Current Treatment Recommendations     [x] Strengthening to improve independence with functional mobility   [] ROM to improve independence with functional mobility   [x] Balance Training to improve static/dynamic balance and to reduce fall risk  [x] Endurance Training to improve activity tolerance during functional mobility   [x] Transfer Training to improve safety and independence with all functional transfers   [x] Gait Training to improve gait mechanics, endurance and assess need for appropriate assistive device  [x] Stair Training in preparation for safe discharge home and/or into the community   [x] Positioning to prevent skin breakdown and contractures  [x] Safety and Education Training   [x] Patient/Caregiver Education   [] HEP  [] Other     Frequency of treatments will be 2-5x/week x  7 days. Time in: 1330   Time out: 1355      Evaluation Time includes thorough review of current medical information, gathering information on past medical history/social history and prior level of function, completion of standardized testing/informal observation of tasks, assessment of data and education on plan of care and goals.     CPT codes:  [x] Low Complexity PT evaluation 22605  [] Moderate Complexity PT evaluation 95834  [] High Complexity PT evaluation 36786  [] PT Re-evaluation 00922  [] Gait training 50472  minutes  [] Therapeutic activities 21789  minutes  [] Therapeutic exercises 53934  minutes  [] Neuromuscular reeducation 40627  minutes       Elizabeth 18 number:  PT [id number]

## 2022-04-26 NOTE — PROGRESS NOTES
Occupational Therapy  OCCUPATIONAL THERAPY INITIAL EVALUATION      Date:2022  Patient Name: Torsten Baez  MRN: [id number]  : 1940  Room: [address] [place] [address]         Date:2022                                                  Patient Name: Tosrten Baez    MRN: [id number]    : 1940    Room: [address] [place] [address]      Evaluating OT: Jeane Villalobos OTR/L   [id number]      Referring SEAN Hutson    Specific Provider Orders/Date:OT eval and treat 2022      Diagnosis:  Bilateral pulmonary embolism ([address] [I26.99]  Pulmonary embolism without acute cor pulmonale, unspecified chronicity, unspecified pulmonary embolism type ([address] [I26.99]     Pertinent Medical History: pulmonary embolism and DVT    Precautions:  Low fall risk     COMMENTS:   Eval only. Patient able to complete own self care. No acute OT needs at this time. Home Living: Pt lives alone, 1 floor apartment with no steps.    Laundry on 2nd floor - neighbors assist    Son lives nearby and assists as needed   Bathroom setup: tub/shower, tub seat   Equipment owned: cane     Prior Level of Function: Independent  with ADLs , assist as needed  with IADLs; ambulated with cane occasionally       Pain Level: no pain ;   Cognition: A&O: 4/4;    Memory:  Good    Sequencing:  Good    Problem solving:  Good    Judgement/safety:  Good      Functional Assessment:  AM-PAC Daily Activity Raw Score:    Initial Eval Status  Date: 22   Feeding Independent    Grooming Independent   Standing at sink    UB Dressing Independent    LB Dressing Independent   Don/doff socks    Bathing Independent    Toileting Independent    Bed Mobility  Independent   Supine to sit    Functional Transfers Independent   Sit-stand from bed , chair   Functional Mobility Supervision, no device   Ambulating in room   No LOB or SOB Balance Sitting:     Static:  Independent     Dynamic:Independent   Standing: Independent    Activity Tolerance Good with light activity  On room air   O2 sats 90s     Visual/  Perceptual Glasses: none by bedside            Hand Dominance right    AROM (PROM) Strength Additional Info:    RUE  WFL WFL good  and wfl FMC/dexterity noted during ADL tasks       LUE WFL WFL good  and wfl FMC/dexterity noted during ADL tasks       Hearing: WFL   Sensation:  No c/o numbness or tingling  Tone: WFL   Edema: none observed     Comments: Upon arrival patient lying in bed . At end of session, patient sitting in chair with call light and phone within reach, all lines and tubes intact. Patient / Family Goal: return home       Eval Complexity: Low    Time In: 1136  Time Out: 1150    Min Units   OT Eval Low 97165 x  1   OT Eval Medium 80829      OT Eval High 73843      OT Re-Eval [id number]       Therapeutic Ex [id number]       Therapeutic Activities 87487       ADL/Self Care 51510       Orthotic Management 34041       Manual 23682     Neuro Re-Ed 16269       Non-Billable Time         Evaluation Time additionally includes thorough review of current medical information, gathering information on past medical history/social history and prior level of function, interpretation of standardized testing/informal observation of tasks, assessment of data and development of plan of care and goals.             Suman Grey  OTR/L  OT [id number]

## 2022-04-26 NOTE — CARE COORDINATION
Social Work / Discharge Planning : Patient admitted with bilateral Pulmonary Embolism. Therapy am/pac 24/24. Patient resides alone. Goal at discharge is HOME. Await Pulmonary plan. If patient is going to be discharged on Eliquis, patient is eligible for a 30 day free card in which SW/CM will provide at discharge. Patient PCP is DR Marco A Escobar and she has [facility]. SW to follow.  Electronically signed by GUERO Keys on 4/26/22 at 1:52 PM EDT

## 2022-04-26 NOTE — H&P
[facility]  History and Physical      CHIEF COMPLAINT:    Chief Complaint   Patient presents with    Chest Pain     on cayla patient got first pain in shoulder blade area of left side. states it went away. now coming and going but not reproducible.  Shoulder Pain        Patient of Akira Tariq MD presents with:  Bilateral pulmonary embolism [facility])    History of Present Illness:   Patient is an 81-year-old female with a past medical history of anxiety/depression, asthma, CKD, hypertension, history of blood clots in 2011, hypothyroidism, hyperlipidemia, seizure, spinal stenosis, thrombocytopenia, and vitamin D deficiency. Patient presented to the ED with complaints of chest pain and shoulder pain that has been ongoing for the past week. Patient states it does not radiate and it did not cause any nausea or diaphoresis. Patient is currently on room air however she did complain of some shortness of breath with exertion but she did not require any O2. Patient states when she had her previous PEs DVTs in 2011 it was following a surgical procedure. Patient is alert and oriented on examination. Patient denies any fever, chills, headache, dizziness, blurred vision, and abdominal pain. ER work-up revealed elevated D-dimer of 2071, and CTA pulmonary revealed PEs. Patient was admitted to telemetry unit for further testing and treatment. REVIEW OF SYSTEMS:  Pertinent negatives are above in HPI. 10 point ROS otherwise negative. Past Medical History:   Diagnosis Date    Anxiety and depression 7/13/2016    Arthritis     osteo    Asthma     has chronic couch    Chronic back pain     Chronic kidney disease (CKD), stage II (mild) 04/19/2012    Creatinine around 1.3 to 1.4 in April 2012 secondary to IV antibiotics following back surgery.     [facility]     note on chart from Dr. Wero Reis dated 8/17/2017    Foot pain     after back surgery / chronic    HTN (hypertension), benign 1/10/2015    Hx of blood clots      / DVT, PE    Hx of migraines     Hyperlipidemia     Hypothyroidism     Hypothyroidism 2016    Neuropathy of leg     bilateral    Other postoperative infection 2011    Was on Vancomycin following the surgery for the removal of lumbar cysts. Patient on antibiotics for life     Radiculopathy, lumbar region 2012    Seizures ([address]     last seizure 2016 / gran mal    Sleeping difficulties     takes [place]    Spinal stenosis of lumbar region     Synovial cyst of lumbar spine 2011    Thrombocytopenia ([address] 2016    follows only with PCP    Tremors of nervous system     hands    Vitamin D deficiency     Wears dentures     upper         Past Surgical History:   Procedure Laterality Date    BACK SURGERY      multiple    CERVICAL FUSION  ?      SECTION      CHOLECYSTECTOMY      lap    COLONOSCOPY      EYE SURGERY Bilateral     cataract extraction    JOINT REPLACEMENT  2010    left    KNEE ARTHROPLASTY Right 2017    Total right knee arthroplasty    LUMBAR LAMINECTOMY  1985    CO COLONOSCOPY FLX DX W/COLLJ SPEC WHEN PFRMD N/A 6/15/2018    COLONOSCOPY DIAGNOSTIC performed by Marcial Graff MD at [address] EGD TRANSORAL BIOPSY SINGLE/MULTIPLE N/A 6/15/2018    EGD ESOPHAGOGASTRODUODENOSCOPY performed by Marcial Graff MD at [address]      Neck    SPINE SURGERY      synovial cyst    SPINE SURGERY  2012    rebuilt talibone    TONSILLECTOMY         Medications Prior to Admission:    Medications Prior to Admission: furosemide (LASIX) 40 MG tablet, Take 1 tablet by mouth daily  losartan (COZAAR) 50 MG tablet, Take 1 tablet by mouth daily  omeprazole (PRILOSEC) 40 MG delayed release capsule, Take 1 capsule by mouth daily  propranolol (INDERAL) 20 MG tablet, 1 in am and evening and 1.5 in afternoon (Patient taking differently: Take 20 mg by mouth 3 times daily 20 mg in am and evening and 30 mg in afternoon)  phenytoin (DILANTIN) 100 MG ER capsule, Take 1 capsule by mouth 2 times daily  fluticasone-salmeterol (ADVAIR DISKUS) 250-50 MCG/DOSE AEPB, Inhale 1 puff into the lungs every 12 hours  potassium chloride (KLOR-CON M) 20 MEQ extended release tablet, Take 20 mEq by mouth 2 times daily   doxepin (SINEQUAN) 10 MG capsule, Take 6 mg by mouth nightly  oxyCODONE (OXY-IR) 15 MG immediate release tablet, Take 15 mg by mouth every 6 hours as needed for Pain. topiramate (TOPAMAX) 25 MG tablet,   diazepam (VALIUM) 10 MG tablet, Take 1 tablet by mouth every 12 hours as needed for Anxiety (Patient not taking: Reported on 4/25/2022)  hydrochlorothiazide (HYDRODIURIL) 25 MG tablet, Take 25 mg by mouth daily   aspirin 81 MG EC tablet, Take 1 tablet by mouth 2 times daily  atorvastatin (LIPITOR) 10 MG tablet, Take 1 tablet by mouth nightly  levothyroxine (SYNTHROID) 25 MCG tablet, Take 25 mcg by mouth daily    Note that the patient's home medications were reviewed and the above list is accurate to the best of my knowledge at the time of the exam.    Allergies:    Albuterol, Iodine, Vistaril [hydroxyzine hcl], Gabapentin, Lyrica [pregabalin], and Skelaxin [metaxalone]    Social History:    reports that she has never smoked. She has never used smokeless tobacco. She reports that she does not drink alcohol and does not use drugs. Family History:   family history includes Cirrhosis in her brother; Heart Attack in her father; Other in her mother.       PHYSICAL EXAM:    Vitals:  /67   Pulse 120   Temp 97.9 °F (36.6 °C) (Oral)   Resp 16   Ht 5' (1.524 m)   Wt 170 lb (77.1 kg)   SpO2 96%   BMI 33.20 kg/m²       General appearance: NAD, conversant, pleasant  Eyes: Sclerae anicteric, PERRLA  HEENT: AT/NC, MMM  Neck: FROM, supple, no thyromegaly  Lymph: No cervical / supraclavicular lymphadenopathy  Lungs: Clear to auscultation, WOB normal  CV: RRR, no MRGs, no lower extremity edema  Abdomen: Soft, non-tender; no masses or HSM, +BS  Extremities: FROM without synovitis. No clubbing or cyanosis of the hands. Skin: no rash, induration, lesions, or ulcers  Psych: Calm and cooperative. Normal judgement and insight. Normal mood and affect. Neuro: Alert and interactive, face symmetric, speech fluent. LABS:  All labs reviewed. Of note:  CBC with Differential:    Lab Results   Component Value Date    WBC 7.1 04/26/2022    RBC 4.72 04/26/2022    HGB 14.2 04/26/2022    HCT 42.3 04/26/2022     04/26/2022    MCV 89.6 04/26/2022    MCH 30.1 04/26/2022    MCHC 33.6 04/26/2022    RDW 13.2 04/26/2022    SEGSPCT 54 07/24/2011    LYMPHOPCT 20.5 04/26/2022    MONOPCT 3.1 04/26/2022    BASOPCT 0.4 04/26/2022    MONOSABS 0.22 04/26/2022    LYMPHSABS 1.45 04/26/2022    EOSABS 0.00 04/26/2022    BASOSABS 0.03 04/26/2022     CMP:    Lab Results   Component Value Date     04/26/2022    K 3.7 04/26/2022     04/26/2022    CO2 23 04/26/2022    BUN 14 04/26/2022    CREATININE 0.8 04/26/2022    GFRAA >60 04/26/2022    LABGLOM >60 04/26/2022    GLUCOSE 157 04/26/2022    GLUCOSE 105 07/24/2011    PROT 6.7 07/01/2019    LABALBU 3.5 07/01/2019    LABALBU 3.3 07/24/2011    CALCIUM 9.3 04/26/2022    BILITOT 0.2 07/01/2019    ALKPHOS 166 07/01/2019    AST 24 07/01/2019    ALT 25 07/01/2019       Imaging:  CXR: WNL    CTA pulmonary: Pulmonary emboli involving pulmonary artery branch vessels of the left upper, left lower and right lower lobes. EKG:  Accelerated junctional    Telemetry:  I've personally reviewed the patient's telemetry:  Junctional    ASSESSMENT/PLAN:  Principal Problem:    Bilateral pulmonary embolism (HCC)  Active Problems:    Pulmonary embolism without acute cor pulmonale (HCC)  Resolved Problems:    * No resolved hospital problems.  *    81year-old with a history of DVT/PEs is admitted to telemetry unit with    Unprovoked bilateral PE's  Lovenox 1 mg/kg twice daily-OAC upon discharge  Ambulating pulse ox in a.m. ISP  Echo - no evidence of right heart strain -will monitor patient  Consult pulmonary-appreciate input    Medication for other comorbidities continue as appropriate dose adjustment as necessary. DVT prophylaxis  PT OT  Discharge planning  Case discussed with attending and agreed upon plan of care. Code status: Full  Requires inpatient level of care  GENARO Munoz CNP    7:47 AM  4/26/2022     Above note edited to reflect my thoughts   Resting comfortably no distress  Not requiring oxygen  Very hard of hearing  Transition to p.o. Eliquis 10 mg p.o. twice daily X7  days followed by 5 twice daily 3 to 6 months  Bilateral lower extremity ultrasounds  No immediate need for echocardiogram if patient is hemodynamically stable without concern for cor pulmonale    I personally saw, examined and provided care for the patient. Radiographs, labs and medication list were reviewed by me independently. The case was discussed in detail and plans for care were established. Review of Catie HERNANDEZ CNP, documentation was conducted and revisions were made as appropriate directly by me. I agree with the above documented exam, problem list, and plan of care.      Karey Lopes MD  9:08 PM  4/26/2022

## 2022-04-27 VITALS
DIASTOLIC BLOOD PRESSURE: 55 MMHG | RESPIRATION RATE: 18 BRPM | OXYGEN SATURATION: 99 % | SYSTOLIC BLOOD PRESSURE: 111 MMHG | TEMPERATURE: 97.8 F | HEIGHT: 60 IN | WEIGHT: 175.6 LBS | HEART RATE: 98 BPM | BODY MASS INDEX: 34.47 KG/M2

## 2022-04-27 PROCEDURE — 97530 THERAPEUTIC ACTIVITIES: CPT

## 2022-04-27 PROCEDURE — 6370000000 HC RX 637 (ALT 250 FOR IP): Performed by: INTERNAL MEDICINE

## 2022-04-27 PROCEDURE — 2580000003 HC RX 258: Performed by: PHYSICIAN ASSISTANT

## 2022-04-27 PROCEDURE — 6370000000 HC RX 637 (ALT 250 FOR IP): Performed by: PHYSICIAN ASSISTANT

## 2022-04-27 RX ORDER — LANOLIN ALCOHOL/MO/W.PET/CERES
1.5 CREAM (GRAM) TOPICAL NIGHTLY PRN
Qty: 30 TABLET | Refills: 3 | Status: SHIPPED | OUTPATIENT
Start: 2022-04-27 | End: 2022-05-16 | Stop reason: ALTCHOICE

## 2022-04-27 RX ORDER — LOSARTAN POTASSIUM 25 MG/1
25 TABLET ORAL DAILY
Qty: 30 TABLET | Refills: 3 | Status: SHIPPED | OUTPATIENT
Start: 2022-04-28

## 2022-04-27 RX ADMIN — PHENYTOIN SODIUM 100 MG: 100 CAPSULE ORAL at 08:22

## 2022-04-27 RX ADMIN — PROPRANOLOL HYDROCHLORIDE 30 MG: 10 TABLET ORAL at 12:35

## 2022-04-27 RX ADMIN — APIXABAN 10 MG: 5 TABLET, FILM COATED ORAL at 08:22

## 2022-04-27 RX ADMIN — OXYCODONE 15 MG: 15 TABLET ORAL at 08:28

## 2022-04-27 RX ADMIN — SODIUM CHLORIDE, PRESERVATIVE FREE 10 ML: 5 INJECTION INTRAVENOUS at 08:24

## 2022-04-27 RX ADMIN — TOPIRAMATE 25 MG: 25 TABLET, FILM COATED ORAL at 08:22

## 2022-04-27 RX ADMIN — ASPIRIN 81 MG: 81 TABLET, COATED ORAL at 08:22

## 2022-04-27 RX ADMIN — LEVOTHYROXINE SODIUM 25 MCG: 25 TABLET ORAL at 06:56

## 2022-04-27 ASSESSMENT — PAIN DESCRIPTION - DESCRIPTORS: DESCRIPTORS: SHARP

## 2022-04-27 ASSESSMENT — PAIN SCALES - GENERAL
PAINLEVEL_OUTOF10: 0
PAINLEVEL_OUTOF10: 7

## 2022-04-27 NOTE — PROGRESS NOTES
P Quality Flow/Interdisciplinary Rounds Progress Note        Quality Flow Rounds held on April 27, 2022    Disciplines Attending:  Bedside Nurse, ,  and Nursing Unit Leadership    Anisha Lima was admitted on 4/25/2022  2:35 PM    Anticipated Discharge Date:       Disposition:    Santos Score:  Santos Scale Score: 20    Readmission Risk              Risk of Unplanned Readmission:  12           Discussed patient goal for the day, patient clinical progression, and barriers to discharge. The following Goal(s) of the Day/Commitment(s) have been identified:  check plan with consults, discharge?        Kendra Gan RN  April 27, 2022

## 2022-04-27 NOTE — CARE COORDINATION
Social Work / Discharge PLanning : Discharge order noted. SW followed up with patient and provided Eliquis card. SW also provided a A.L. list for future goal of patient. Patient had many questions in regards to A.L. living in which SW was able to address. No other needs noted. SW to follow.  Electronically signed by GUERO Merritt on 4/27/22 at 12:41 PM EDT

## 2022-04-27 NOTE — PROGRESS NOTES
P Quality Flow/Interdisciplinary Rounds Progress Note        Quality Flow Rounds held on April 27, 2022    Disciplines Attending:  Bedside Nurse, ,  and Nursing Unit Leadership    Milka Wallace was admitted on 4/25/2022  2:35 PM    Anticipated Discharge Date:       Disposition:    Santos Score:  Santos Scale Score: 20    Readmission Risk              Risk of Unplanned Readmission:  12           Discussed patient goal for the day, patient clinical progression, and barriers to discharge. The following Goal(s) of the Day/Commitment(s) have been identified:  check plan with consults, discharge?       Jimmie Rosa, ISMAEL  April 27, 2022

## 2022-04-27 NOTE — PROGRESS NOTES
CLINICAL PHARMACY NOTE: MEDS TO BEDS    Total # of Prescriptions Filled: 2   The following medications were delivered to the patient:  · Eliquis 5  · Losartan 25    Additional Documentation:

## 2022-04-27 NOTE — DISCHARGE SUMMARY
[facility]   Discharge summary   Patient ID:  Cy Tolbert  [id number]  81 y.o.  1940    Admit date: 4/25/2022    Discharge date and time: 4/27/2022    Admission Diagnoses:   Patient Active Problem List   Diagnosis    HTN (hypertension), benign    Asthma    History of DVT (deep vein thrombosis)    DANAE (acute kidney injury) ([facility]    CKD (chronic kidney disease) stage 2, GFR 60-89 ml/min    Acquired hypothyroidism    History of epilepsy    Acute metabolic encephalopathy    RSV (respiratory syncytial virus pneumonia)    Obesity (BMI 30-39. 9)    Bilateral pulmonary embolism (HCC)    Pulmonary embolism without acute cor pulmonale ([facility]       Discharge Diagnoses: Pulmonary Emolism    Consults: pulmonary/intensive care    Procedures: None    Hospital Course: The patient is a 81 y.o. female of Suleiman Sandoval MD with a past medical history of anxiety/depression, asthma, CKD, hypertension, history of blood clots in 2011, hypothyroidism, hyperlipidemia, seizure, spinal stenosis, thrombocytopenia, and vitamin D deficiency. Patient presented to the ED with complaints of chest pain and shoulder pain that has been ongoing for the past week. Patient states it does not radiate and it did not cause any nausea or diaphoresis. 81year-old with a history of DVT/PEs is admitted to telemetry unit with     Unprovoked bilateral PE's  Lovenox 1 mg/kg twice daily-OAC upon discharge - Eliquis 10 mg bid x 7 days 5 mg bid lifelone  Ambulating pulse ox - patient does not require any O2 needs upon discharge  ISP  Echo - no evidence of right heart strain -will monitor patient  Consult pulmonary-appreciate input -     Patient was discharged home in stable condition with medications listed below. Patient was instructed to follow up with all consults and PCP within the next two week.       Recent Labs     04/25/22  1534 04/26/22  0614   WBC 8.5 7.1   HGB 14.7 14.2   HCT 43.5 42.3    210       Recent Labs 04/25/22  1534 04/26/22  0614    138   K 3.7 3.7    103   CO2 27 23   BUN 12 14   CREATININE 0.8 0.8   CALCIUM 9.4 9.3       XR CHEST PORTABLE    Result Date: 4/25/2022  EXAMINATION: ONE XRAY VIEW OF THE CHEST 4/25/2022 3:09 pm COMPARISON: 06/13/2018 HISTORY: ORDERING SYSTEM PROVIDED HISTORY: chest pain TECHNOLOGIST PROVIDED HISTORY: Reason for exam:->chest pain FINDINGS: The lungs are without acute focal process. There is no effusion or pneumothorax. The cardiomediastinal silhouette is without acute process. The osseous structures are without acute process. No acute process. CTA PULMONARY W CONTRAST    Addendum Date: 4/25/2022    ADDENDUM: Findings discussed on 04/25/2022 at 6:03 p.m. with Dr. Galindo Gramajo     Result Date: 4/25/2022  EXAMINATION: CTA OF THE CHEST 4/25/2022 5:09 pm TECHNIQUE: CTA of the chest was performed after the administration of intravenous contrast.  Multiplanar reformatted images are provided for review. MIP images are provided for review. Dose modulation, iterative reconstruction, and/or weight based adjustment of the mA/kV was utilized to reduce the radiation dose to as low as reasonably achievable. COMPARISON: None. HISTORY: ORDERING SYSTEM PROVIDED HISTORY: eval for PE, hx of PE TECHNOLOGIST PROVIDED HISTORY: Reason for exam:->eval for PE, hx of PE Decision Support Exception - unselect if not a suspected or confirmed emergency medical condition->Emergency Medical Condition (MA) FINDINGS: Pulmonary Arteries: Pulmonary arteries are adequately opacified for evaluation. No evidence of intraluminal filling defect to suggest pulmonary embolism. Main pulmonary artery is normal in caliber. Pulmonary emboli involving pulmonary branch vessels to the left upper left lower and right lower lobes. Mediastinum: No evidence of mediastinal lymphadenopathy. The heart and pericardium demonstrate no acute abnormality. There is no acute abnormality of the thoracic aorta.  Lungs/pleura: The lungs are without acute process. No focal consolidation or pulmonary edema. No evidence of pleural effusion or pneumothorax. Benign 4 mm calcified nodule left lower lobe. No further workup necessary. Upper Abdomen: Limited images of the upper abdomen are unremarkable. Soft Tissues/Bones: No acute bone or soft tissue abnormality. Degenerative changes thoracic spine. Pulmonary emboli involving pulmonary artery branch vessels of the left upper, left lower and right lower lobes. Discharge Exam:    HEENT: NCAT,  PERRLA, No JVD  Heart:  RRR, no murmurs, gallops, or rubs. Lungs:  CTA bilaterally, no wheeze, rales or rhonchi  Abd: bowel sounds present, nontender, nondistended, no masses  Extrem:  No clubbing, cyanosis, or edema    Disposition: home     Patient Condition at Discharge: Stable    Patient Instructions:      Medication List        START taking these medications      * apixaban 5 MG Tabs tablet  Commonly known as: ELIQUIS  Take 2 tablets by mouth 2 times daily for 12 doses     * apixaban 5 MG Tabs tablet  Commonly known as: ELIQUIS  Take 1 tablet by mouth 2 times daily  Start taking on: May 3, 2022     melatonin 3 MG Tabs tablet  Take 0.5 tablets by mouth nightly as needed (sleep)           * This list has 2 medication(s) that are the same as other medications prescribed for you. Read the directions carefully, and ask your doctor or other care provider to review them with you. CHANGE how you take these medications      losartan 25 MG tablet  Commonly known as: COZAAR  Take 1 tablet by mouth daily  Start taking on: April 28, 2022  What changed: Another medication with the same name was removed. Continue taking this medication, and follow the directions you see here.      propranolol 20 MG tablet  Commonly known as: INDERAL  1 in am and evening and 1.5 in afternoon  What changed:   how much to take  how to take this  when to take this  additional instructions            CONTINUE taking these medications      atorvastatin 10 MG tablet  Commonly known as: LIPITOR  Take 1 tablet by mouth nightly     diazePAM 10 MG tablet  Commonly known as: VALIUM  Take 1 tablet by mouth every 12 hours as needed for Anxiety     doxepin 10 MG capsule  Commonly known as: SINEQUAN     fluticasone-salmeterol 250-50 MCG/DOSE Aepb  Commonly known as: Advair Diskus  Inhale 1 puff into the lungs every 12 hours     furosemide 40 MG tablet  Commonly known as: LASIX     hydroCHLOROthiazide 25 MG tablet  Commonly known as: HYDRODIURIL     levothyroxine 25 MCG tablet  Commonly known as: SYNTHROID     omeprazole 40 MG delayed release capsule  Commonly known as: PRILOSEC     oxyCODONE 15 MG immediate release tablet  Commonly known as: OXY-IR     phenytoin 100 MG ER capsule  Commonly known as: DILANTIN  Take 1 capsule by mouth 2 times daily     potassium chloride 20 MEQ extended release tablet  Commonly known as: KLOR-CON M     topiramate 25 MG tablet  Commonly known as: TOPAMAX            STOP taking these medications      aspirin 81 MG EC tablet               Where to Get Your Medications        These medications were sent to [address] [place] [address], [address] [place] [address]      Phone: [phone]   apixaban 5 MG Tabs tablet  apixaban 5 MG Tabs tablet  losartan 25 MG tablet  melatonin 3 MG Tabs tablet       Activity: activity as tolerated  Diet: cardiac diet    Pt has been advised to: Follow-up with Kishor Esposito MD in 1 week. Follow-up with consultants as recommended by them    Note that over 30 minutes was spent in preparing discharge papers, discussing discharge with patient, medication review, etc.    Signed:  GENARO Mathew CNP  4/27/2022    Above note edited to reflect my thoughts     I personally saw, examined and provided care for the patient.  Radiographs, labs and medication list were reviewed by me independently. The case was discussed in detail and plans for care were established. Review of Catie LAM- CNP, documentation was conducted and revisions were made as appropriate directly by me. I agree with the above documented exam, problem list, and plan of care.      Payal Lynch MD  5/2/2022

## 2022-04-27 NOTE — PROGRESS NOTES
Haydee Osuna M.D.,[facility]  Osmin Zamorano D.O., F.A.C.O.I., Debbie Tovar M.D. Oanh Gilman M.D. Nikko Jones D.O. Daily Pulmonary Progress Note    Patient:  Yrn Tejada 81 y.o. female MRN: [id number]     Date of Service: 4/27/2022      Synopsis     We are following patient for pulmonary emboli    \"CC\" shortness of breath    Code status: Full code      Subjective      Patient was seen and examined lying in bed in no apparent distress. She is currently on room air and has started on Eliquis with no side effects. She did mention that this past February she had possible TIA symptoms where she had cramping in her hands, and unsteady gait, and difficulty with speech. This was witnessed by her son, symptoms resolved by the next day. She did not seek medical attention for this episode. Review of Systems:  Constitutional: Denies fever, weight loss, night sweats, and fatigue  Skin: Denies pigmentation, dark lesions, and rashes   HEENT: Denies hearing loss, tinnitus, ear drainage, epistaxis, sore throat, and hoarseness. Cardiovascular: Denies palpitations, chest pain, and chest pressure. Respiratory: Denies cough, dyspnea at rest, hemoptysis, apnea, and choking.   Gastrointestinal: Denies nausea, vomiting, poor appetite, diarrhea, heartburn or reflux  Genitourinary: Denies dysuria, frequency, urgency or hematuria  Musculoskeletal: Denies myalgias, muscle weakness, and bone pain  Neurological: Denies dizziness, vertigo, headache, and focal weakness  Psychological: Denies anxiety and depression  Endocrine: Denies heat intolerance and cold intolerance  Hematopoietic/Lymphatic: Denies bleeding problems and blood transfusions    24-hour events:  None    Objective   Vitals: /78 Comment: manual  Pulse 77   Temp 97.8 °F (36.6 °C) (Oral)   Resp 18   Ht 5' (1.524 m)   Wt 175 lb 9.6 oz (79.7 kg)   SpO2 95%   BMI 34.29 kg/m²     I/O:  No intake or output data in the 24 hours ending 04/27/22 1208    CURRENT MEDS :  Scheduled Meds:   apixaban  10 mg Oral BID    Followed by   Barbara Kohli ON 5/3/2022] apixaban  5 mg Oral BID    doxepin  6 mg Oral Nightly    aspirin  81 mg Oral BID    atorvastatin  10 mg Oral Nightly    hydroCHLOROthiazide  25 mg Oral Daily    levothyroxine  25 mcg Oral Daily    losartan  25 mg Oral Daily    phenytoin  100 mg Oral BID    propranolol  20 mg Oral QPM    propranolol  30 mg Oral Lunch    topiramate  25 mg Oral BID    sodium chloride flush  10 mL IntraVENous 2 times per day       Physical Exam:  General Appearance: appears comfortable in no acute distress. HEENT: Normocephalic atraumatic without obvious abnormality   Neck: Lips, mucosa, and tongue normal.  Supple, symmetrical, trachea midline, no adenopathy;thyroid:  no enlargement/tenderness/nodules or JVD. Lung: Breath sounds CTA. Respirations   unlabored. Symmetrical expansion. Heart: RRR, normal S1, S2. No MRG  Abdomen: Soft, NT, ND. BS present x 4 quadrants. No bruit or organomegaly. Extremities: Pedal pulses 2+ symmetric b/l. Extremities normal, no cyanosis, clubbing, or edema. Musculokeletal: No joint swelling, no muscle tenderness. ROM normal in all joints of extremities. Neurologic: Mental status: Alert and Oriented X3 .     Pertinent/ New Labs and Imaging Studies     Imaging Personally Reviewed:  4/25/2022 cxr  No acute process.     4/25/2022 CTA chest  Pulmonary emboli involving pulmonary artery branch vessels of the left upper,   left lower and right lower lobes.      Echo:  None on file    Labs:  Lab Results   Component Value Date    WBC 7.1 04/26/2022    HGB 14.2 04/26/2022    HCT 42.3 04/26/2022    MCV 89.6 04/26/2022    MCH 30.1 04/26/2022    MCHC 33.6 04/26/2022    RDW 13.2 04/26/2022     04/26/2022    MPV 10.4 04/26/2022     Lab Results   Component Value Date     04/26/2022    K 3.7 04/26/2022     04/26/2022    CO2 23 04/26/2022    BUN 14 04/26/2022    CREATININE 0.8 04/26/2022    LABALBU 3.5 07/01/2019    LABALBU 3.3 07/24/2011    CALCIUM 9.3 04/26/2022    GFRAA >60 04/26/2022    LABGLOM >60 04/26/2022     Lab Results   Component Value Date    PROTIME 12.8 06/15/2018    PROTIME 17.0 07/09/2011    INR 1.1 06/15/2018     Recent Labs     04/25/22  1534   PROBNP 226     No results for input(s): PROCAL in the last 72 hours. This SmartLink has not been configured with any valid records. Micro:  No results for input(s): CULTRESP in the last 72 hours. No results for input(s): LABGRAM in the last 72 hours. No results for input(s): LEGUR in the last 72 hours. No results for input(s): STREPNEUMAGU in the last 72 hours. No results for input(s): LP1UAG in the last 72 hours. Assessment:    1. Atypical chest pain  2. Bilateral pulmonary emboli without cor pulmonale    Plan:   1. Started on Eliquis, will continue therapy lifelong  2. Ambulatory pulse ox before discharge  3. Incentive spirometer    Okay to discharge per pulmonary service, follow-up note routed to office    This plan of care was reviewed in collaboration with Dr. Nella Rouse  Electronically signed by GENARO Rebolledo CNP on 4/27/2022 at 12:08 PM      I personally saw, examined, and cared for the patient. Labs, medications, radiographs reviewed. I agree with history exam and plans detailed in NP note.         Vincenzo Winchester DO

## 2022-04-27 NOTE — PROGRESS NOTES
Physical Therapy  Facility/Department: [address] INTERMEDIATE  Daily Treatment Note  NAME: Leslye Coleman  : 1940  MRN: [id number]    Date of Service: 2022   Evaluating Therapist: Nuris Lockett PT      Referring Provider:  SEAN Oneil     PT order : PT eval and treat      Room #: 641  DIAGNOSIS:  B LE. Presented with chest pressure      PRECAUTIONS: falls      Social:  Pt lives alone  in a  1  floor plan  Apartment, no  steps  to enter. Prior to admission pt walked with  No AD        Initial Evaluation  Date: 2022  Treatment      Short Term/ Long Term   Goals   Was pt agreeable to Eval/treatment? Yes   yes     Does pt have pain?  chest pressure   none reported     Bed Mobility  Rolling:  Independent   Supine to sit: Independent   Sit to supine:  Independent   Scooting:  Independent   independent  independent    Transfers Sit to stand:  Independent   Stand to sit: Independent   Stand pivot:  NT   independent  independent    Ambulation     200 feet with  No AD with  S/I   300 feet without device S/I  250 feet+  with  No AD  with  Independent    Stair negotiation: ascended and descended  NT   NT - pt reported she does need to do steps at home and was told he shouldn't perform steps. 4 steps with 1 rail with independent    LE ROM  WFL       LE strength  4/ 5        AM- PAC RAW score              Vitals:  SPO2 after ambulation was 97% on room air. Patient education  Pt educated on PT objectives during treatment session. Patient response to education:   Pt verbalized understanding Pt demonstrated skill Pt requires further education in this area       yes     ASSESSMENT:    Comments:  Pt found in bed and left sitting on the EOB with call light in reach and hospital employee present. Treatment:  Patient practiced and was instructed in the following treatment:    Functional mobility performed as documented above. No report of dizziness during functional mobility.   Pt

## 2022-04-28 ENCOUNTER — APPOINTMENT (OUTPATIENT)
Dept: CT IMAGING | Age: 82
DRG: 193 | End: 2022-04-28
Payer: MEDICARE

## 2022-04-28 ENCOUNTER — HOSPITAL ENCOUNTER (INPATIENT)
Age: 82
LOS: 1 days | Discharge: HOME | DRG: 193 | End: 2022-05-02
Attending: STUDENT IN AN ORGANIZED HEALTH CARE EDUCATION/TRAINING PROGRAM | Admitting: INTERNAL MEDICINE
Payer: MEDICARE

## 2022-04-28 ENCOUNTER — APPOINTMENT (OUTPATIENT)
Dept: GENERAL RADIOLOGY | Age: 82
DRG: 193 | End: 2022-04-28
Payer: MEDICARE

## 2022-04-28 DIAGNOSIS — I26.99 BILATERAL PULMONARY EMBOLISM (HCC): Primary | ICD-10-CM

## 2022-04-28 LAB
ALBUMIN SERPL-MCNC: 3.6 G/DL (ref 3.5–5.2)
ALP BLD-CCNC: 208 U/L (ref 35–104)
ALT SERPL-CCNC: 37 U/L (ref 0–32)
ANION GAP SERPL CALCULATED.3IONS-SCNC: 11 MMOL/L (ref 7–16)
AST SERPL-CCNC: 28 U/L (ref 0–31)
BACTERIA: ABNORMAL /HPF
BASOPHILS ABSOLUTE: 0.1 E9/L (ref 0–0.2)
BASOPHILS RELATIVE PERCENT: 0.9 % (ref 0–2)
BILIRUB SERPL-MCNC: 0.2 MG/DL (ref 0–1.2)
BILIRUBIN URINE: NEGATIVE
BLOOD, URINE: NEGATIVE
BUN BLDV-MCNC: 28 MG/DL (ref 6–23)
CALCIUM SERPL-MCNC: 9.2 MG/DL (ref 8.6–10.2)
CHLORIDE BLD-SCNC: 101 MMOL/L (ref 98–107)
CLARITY: CLEAR
CO2: 22 MMOL/L (ref 22–29)
COLOR: YELLOW
CREAT SERPL-MCNC: 0.9 MG/DL (ref 0.5–1)
EKG ATRIAL RATE: 107 BPM
EKG P AXIS: 4 DEGREES
EKG P-R INTERVAL: 230 MS
EKG Q-T INTERVAL: 320 MS
EKG QRS DURATION: 76 MS
EKG QTC CALCULATION (BAZETT): 427 MS
EKG R AXIS: -4 DEGREES
EKG T AXIS: -3 DEGREES
EKG VENTRICULAR RATE: 107 BPM
EOSINOPHILS ABSOLUTE: 0.5 E9/L (ref 0.05–0.5)
EOSINOPHILS RELATIVE PERCENT: 4.7 % (ref 0–6)
EPITHELIAL CELLS, UA: ABNORMAL /HPF
GFR AFRICAN AMERICAN: >60
GFR NON-AFRICAN AMERICAN: >60 ML/MIN/1.73
GLUCOSE BLD-MCNC: 105 MG/DL (ref 74–99)
GLUCOSE URINE: NEGATIVE MG/DL
HCT VFR BLD CALC: 41.6 % (ref 34–48)
HEMOGLOBIN: 14.2 G/DL (ref 11.5–15.5)
IMMATURE GRANULOCYTES #: 0.05 E9/L
IMMATURE GRANULOCYTES %: 0.5 % (ref 0–5)
INR BLD: 1.2
KETONES, URINE: NEGATIVE MG/DL
LACTIC ACID, SEPSIS: 3.2 MMOL/L (ref 0.5–1.9)
LACTIC ACID: 0.9 MMOL/L (ref 0.5–2.2)
LEUKOCYTE ESTERASE, URINE: ABNORMAL
LYMPHOCYTES ABSOLUTE: 3.55 E9/L (ref 1.5–4)
LYMPHOCYTES RELATIVE PERCENT: 33.6 % (ref 20–42)
MCH RBC QN AUTO: 30.4 PG (ref 26–35)
MCHC RBC AUTO-ENTMCNC: 34.1 % (ref 32–34.5)
MCV RBC AUTO: 89.1 FL (ref 80–99.9)
MONOCYTES ABSOLUTE: 0.98 E9/L (ref 0.1–0.95)
MONOCYTES RELATIVE PERCENT: 9.3 % (ref 2–12)
NEUTROPHILS ABSOLUTE: 5.39 E9/L (ref 1.8–7.3)
NEUTROPHILS RELATIVE PERCENT: 51 % (ref 43–80)
NITRITE, URINE: NEGATIVE
PDW BLD-RTO: 13.4 FL (ref 11.5–15)
PH UA: 6 (ref 5–9)
PLATELET # BLD: 275 E9/L (ref 130–450)
PMV BLD AUTO: 9.9 FL (ref 7–12)
POTASSIUM REFLEX MAGNESIUM: 3.9 MMOL/L (ref 3.5–5)
PRO-BNP: 340 PG/ML (ref 0–450)
PROTEIN UA: NEGATIVE MG/DL
PROTHROMBIN TIME: 13.7 SEC (ref 9.3–12.4)
RBC # BLD: 4.67 E12/L (ref 3.5–5.5)
RBC UA: ABNORMAL /HPF (ref 0–2)
SODIUM BLD-SCNC: 134 MMOL/L (ref 132–146)
SPECIFIC GRAVITY UA: 1.01 (ref 1–1.03)
TOTAL PROTEIN: 7.2 G/DL (ref 6.4–8.3)
TROPONIN, HIGH SENSITIVITY: <6 NG/L (ref 0–9)
UROBILINOGEN, URINE: 0.2 E.U./DL
WBC # BLD: 10.6 E9/L (ref 4.5–11.5)
WBC UA: ABNORMAL /HPF (ref 0–5)

## 2022-04-28 PROCEDURE — 87040 BLOOD CULTURE FOR BACTERIA: CPT

## 2022-04-28 PROCEDURE — 71045 X-RAY EXAM CHEST 1 VIEW: CPT

## 2022-04-28 PROCEDURE — 83880 ASSAY OF NATRIURETIC PEPTIDE: CPT

## 2022-04-28 PROCEDURE — 93005 ELECTROCARDIOGRAM TRACING: CPT | Performed by: STUDENT IN AN ORGANIZED HEALTH CARE EDUCATION/TRAINING PROGRAM

## 2022-04-28 PROCEDURE — 6360000002 HC RX W HCPCS: Performed by: STUDENT IN AN ORGANIZED HEALTH CARE EDUCATION/TRAINING PROGRAM

## 2022-04-28 PROCEDURE — 71275 CT ANGIOGRAPHY CHEST: CPT

## 2022-04-28 PROCEDURE — 84484 ASSAY OF TROPONIN QUANT: CPT

## 2022-04-28 PROCEDURE — 96375 TX/PRO/DX INJ NEW DRUG ADDON: CPT

## 2022-04-28 PROCEDURE — 80053 COMPREHEN METABOLIC PANEL: CPT

## 2022-04-28 PROCEDURE — 99285 EMERGENCY DEPT VISIT HI MDM: CPT

## 2022-04-28 PROCEDURE — 2580000003 HC RX 258: Performed by: STUDENT IN AN ORGANIZED HEALTH CARE EDUCATION/TRAINING PROGRAM

## 2022-04-28 PROCEDURE — 85610 PROTHROMBIN TIME: CPT

## 2022-04-28 PROCEDURE — 81001 URINALYSIS AUTO W/SCOPE: CPT

## 2022-04-28 PROCEDURE — 83605 ASSAY OF LACTIC ACID: CPT

## 2022-04-28 PROCEDURE — 96374 THER/PROPH/DIAG INJ IV PUSH: CPT

## 2022-04-28 PROCEDURE — 93010 ELECTROCARDIOGRAM REPORT: CPT | Performed by: INTERNAL MEDICINE

## 2022-04-28 PROCEDURE — 6360000004 HC RX CONTRAST MEDICATION: Performed by: RADIOLOGY

## 2022-04-28 PROCEDURE — 85025 COMPLETE CBC W/AUTO DIFF WBC: CPT

## 2022-04-28 RX ORDER — OXYCODONE HYDROCHLORIDE 5 MG/1
15 TABLET ORAL ONCE
Status: COMPLETED | OUTPATIENT
Start: 2022-04-28 | End: 2022-04-29

## 2022-04-28 RX ORDER — DIPHENHYDRAMINE HYDROCHLORIDE 50 MG/ML
50 INJECTION INTRAMUSCULAR; INTRAVENOUS ONCE
Status: COMPLETED | OUTPATIENT
Start: 2022-04-28 | End: 2022-04-28

## 2022-04-28 RX ORDER — 0.9 % SODIUM CHLORIDE 0.9 %
1000 INTRAVENOUS SOLUTION INTRAVENOUS ONCE
Status: COMPLETED | OUTPATIENT
Start: 2022-04-28 | End: 2022-04-29

## 2022-04-28 RX ORDER — DIPHENHYDRAMINE HCL 25 MG
50 TABLET ORAL ONCE
Status: DISCONTINUED | OUTPATIENT
Start: 2022-04-28 | End: 2022-04-28

## 2022-04-28 RX ORDER — SODIUM CHLORIDE 9 MG/ML
INJECTION, SOLUTION INTRAVENOUS ONCE
Status: COMPLETED | OUTPATIENT
Start: 2022-04-28 | End: 2022-04-28

## 2022-04-28 RX ADMIN — SODIUM CHLORIDE: 9 INJECTION, SOLUTION INTRAVENOUS at 17:10

## 2022-04-28 RX ADMIN — IOPAMIDOL 75 ML: 755 INJECTION, SOLUTION INTRAVENOUS at 20:04

## 2022-04-28 RX ADMIN — SODIUM CHLORIDE 1000 ML: 9 INJECTION, SOLUTION INTRAVENOUS at 23:33

## 2022-04-28 RX ADMIN — HYDROCORTISONE SODIUM SUCCINATE 200 MG: 100 INJECTION, POWDER, FOR SOLUTION INTRAMUSCULAR; INTRAVENOUS at 17:13

## 2022-04-28 RX ADMIN — DIPHENHYDRAMINE HYDROCHLORIDE 50 MG: 50 INJECTION, SOLUTION INTRAMUSCULAR; INTRAVENOUS at 19:24

## 2022-04-28 ASSESSMENT — ENCOUNTER SYMPTOMS
ABDOMINAL PAIN: 0
TROUBLE SWALLOWING: 0
PHOTOPHOBIA: 0
COUGH: 1
VOMITING: 0
SINUS PAIN: 0
VOICE CHANGE: 0
DIARRHEA: 0
NAUSEA: 0
SHORTNESS OF BREATH: 1

## 2022-04-28 ASSESSMENT — PAIN - FUNCTIONAL ASSESSMENT: PAIN_FUNCTIONAL_ASSESSMENT: 0-10

## 2022-04-28 ASSESSMENT — PAIN DESCRIPTION - LOCATION: LOCATION: CHEST

## 2022-04-28 ASSESSMENT — PAIN SCALES - GENERAL: PAINLEVEL_OUTOF10: 3

## 2022-04-28 NOTE — PROGRESS NOTES
Name: Kayla Swain  : 1940  MRN: [id number]    Date: 2022    Benefits of immediately proceeding with Radiology exam outweigh the risks and therefore the following is being waived:      [] Pregnancy test    [x] Protocol for Iodine allergy    [] MRI questionnaire    [] Adrian Stanley MD

## 2022-04-28 NOTE — ED PROVIDER NOTES
HPI   Patient is a 81-year-old female with past medical history of asthma, hypertension, hypothyroidism, CKD, DVT and recently diagnosed PEs on Eliquis presenting to emergency department due to worsening acute chest pain and shortness of breath. Patient said that her chest pain started abruptly this morning at 11 AM.  Patient notes that she was at an appointment with her pain management doctor and started to have chest pain after leaving. She localizes her pain to her right chest with occasional radiation to the left shoulder. Pain is intermittent and dull. It is moderate in severity and she rates it a 4 out of 10 in intensity. Nothing in particular makes the pain better or worse. She endorses associated shortness of breath. This is worse with exertion. Patient also states that she is mildly dizzy. Of note, patient was hospitalized for 2 days from 4/25-4/27 due to pulmonary embolism. She was on heparin in the hospital and transitioned to Eliquis before discharge. Patient has been taking this as prescribed however has only had a few doses since returning today. Patient otherwise denying other symptoms including fever, chills, nausea, vomiting, palpitations, diaphoresis, headache, lightheadedness, syncope, abdominal pain, urinary symptoms, constipation or diarrhea. Patient lives at home by herself and is frequently visited by his son. Review of Systems   Constitutional: Negative for chills, fatigue and fever. HENT: Negative for congestion, sinus pain, trouble swallowing and voice change. Eyes: Negative for photophobia and visual disturbance. Respiratory: Positive for cough and shortness of breath. Cough, chronic    Shortness of breath with exertion   Cardiovascular: Positive for chest pain. Negative for palpitations. Gastrointestinal: Negative for abdominal pain, diarrhea, nausea and vomiting. Genitourinary: Negative for dysuria, frequency, hematuria and urgency. Musculoskeletal: Negative for arthralgias and myalgias. Skin: Negative for rash and wound. Neurological: Positive for dizziness. Negative for tremors and headaches. Psychiatric/Behavioral: Negative for behavioral problems and confusion. Physical Exam  Constitutional:       General: She is not in acute distress. Appearance: Normal appearance. She is not ill-appearing. Comments: Elderly and frail appearing. HENT:      Head: Normocephalic and atraumatic. Mouth/Throat:      Mouth: Mucous membranes are moist.      Pharynx: Oropharynx is clear. Eyes:      Pupils: Pupils are equal, round, and reactive to light. Cardiovascular:      Rate and Rhythm: Normal rate and regular rhythm. Pulses: Normal pulses. Heart sounds: Normal heart sounds. Pulmonary:      Effort: Pulmonary effort is normal. No respiratory distress. Breath sounds: Decreased breath sounds present. No wheezing, rhonchi or rales. Comments: Mildly diminished breath sounds diffusely. No crackles, wheezes, rales or rhonchi. Abdominal:      Palpations: Abdomen is soft. Tenderness: There is no abdominal tenderness. There is no guarding or rebound. Musculoskeletal:         General: Normal range of motion. Cervical back: Normal range of motion and neck supple. Skin:     General: Skin is warm and dry. Capillary Refill: Capillary refill takes less than 2 seconds. Neurological:      General: No focal deficit present. Mental Status: She is alert and oriented to person, place, and time. Cranial Nerves: No cranial nerve deficit. Coordination: Coordination normal.   Psychiatric:         Mood and Affect: Mood normal.         Behavior: Behavior normal.        Procedures   EKG: This EKG is signed and interpreted by me. Sinus tachycardia with ventricular rate on 107 bpm.  First-degree AV block with prolonged [place] interval of 230 MS. QTc not prolonged. No evidence of acute STEMI. Nonspecific ST and T wave abnormalities. Stable and improved compared to previous EKG on 4/25/2022. MDM   Patient is a 26-year-old female with past medical history of asthma, hypertension, hypothyroidism, CKD, DVT and recently diagnosed PEs on Eliquis presenting to the emergency department due to chest pain and shortness of breath. Of note, patient was admitted to the hospital from 4/25-4/27 for management of pulmonary embolism. He was discharged home on Eliquis. She has been taking this as prescribed but is returning to the emergency department today due to chest pain and shortness of breath. Work-up in the emergency department generally unremarkable. No clinically significant lab abnormalities. Repeat CTA obtained and noting stable PEs with no significant changes compared to imaging studies a few days ago when she was diagnosed with bilateral PEs. Patient remained hemodynamically stable in the emergency department. Slightly tachycardic intermittently however patient was never hypoxic. She is on the proper medication for anticoagulation. Plan to admit the patient for further evaluation and monitoring. Spoke with April who accepted the patient for admission. Patient agreeable with this plan.      ED Course as of 04/29/22 1031   Thu Apr 28, 2022   2345 Observation, Intermediate tele - accepted by april [PP]      ED Course User Index  [PP] Dominique Acsota, DO       --------------------------------------------- PAST HISTORY ---------------------------------------------  Past Medical History:  has a past medical history of Anxiety and depression, Arthritis, Asthma, Chronic back pain, Chronic kidney disease (CKD), stage II (mild), Chronic osteomyelitis of lumbar spine ([address] Foot pain, HTN (hypertension), benign, Hx of blood clots, Hx of migraines, Hyperlipidemia, Hypothyroidism, Hypothyroidism, Neuropathy of leg, Other postoperative infection, Radiculopathy, lumbar region, Seizures ([address] Sleeping difficulties, Spinal stenosis of lumbar region, Synovial cyst of lumbar spine, Thrombocytopenia ([facility]), Tremors of nervous system, Vitamin D deficiency, and Wears dentures. Past Surgical History:  has a past surgical history that includes lumbar laminectomy (); cervical fusion (?); joint replacement (); Spine surgery ( & ); Spine surgery (); Spine surgery (); Tonsillectomy; Colonoscopy;  section; Cholecystectomy (); eye surgery (Bilateral, ); back surgery; Spine surgery (); Knee Arthroplasty (Right, 2017); pr egd transoral biopsy single/multiple (N/A, 6/15/2018); and pr colonoscopy flx dx w/collj spec when pfrmd (N/A, 6/15/2018). Social History:  reports that she has never smoked. She has never used smokeless tobacco. She reports that she does not drink alcohol and does not use drugs. Family History: family history includes Cirrhosis in her brother; Heart Attack in her father; Other in her mother. The patients home medications have been reviewed.     Allergies: Albuterol, Iodine, Vistaril [hydroxyzine hcl], Gabapentin, Lyrica [pregabalin], and Skelaxin [metaxalone]    -------------------------------------------------- RESULTS -------------------------------------------------    LABS:  Results for orders placed or performed during the hospital encounter of 22   CBC with Auto Differential   Result Value Ref Range    WBC 10.6 4.5 - 11.5 E9/L    RBC 4.67 3.50 - 5.50 E12/L    Hemoglobin 14.2 11.5 - 15.5 g/dL    Hematocrit 41.6 34.0 - 48.0 %    MCV 89.1 80.0 - 99.9 fL    MCH 30.4 26.0 - 35.0 pg    MCHC 34.1 32.0 - 34.5 %    RDW 13.4 11.5 - 15.0 fL    Platelets 108 125 - 480 E9/L    MPV 9.9 7.0 - 12.0 fL    Neutrophils % 51.0 43.0 - 80.0 %    Immature Granulocytes % 0.5 0.0 - 5.0 %    Lymphocytes % 33.6 20.0 - 42.0 %    Monocytes % 9.3 2.0 - 12.0 %    Eosinophils % 4.7 0.0 - 6.0 %    Basophils % 0.9 0.0 - 2.0 %    Neutrophils Absolute 5.39 1.80 - 7.30 E9/L    Immature Granulocytes # 0.05 E9/L    Lymphocytes Absolute 3.55 1.50 - 4.00 E9/L    Monocytes Absolute 0.98 (H) 0.10 - 0.95 E9/L    Eosinophils Absolute 0.50 0.05 - 0.50 E9/L    Basophils Absolute 0.10 0.00 - 0.20 E9/L   Comprehensive Metabolic Panel w/ Reflex to MG   Result Value Ref Range    Sodium 134 132 - 146 mmol/L    Potassium reflex Magnesium 3.9 3.5 - 5.0 mmol/L    Chloride 101 98 - 107 mmol/L    CO2 22 22 - 29 mmol/L    Anion Gap 11 7 - 16 mmol/L    Glucose 105 (H) 74 - 99 mg/dL    BUN 28 (H) 6 - 23 mg/dL    CREATININE 0.9 0.5 - 1.0 mg/dL    GFR Non-African American >60 >=60 mL/min/1.73    GFR African American >60     Calcium 9.2 8.6 - 10.2 mg/dL    Total Protein 7.2 6.4 - 8.3 g/dL    Albumin 3.6 3.5 - 5.2 g/dL    Total Bilirubin 0.2 0.0 - 1.2 mg/dL    Alkaline Phosphatase 208 (H) 35 - 104 U/L    ALT 37 (H) 0 - 32 U/L    AST 28 0 - 31 U/L   Troponin   Result Value Ref Range    Troponin, High Sensitivity <6 0 - 9 ng/L   Brain Natriuretic Peptide   Result Value Ref Range    Pro- 0 - 450 pg/mL   Lactic Acid   Result Value Ref Range    Lactic Acid 0.9 0.5 - 2.2 mmol/L   Protime-INR   Result Value Ref Range    Protime 13.7 (H) 9.3 - 12.4 sec    INR 1.2    Urinalysis with Microscopic   Result Value Ref Range    Color, UA Yellow Straw/Yellow    Clarity, UA Clear Clear    Glucose, Ur Negative Negative mg/dL    Bilirubin Urine Negative Negative    Ketones, Urine Negative Negative mg/dL    Specific Gravity, UA 1.010 1.005 - 1.030    Blood, Urine Negative Negative    pH, UA 6.0 5.0 - 9.0    Protein, UA Negative Negative mg/dL    Urobilinogen, Urine 0.2 <2.0 E.U./dL    Nitrite, Urine Negative Negative    Leukocyte Esterase, Urine TRACE (A) Negative    WBC, UA 1-3 0 - 5 /HPF    RBC, UA NONE 0 - 2 /HPF    Epithelial Cells, UA FEW /HPF    Bacteria, UA RARE (A) None Seen /HPF   Lactate, Sepsis   Result Value Ref Range    Lactic Acid, Sepsis 3.2 (H) 0.5 - 1.9 mmol/L   Basic Metabolic Panel w/ Reflex to MG   Result Value Ref Range    Sodium 139 132 - 146 mmol/L    Potassium reflex Magnesium 3.8 3.5 - 5.0 mmol/L    Chloride 107 98 - 107 mmol/L    CO2 21 (L) 22 - 29 mmol/L    Anion Gap 11 7 - 16 mmol/L    Glucose 96 74 - 99 mg/dL    BUN 22 6 - 23 mg/dL    CREATININE 0.9 0.5 - 1.0 mg/dL    GFR Non-African American >60 >=60 mL/min/1.73    GFR African American >60     Calcium 8.1 (L) 8.6 - 10.2 mg/dL   CBC with Auto Differential   Result Value Ref Range    WBC 10.1 4.5 - 11.5 E9/L    RBC 3.97 3.50 - 5.50 E12/L    Hemoglobin 12.0 11.5 - 15.5 g/dL    Hematocrit 36.3 34.0 - 48.0 %    MCV 91.4 80.0 - 99.9 fL    MCH 30.2 26.0 - 35.0 pg    MCHC 33.1 32.0 - 34.5 %    RDW 13.5 11.5 - 15.0 fL    Platelets 792 010 - 594 E9/L    MPV 10.3 7.0 - 12.0 fL    Neutrophils % 54.5 43.0 - 80.0 %    Immature Granulocytes % 0.5 0.0 - 5.0 %    Lymphocytes % 30.8 20.0 - 42.0 %    Monocytes % 12.1 (H) 2.0 - 12.0 %    Eosinophils % 1.3 0.0 - 6.0 %    Basophils % 0.8 0.0 - 2.0 %    Neutrophils Absolute 5.52 1.80 - 7.30 E9/L    Immature Granulocytes # 0.05 E9/L    Lymphocytes Absolute 3.11 1.50 - 4.00 E9/L    Monocytes Absolute 1.22 (H) 0.10 - 0.95 E9/L    Eosinophils Absolute 0.13 0.05 - 0.50 E9/L    Basophils Absolute 0.08 0.00 - 0.20 E9/L   Troponin   Result Value Ref Range    Troponin, High Sensitivity <6 0 - 9 ng/L   EKG 12 Lead   Result Value Ref Range    Ventricular Rate 107 BPM    Atrial Rate 107 BPM    P-R Interval 230 ms    QRS Duration 76 ms    Q-T Interval 320 ms    QTc Calculation (Bazett) 427 ms    P Axis 4 degrees    R Axis -4 degrees    T Axis -3 degrees   EKG 12 Lead   Result Value Ref Range    Ventricular Rate 115 BPM    Atrial Rate 115 BPM    QRS Duration 88 ms    Q-T Interval 394 ms    QTc Calculation (Bazett) 545 ms    R Axis 4 degrees    T Axis -2 degrees       RADIOLOGY:  CTA CHEST W CONTRAST   Final Result   No significant change in overall extent of bilateral lower lobe and left   upper lobe pulmonary emboli, but there has been migration distally into the   segmental and subsegmental branches. No evidence of significant right heart   strain or pulmonary infarct. XR CHEST PORTABLE   Final Result   No acute process. ------------------------- NURSING NOTES AND VITALS REVIEWED ---------------------------  Date / Time Roomed:  4/28/2022  3:35 PM  ED Bed Assignment:  [id number]    The nursing notes within the ED encounter and vital signs as below have been reviewed. Patient Vitals for the past 24 hrs:   BP Temp Temp src Pulse Resp SpO2 Height Weight   04/29/22 0745 (!) 112/55 97.6 °F (36.4 °C) Oral 102 18 95 % -- --   04/29/22 0230 (!) 142/89 97.9 °F (36.6 °C) Oral 105 18 97 % 5' (1.524 m) 176 lb 2.4 oz (79.9 kg)   04/29/22 0030 99/74 98.1 °F (36.7 °C) Oral 92 18 96 % -- --   04/28/22 2015 105/70 -- -- 111 18 95 % -- --   04/28/22 1743 107/64 -- -- 101 18 98 % -- --   04/28/22 1717 (!) 97/49 97.9 °F (36.6 °C) -- 71 18 97 % -- --   04/28/22 1716 -- -- -- -- -- -- 5' (1.524 m) 177 lb (80.3 kg)       Oxygen Saturation Interpretation: Normal    ------------------------------------------ PROGRESS NOTES ------------------------------------------  Counseling:  I have spoken with the patient and discussed todays results, in addition to providing specific details for the plan of care and counseling regarding the diagnosis and prognosis. Their questions are answered at this time and they are agreeable with the plan of admission.    --------------------------------- ADDITIONAL PROVIDER NOTES ---------------------------------  Consultations:  Time: 2345. Spoke with April. Discussed case. They will admit the patient under Dr. Tracey Barnett.   This patient's ED course included: a personal history and physicial examination, re-evaluation prior to disposition, multiple bedside re-evaluations, IV medications, cardiac monitoring and continuous pulse oximetry    This patient has remained hemodynamically stable during their ED course. Diagnosis:  1. Bilateral pulmonary embolism (HCC)        Disposition:  Patient's disposition: Admit to telemetry, Observation  Patient's condition is stable.            Rojelio Lara DO  Resident  04/29/22 3077

## 2022-04-29 LAB
ANION GAP SERPL CALCULATED.3IONS-SCNC: 11 MMOL/L (ref 7–16)
BASOPHILS ABSOLUTE: 0.08 E9/L (ref 0–0.2)
BASOPHILS RELATIVE PERCENT: 0.8 % (ref 0–2)
BUN BLDV-MCNC: 22 MG/DL (ref 6–23)
CALCIUM SERPL-MCNC: 8.1 MG/DL (ref 8.6–10.2)
CHLORIDE BLD-SCNC: 107 MMOL/L (ref 98–107)
CO2: 21 MMOL/L (ref 22–29)
CREAT SERPL-MCNC: 0.9 MG/DL (ref 0.5–1)
EKG ATRIAL RATE: 115 BPM
EKG Q-T INTERVAL: 394 MS
EKG QRS DURATION: 88 MS
EKG QTC CALCULATION (BAZETT): 545 MS
EKG R AXIS: 4 DEGREES
EKG T AXIS: -2 DEGREES
EKG VENTRICULAR RATE: 115 BPM
EOSINOPHILS ABSOLUTE: 0.13 E9/L (ref 0.05–0.5)
EOSINOPHILS RELATIVE PERCENT: 1.3 % (ref 0–6)
GFR AFRICAN AMERICAN: >60
GFR NON-AFRICAN AMERICAN: >60 ML/MIN/1.73
GLUCOSE BLD-MCNC: 96 MG/DL (ref 74–99)
HCT VFR BLD CALC: 36.3 % (ref 34–48)
HEMOGLOBIN: 12 G/DL (ref 11.5–15.5)
IMMATURE GRANULOCYTES #: 0.05 E9/L
IMMATURE GRANULOCYTES %: 0.5 % (ref 0–5)
LYMPHOCYTES ABSOLUTE: 3.11 E9/L (ref 1.5–4)
LYMPHOCYTES RELATIVE PERCENT: 30.8 % (ref 20–42)
MCH RBC QN AUTO: 30.2 PG (ref 26–35)
MCHC RBC AUTO-ENTMCNC: 33.1 % (ref 32–34.5)
MCV RBC AUTO: 91.4 FL (ref 80–99.9)
MONOCYTES ABSOLUTE: 1.22 E9/L (ref 0.1–0.95)
MONOCYTES RELATIVE PERCENT: 12.1 % (ref 2–12)
NEUTROPHILS ABSOLUTE: 5.52 E9/L (ref 1.8–7.3)
NEUTROPHILS RELATIVE PERCENT: 54.5 % (ref 43–80)
PDW BLD-RTO: 13.5 FL (ref 11.5–15)
PLATELET # BLD: 227 E9/L (ref 130–450)
PMV BLD AUTO: 10.3 FL (ref 7–12)
POTASSIUM REFLEX MAGNESIUM: 3.8 MMOL/L (ref 3.5–5)
RBC # BLD: 3.97 E12/L (ref 3.5–5.5)
SODIUM BLD-SCNC: 139 MMOL/L (ref 132–146)
TROPONIN, HIGH SENSITIVITY: <6 NG/L (ref 0–9)
TSH SERPL DL<=0.05 MIU/L-ACNC: 2.24 UIU/ML (ref 0.27–4.2)
WBC # BLD: 10.1 E9/L (ref 4.5–11.5)

## 2022-04-29 PROCEDURE — 6360000002 HC RX W HCPCS: Performed by: NURSE PRACTITIONER

## 2022-04-29 PROCEDURE — 6370000000 HC RX 637 (ALT 250 FOR IP): Performed by: NURSE PRACTITIONER

## 2022-04-29 PROCEDURE — 87088 URINE BACTERIA CULTURE: CPT

## 2022-04-29 PROCEDURE — 97161 PT EVAL LOW COMPLEX 20 MIN: CPT

## 2022-04-29 PROCEDURE — 80048 BASIC METABOLIC PNL TOTAL CA: CPT

## 2022-04-29 PROCEDURE — 84484 ASSAY OF TROPONIN QUANT: CPT

## 2022-04-29 PROCEDURE — 93010 ELECTROCARDIOGRAM REPORT: CPT | Performed by: INTERNAL MEDICINE

## 2022-04-29 PROCEDURE — 84443 ASSAY THYROID STIM HORMONE: CPT

## 2022-04-29 PROCEDURE — 93005 ELECTROCARDIOGRAM TRACING: CPT | Performed by: INTERNAL MEDICINE

## 2022-04-29 PROCEDURE — 36415 COLL VENOUS BLD VENIPUNCTURE: CPT

## 2022-04-29 PROCEDURE — G0378 HOSPITAL OBSERVATION PER HR: HCPCS

## 2022-04-29 PROCEDURE — 94640 AIRWAY INHALATION TREATMENT: CPT

## 2022-04-29 PROCEDURE — 2580000003 HC RX 258: Performed by: NURSE PRACTITIONER

## 2022-04-29 PROCEDURE — 85025 COMPLETE CBC W/AUTO DIFF WBC: CPT

## 2022-04-29 PROCEDURE — 93005 ELECTROCARDIOGRAM TRACING: CPT | Performed by: FAMILY MEDICINE

## 2022-04-29 PROCEDURE — 6370000000 HC RX 637 (ALT 250 FOR IP): Performed by: STUDENT IN AN ORGANIZED HEALTH CARE EDUCATION/TRAINING PROGRAM

## 2022-04-29 PROCEDURE — 99222 1ST HOSP IP/OBS MODERATE 55: CPT | Performed by: INTERNAL MEDICINE

## 2022-04-29 RX ORDER — ATORVASTATIN CALCIUM 10 MG/1
10 TABLET, FILM COATED ORAL NIGHTLY
Status: DISCONTINUED | OUTPATIENT
Start: 2022-04-29 | End: 2022-05-02 | Stop reason: HOSPADM

## 2022-04-29 RX ORDER — BUDESONIDE 0.5 MG/2ML
0.5 INHALANT ORAL 2 TIMES DAILY
Status: DISCONTINUED | OUTPATIENT
Start: 2022-04-29 | End: 2022-05-02 | Stop reason: HOSPADM

## 2022-04-29 RX ORDER — LEVOTHYROXINE SODIUM 0.03 MG/1
25 TABLET ORAL DAILY
Status: DISCONTINUED | OUTPATIENT
Start: 2022-04-29 | End: 2022-05-02 | Stop reason: HOSPADM

## 2022-04-29 RX ORDER — LOSARTAN POTASSIUM 25 MG/1
25 TABLET ORAL DAILY
Status: DISCONTINUED | OUTPATIENT
Start: 2022-04-29 | End: 2022-05-02 | Stop reason: HOSPADM

## 2022-04-29 RX ORDER — PROPRANOLOL HYDROCHLORIDE 20 MG/1
20 TABLET ORAL 2 TIMES DAILY
Status: DISCONTINUED | OUTPATIENT
Start: 2022-04-29 | End: 2022-05-02 | Stop reason: HOSPADM

## 2022-04-29 RX ORDER — POTASSIUM CHLORIDE 20 MEQ/1
20 TABLET, EXTENDED RELEASE ORAL 2 TIMES DAILY
Status: DISCONTINUED | OUTPATIENT
Start: 2022-04-29 | End: 2022-05-02 | Stop reason: HOSPADM

## 2022-04-29 RX ORDER — PHENYTOIN SODIUM 100 MG/1
100 CAPSULE, EXTENDED RELEASE ORAL 2 TIMES DAILY
Status: DISCONTINUED | OUTPATIENT
Start: 2022-04-29 | End: 2022-05-02 | Stop reason: HOSPADM

## 2022-04-29 RX ORDER — OXYCODONE HYDROCHLORIDE 15 MG/1
15 TABLET ORAL EVERY 6 HOURS PRN
Status: DISCONTINUED | OUTPATIENT
Start: 2022-04-29 | End: 2022-05-02 | Stop reason: HOSPADM

## 2022-04-29 RX ORDER — ONDANSETRON 2 MG/ML
4 INJECTION INTRAMUSCULAR; INTRAVENOUS EVERY 6 HOURS PRN
Status: DISCONTINUED | OUTPATIENT
Start: 2022-04-29 | End: 2022-05-02 | Stop reason: HOSPADM

## 2022-04-29 RX ORDER — OMEPRAZOLE 20 MG/1
40 CAPSULE, DELAYED RELEASE ORAL DAILY
Status: DISCONTINUED | OUTPATIENT
Start: 2022-04-29 | End: 2022-04-29 | Stop reason: CLARIF

## 2022-04-29 RX ORDER — PANTOPRAZOLE SODIUM 40 MG/1
40 TABLET, DELAYED RELEASE ORAL
Status: DISCONTINUED | OUTPATIENT
Start: 2022-04-29 | End: 2022-05-02 | Stop reason: HOSPADM

## 2022-04-29 RX ORDER — SODIUM CHLORIDE 0.9 % (FLUSH) 0.9 %
5-40 SYRINGE (ML) INJECTION EVERY 12 HOURS SCHEDULED
Status: DISCONTINUED | OUTPATIENT
Start: 2022-04-29 | End: 2022-05-02 | Stop reason: HOSPADM

## 2022-04-29 RX ORDER — SODIUM CHLORIDE 0.9 % (FLUSH) 0.9 %
5-40 SYRINGE (ML) INJECTION PRN
Status: DISCONTINUED | OUTPATIENT
Start: 2022-04-29 | End: 2022-05-02 | Stop reason: HOSPADM

## 2022-04-29 RX ORDER — SODIUM CHLORIDE 9 MG/ML
INJECTION, SOLUTION INTRAVENOUS PRN
Status: DISCONTINUED | OUTPATIENT
Start: 2022-04-29 | End: 2022-05-02 | Stop reason: HOSPADM

## 2022-04-29 RX ORDER — ARFORMOTEROL TARTRATE 15 UG/2ML
15 SOLUTION RESPIRATORY (INHALATION) 2 TIMES DAILY
Status: DISCONTINUED | OUTPATIENT
Start: 2022-04-29 | End: 2022-05-02 | Stop reason: HOSPADM

## 2022-04-29 RX ORDER — HYDROCHLOROTHIAZIDE 25 MG/1
25 TABLET ORAL DAILY
Status: DISCONTINUED | OUTPATIENT
Start: 2022-04-29 | End: 2022-05-02 | Stop reason: HOSPADM

## 2022-04-29 RX ORDER — FUROSEMIDE 40 MG/1
40 TABLET ORAL DAILY
Status: DISCONTINUED | OUTPATIENT
Start: 2022-04-29 | End: 2022-05-02 | Stop reason: HOSPADM

## 2022-04-29 RX ORDER — ONDANSETRON 4 MG/1
4 TABLET, ORALLY DISINTEGRATING ORAL EVERY 8 HOURS PRN
Status: DISCONTINUED | OUTPATIENT
Start: 2022-04-29 | End: 2022-05-02 | Stop reason: HOSPADM

## 2022-04-29 RX ADMIN — PROPRANOLOL HYDROCHLORIDE 30 MG: 20 TABLET ORAL at 13:16

## 2022-04-29 RX ADMIN — POTASSIUM CHLORIDE 20 MEQ: 1500 TABLET, EXTENDED RELEASE ORAL at 21:09

## 2022-04-29 RX ADMIN — PANTOPRAZOLE SODIUM 40 MG: 40 TABLET, DELAYED RELEASE ORAL at 06:13

## 2022-04-29 RX ADMIN — ATORVASTATIN CALCIUM 10 MG: 10 TABLET, FILM COATED ORAL at 21:09

## 2022-04-29 RX ADMIN — Medication 10 ML: at 21:09

## 2022-04-29 RX ADMIN — BUDESONIDE 500 MCG: 0.5 SUSPENSION RESPIRATORY (INHALATION) at 09:04

## 2022-04-29 RX ADMIN — BUDESONIDE 500 MCG: 0.5 SUSPENSION RESPIRATORY (INHALATION) at 18:17

## 2022-04-29 RX ADMIN — HYDROCHLOROTHIAZIDE 25 MG: 25 TABLET ORAL at 10:00

## 2022-04-29 RX ADMIN — APIXABAN 10 MG: 5 TABLET, FILM COATED ORAL at 10:00

## 2022-04-29 RX ADMIN — PROPRANOLOL HYDROCHLORIDE 20 MG: 20 TABLET ORAL at 09:59

## 2022-04-29 RX ADMIN — LEVOTHYROXINE SODIUM 25 MCG: 25 TABLET ORAL at 06:13

## 2022-04-29 RX ADMIN — FUROSEMIDE 40 MG: 40 TABLET ORAL at 10:00

## 2022-04-29 RX ADMIN — APIXABAN 10 MG: 5 TABLET, FILM COATED ORAL at 21:09

## 2022-04-29 RX ADMIN — PHENYTOIN SODIUM 100 MG: 100 CAPSULE ORAL at 21:09

## 2022-04-29 RX ADMIN — OXYCODONE 15 MG: 15 TABLET ORAL at 21:50

## 2022-04-29 RX ADMIN — LOSARTAN POTASSIUM 25 MG: 25 TABLET, FILM COATED ORAL at 10:00

## 2022-04-29 RX ADMIN — POTASSIUM CHLORIDE 20 MEQ: 1500 TABLET, EXTENDED RELEASE ORAL at 10:00

## 2022-04-29 RX ADMIN — Medication 10 ML: at 10:06

## 2022-04-29 RX ADMIN — OXYCODONE 15 MG: 5 TABLET ORAL at 01:26

## 2022-04-29 RX ADMIN — PROPRANOLOL HYDROCHLORIDE 20 MG: 20 TABLET ORAL at 21:09

## 2022-04-29 RX ADMIN — PHENYTOIN SODIUM 100 MG: 100 CAPSULE ORAL at 10:00

## 2022-04-29 RX ADMIN — ARFORMOTEROL TARTRATE 15 MCG: 15 SOLUTION RESPIRATORY (INHALATION) at 18:17

## 2022-04-29 RX ADMIN — ARFORMOTEROL TARTRATE 15 MCG: 15 SOLUTION RESPIRATORY (INHALATION) at 09:04

## 2022-04-29 ASSESSMENT — PAIN SCALES - WONG BAKER: WONGBAKER_NUMERICALRESPONSE: 0

## 2022-04-29 ASSESSMENT — PAIN SCALES - GENERAL
PAINLEVEL_OUTOF10: 0
PAINLEVEL_OUTOF10: 0
PAINLEVEL_OUTOF10: 6
PAINLEVEL_OUTOF10: 8
PAINLEVEL_OUTOF10: 0

## 2022-04-29 ASSESSMENT — PAIN DESCRIPTION - DESCRIPTORS: DESCRIPTORS: ACHING;DISCOMFORT

## 2022-04-29 ASSESSMENT — PAIN DESCRIPTION - LOCATION: LOCATION: BACK

## 2022-04-29 ASSESSMENT — PAIN DESCRIPTION - ORIENTATION: ORIENTATION: LOWER

## 2022-04-29 NOTE — CONSULTS
INPATIENT CARDIOLOGY CONSULT    Name: Brice [facility]    Age: 81 y.o. Date of Admission: 4/28/2022  3:35 PM    Date of Service: 4/29/2022    Reason for Consultation: Atypical chest pain and irregular heartbeat    Chief Complaint   Patient presents with    Chest Pain    Shortness of Breath        Referring Physician: Jo-Ann Barba MD    History of Present Illness: 20-year-old and [place]-speaking with history of anxiety/depression, osteoarthritis, asthma, chronic back pain, chronic kidney disease, chronic osteomyelitis of the lumbar spine, hypertension, hyperlipidemia, hypothyroidism, and prior DVT/PE discharged from the hospital on April 27, 2022 with PE. He presented to the emergency room again on April 28, 2022 with chest pains and shortness of breath and again noted to have bilateral pulmonary embolism without RV strain. Cardiology consulted for atypical chest pain and irregular heartbeat          Past Medical History:  Past Medical History:   Diagnosis Date    Anxiety and depression 7/13/2016    Arthritis     osteo    Asthma     has chronic couch    Chronic back pain     Chronic kidney disease (CKD), stage II (mild) 04/19/2012    Creatinine around 1.3 to 1.4 in April 2012 secondary to IV antibiotics following back surgery.  Chronic osteomyelitis of lumbar spine ([facility]     note on chart from Dr. Armaan Gongora dated 8/17/2017    Foot pain     after back surgery / chronic    HTN (hypertension), benign 1/10/2015    Hx of blood clots     2011 / DVT, PE    Hx of migraines     Hyperlipidemia     Hypothyroidism     Hypothyroidism 7/13/2016    Neuropathy of leg     bilateral    Other postoperative infection 08/2011    Was on Vancomycin following the surgery for the removal of lumbar cysts.  Patient on antibiotics for life     Radiculopathy, lumbar region 01/16/2012    Seizures ([address]     last seizure 11/2016 / gran mal    Sleeping difficulties     takes [place]    Spinal stenosis of lumbar region  Synovial cyst of lumbar spine 2011    Thrombocytopenia ([facility] [address] 2016    follows only with PCP    Tremors of nervous system     hands    Vitamin D deficiency     Wears dentures     upper       Review of Systems:   Cardiac: As per HPI  General: Denies fever or chills  Pulmonary: As per HPI  HEENT: Denies runny nose  GI: No complaints  : No complaints  Endocrine: Denies night sweats  Musculoskeletal: No complaints  Skin: Dry skin  Neuro: No complaints  Psych: Denies depression    Past Surgical History:  Past Surgical History:   Procedure Laterality Date    BACK SURGERY      multiple    CERVICAL FUSION  ?   SECTION      CHOLECYSTECTOMY      lap    COLONOSCOPY      EYE SURGERY Bilateral     cataract extraction    JOINT REPLACEMENT  2010    left    KNEE ARTHROPLASTY Right 2017    Total right knee arthroplasty    LUMBAR LAMINECTOMY  1985    AL COLONOSCOPY FLX DX W/COLLJ SPEC WHEN PFRMD N/A 6/15/2018    COLONOSCOPY DIAGNOSTIC performed by Raymundo Boykin MD at [facility] EGD TRANSORAL BIOPSY SINGLE/MULTIPLE N/A 6/15/2018    EGD ESOPHAGOGASTRODUODENOSCOPY performed by Raymundo Boykni MD at [address]      Neck    SPINE SURGERY  2012    synovial cyst    [address]   2012    rebuilt talibone    TONSILLECTOMY         Family History:  Family History   Problem Relation Age of Onset    Other Mother         cerebral hemorrhage, psychiatric problems    Heart Attack Father     Cirrhosis Brother        Social History:  Social History     Socioeconomic History    Marital status:       Spouse name: Not on file    Number of children: Not on file    Years of education: Not on file    Highest education level: Not on file   Occupational History    Not on file   Tobacco Use    Smoking status: Never Smoker    Smokeless tobacco: Never Used   Vaping Use    Vaping Use: Never used   Substance and Sexual Activity    Alcohol use: No     Comment: rare    Drug use: No    Sexual activity: Not Currently   Other Topics Concern    Not on file   Social History Narrative    Not on file     Social Determinants of Health     Financial Resource Strain:     Difficulty of Paying Living Expenses: Not on file   Food Insecurity:     Worried About Running Out of Food in the Last Year: Not on file    Fredis of Food in the Last Year: Not on file   Transportation Needs:     Lack of Transportation (Medical): Not on file    Lack of Transportation (Non-Medical): Not on file   Physical Activity:     Days of Exercise per Week: Not on file    Minutes of Exercise per Session: Not on file   Stress:     Feeling of Stress : Not on file   Social Connections:     Frequency of Communication with Friends and Family: Not on file    Frequency of Social Gatherings with Friends and Family: Not on file    Attends Sikhism Services: Not on file    Active Member of [address] [place] [address] or Organizations: Not on file    Attends Club or Organization Meetings: Not on file    Marital Status: Not on file   Intimate Partner Violence:     Fear of Current or Ex-Partner: Not on file    Emotionally Abused: Not on file    Physically Abused: Not on file    Sexually Abused: Not on file   Housing Stability:     Unable to Pay for Housing in the Last Year: Not on file    Number of [place] in the Last Year: Not on file    Unstable Housing in the Last Year: Not on file       Allergies: Allergies   Allergen Reactions    Albuterol Hives    Iodine Shortness Of Breath    Vistaril [Hydroxyzine Hcl] Shortness Of Breath    Gabapentin Other (See Comments)     lethargic    Lyrica [Pregabalin]      lethargic    Skelaxin [Metaxalone] Other (See Comments)     Patient becomes sedated after taking Skelaxin       Home Medications:  Prior to Admission medications    Medication Sig Start Date End Date Taking?  Authorizing Provider   apixaban (ELIQUIS) 5 MG TABS tablet Take 2 tablets by mouth 2 times daily for 12 doses 4/27/22 5/3/22  [facility] GENARO Chandra - CNP   apixaban (ELIQUIS) 5 MG TABS tablet Take 1 tablet by mouth 2 times daily 5/3/22   [employer] GENARO Chandra - CNP   losartan (COZAAR) 25 MG tablet Take 1 tablet by mouth daily 4/28/22   [employer] GENARO Chandra - CNP   melatonin 3 MG TABS tablet Take 0.5 tablets by mouth nightly as needed (sleep) 4/27/22   [facility] GENARO Chandra - CNP   furosemide (LASIX) 40 MG tablet Take 1 tablet by mouth daily 4/13/22   Historical Provider, MD   omeprazole (PRILOSEC) 40 MG delayed release capsule Take 1 capsule by mouth daily 4/13/22   Historical Provider, MD   propranolol (INDERAL) 20 MG tablet 1 in am and evening and 1.5 in afternoon  Patient taking differently: Take 20 mg by mouth 3 times daily 20 mg in am and evening and 30 mg in afternoon 2/14/22   GENARO Arellano - CNS   phenytoin (DILANTIN) 100 MG ER capsule Take 1 capsule by mouth 2 times daily 2/14/22   GENARO Arellano - CNS   fluticasone-salmeterol (ADVAIR DISKUS) 250-50 MCG/DOSE AEPB Inhale 1 puff into the lungs every 12 hours 7/3/19   Tayla England MD   potassium chloride (KLOR-CON M) 20 MEQ extended release tablet Take 20 mEq by mouth 2 times daily     Historical Provider, MD   doxepin (SINEQUAN) 10 MG capsule Take 6 mg by mouth nightly    Historical Provider, MD   oxyCODONE (OXY-IR) 15 MG immediate release tablet Take 15 mg by mouth every 6 hours as needed for Pain.     Historical Provider, MD   topiramate (TOPAMAX) 25 MG tablet  9/26/18   Historical Provider, MD   diazepam (VALIUM) 10 MG tablet Take 1 tablet by mouth every 12 hours as needed for Anxiety  Patient not taking: Reported on 4/25/2022 9/10/17   Vj Alvarez MD   hydrochlorothiazide (HYDRODIURIL) 25 MG tablet Take 25 mg by mouth daily  5/8/17   Historical Provider, MD   atorvastatin (LIPITOR) 10 MG tablet Take 1 tablet by mouth nightly 12/11/16   Tayla England MD   levothyroxine (SYNTHROID) 25 MCG tablet Take 25 mcg by mouth daily    Historical Provider, MD       Current Medications:  Current Facility-Administered Medications   Medication Dose Route Frequency Provider Last Rate Last Admin    sodium chloride flush 0.9 % injection 5-40 mL  5-40 mL IntraVENous 2 times per day April JAMIR FritzN - CNP   10 mL at 04/29/22 1006    sodium chloride flush 0.9 % injection 5-40 mL  5-40 mL IntraVENous PRN April Lam, APRN - CNP        0.9 % sodium chloride infusion   IntraVENous PRN April Lam, APRN - CNP        ondansetron (ZOFRAN-ODT) disintegrating tablet 4 mg  4 mg Oral Q8H PRN April GENARO Fritz - DULCE MARIA        Or    ondansetron [facility]) injection 4 mg  4 mg IntraVENous Q6H PRN April Lam, APRN - CNP        bisacodyl (DULCOLAX) EC tablet 5 mg  5 mg Oral Daily PRN April Lam, APRN - CNP        apixaban (ELIQUIS) tablet 10 mg  10 mg Oral BID April Storey-Jonah, APRN - CNP   10 mg at 04/29/22 1000    atorvastatin (LIPITOR) tablet 10 mg  10 mg Oral Nightly April Lam APRN - CNP        furosemide (LASIX) tablet 40 mg  40 mg Oral Daily April Storey-Jonah, APRN - CNP   40 mg at 04/29/22 1000    hydroCHLOROthiazide (HYDRODIURIL) tablet 25 mg  25 mg Oral Daily April KirtiBrendan, APRN - CNP   25 mg at 04/29/22 1000    levothyroxine (SYNTHROID) tablet 25 mcg  25 mcg Oral Daily April StoreyPreston, APRN - CNP   25 mcg at 04/29/22 5947    losartan (COZAAR) tablet 25 mg  25 mg Oral Daily April StoreyPreston, APRN - CNP   25 mg at 04/29/22 1000    phenytoin (DILANTIN) ER capsule 100 mg  100 mg Oral BID April Storey-Jonah, APRN - CNP   100 mg at 04/29/22 1000    potassium chloride (KLOR-CON M) extended release tablet 20 mEq  20 mEq Oral BID April Storey-[place], APRN - CNP   20 mEq at 04/29/22 1000    propranolol (INDERAL) tablet 20 mg  20 mg Oral BID April GENARO Fritz CNP   20 mg at 04/29/22 0959    propranolol (INDERAL) tablet 30 mg  30 mg Oral Daily April LamGENARO - CNP   30 mg at 04/29/22 1316    pantoprazole (PROTONIX) tablet 40 mg  40 mg Oral QAM AC April KirtiBrendan APRN - CNP   40 mg at 04/29/22 9810    budesonide (PULMICORT) nebulizer suspension 500 mcg  0.5 mg Nebulization BID April DerejeJAMIR MckenzieN - CNP   500 mcg at 04/29/22 1817    Arformoterol Tartrate (BROVANA) nebulizer solution 15 mcg  15 mcg Nebulization BID April KirtiPreston, APRN - CNP   15 mcg at 04/29/22 1817    perflutren lipid microspheres (DEFINITY) injection 1.65 mg  1.5 mL IntraVENous ONCE PRN Bee Chandra APRN - DULCE MARIA        hydrocortisone sodium succinate PF (SOLU-CORTEF) injection 200 mg  200 mg IntraVENous Once Joshua Murrieta MD          sodium chloride         Physical Exam:  /65   Pulse 105   Temp 97.6 °F (36.4 °C) (Oral)   Resp 18   Ht 5' (1.524 m)   Wt 176 lb 2.4 oz (79.9 kg)   SpO2 93%   BMI 34.40 kg/m²   Wt Readings from Last 3 Encounters:   04/29/22 176 lb 2.4 oz (79.9 kg)   04/27/22 175 lb 9.6 oz (79.7 kg)   02/14/22 168 lb (76.2 kg)       Appearance: Alert and oriented x3 not in acute distress. Skin: Dry skin  Head: Atraumatic  Eyes: Intact extraocular muscles   ENMT: Mucous membranes are moist  Neck: Supple  Lungs: Clear to auscultation  Cardiac: Normal S1 and S2  Abdomen: Protuberant  Extremities: Intact range of motion  Neurologic: No focal neurological deficits  Peripheral Pulses: 2+ peripheral pulses    Intake/Output:  No intake or output data in the 24 hours ending 04/29/22 1822  No intake/output data recorded.     Laboratory Tests:  Recent Labs     04/28/22  1641 04/29/22  0315    139   K 3.9 3.8    107   CO2 22 21*   BUN 28* 22   CREATININE 0.9 0.9   GLUCOSE 105* 96   CALCIUM 9.2 8.1*     Lab Results   Component Value Date    MG 1.7 06/14/2018    MG 1.9 10/31/2017    MG 2.1 12/10/2016     Recent Labs     04/28/22  1641   ALKPHOS 208*   ALT 37*   AST 28   PROT 7.2   BILITOT 0.2   LABALBU 3.6     Recent Labs     04/28/22  1641 04/29/22  0315   WBC 10.6 10.1   RBC 4.67 3.97   HGB 14.2 12.0   HCT 41.6 36.3   MCV 89.1 91.4   MCH 30.4 30.2   MCHC 34.1 33.1   RDW 13.4 13.5    227   MPV 9.9 10.3     Lab Results   Component Value Date    CKTOTAL 100 12/10/2016    CKMB 1.3 12/10/2016    TROPONINI <0.01 07/01/2019    TROPONINI <0.01 06/13/2018    TROPONINI <0.01 10/31/2017     Recent Labs     04/28/22  1641 04/29/22  0315   TROPHS <6 <6     Lab Results   Component Value Date    INR 1.2 04/28/2022    INR 1.1 06/15/2018    INR 1.1 06/14/2018    PROTIME 13.7 (H) 04/28/2022    PROTIME 12.8 (H) 06/15/2018    PROTIME 12.7 (H) 06/14/2018     Lab Results   Component Value Date    TSH 5.320 (H) 08/17/2016    TSH 12.230 (H) 05/03/2016    TSH 6.260 (H) 06/05/2015     Lab Results   Component Value Date    LABA1C 5.5 05/03/2016    LABA1C 5.4 06/05/2015    LABA1C 5.6 01/23/2014     No results found for: EAG  Lab Results   Component Value Date    CHOL 207 (H) 12/11/2016    CHOL 195 05/03/2016    CHOL 166 01/21/2016     Lab Results   Component Value Date    TRIG 165 (H) 12/11/2016    TRIG 138 05/03/2016    TRIG 151 (H) 01/21/2016     Lab Results   Component Value Date    HDL 70 12/11/2016    HDL 60 05/03/2016    HDL 49 01/21/2016     Lab Results   Component Value Date    LDLCALC 104 (H) 12/11/2016    LDLCALC 107 (H) 05/03/2016    LDLCALC 87 01/21/2016     Lab Results   Component Value Date    LABVLDL 33 12/11/2016    LABVLDL 28 05/03/2016    LABVLDL 30 01/21/2016     No results found for: [facility]  Recent Labs     04/28/22  1641   PROBNP 340       Cardiac Tests:        Echocardiogram reviewed:     Stress test reviewed: August 2017  ? 1. No pharmacologically-induced perfusion defect identified. 2. Normal left ventricular wall motion.    3. Left ventricular ejection fraction of 80 %       CTA chest  28 2020  No significant change in overall extent of bilateral lower lobe and left   upper lobe pulmonary emboli, but there has been migration distally into the   segmental and subsegmental branches.  No evidence of significant right heart   strain or pulmonary infarct. Cardiac catheterization reviewed:     CXR reviewed: The ASCVD Risk score (Sonia Jimenez., et al., 2013) failed to calculate for the following reasons: The 2013 ASCVD risk score is only valid for ages 36 to 78    ASSESSMENT / PLAN:    1. Bilateral pulmonary embolism (HCC)  Continue anticoagulation  Obtain echocardiogram to guide therapy and to assess degree of RV strain    2. Atypical chest pain  Continue to trend cardiac enzymes and consider as needed evaluation once acute PE treatment is resolved    3. Irregular heartbeat  EKG reviewed and does not resemble atrial fibrillation but will obtain repeat EKG in the morning for close monitoring  Continue to monitor on telemetry  Zio patch heart monitor once ready to to be discharged if no significant arrhythmias are noted  Patient is already on Eliquis for PE        Thank you for allowing me to participate in your patient's care. Please feel free to contact me if you have any questions or concerns.     Michell Xiao MD  [facility]) Cardiology

## 2022-04-29 NOTE — CARE COORDINATION
Social Work / Discharge Planning : Patient admitted with bilateral Pulmonary Embolism. Patient resides alone. Goal at discharge is HOME. Await  plan. Patient was just discharged from UNIT. Therapy am/pac was 24/24 and SW provided 30 day free eliquis card. SW also discussed at length and provided A.L. list.  Patient PCP is DR Cris Man and she has [facility]. SW to follow.  Electronically signed by GUERO Henderson on 4/29/2022 at 8:24 AM

## 2022-04-29 NOTE — PROGRESS NOTES
Occupational Therapy  OCCUPATIONAL THERAPY INITIAL EVALUATION      Date:2022  Patient Name: Nellie Steele  MRN: [id number]  : 1940  Room: [address] [place] [address] & [place] [facility], [place]         Date:2022                                                  Patient Name: Nellie Steele    MRN: [id number]    : 1940    Room: 9204/5991-D      Evaluating OT: Марина Zimmerman OTR/L   [id number]      Referring Provider:GENARO Anderson CNP    Specific Provider Orders/Date:OT eval and treat 2022      Diagnosis:  Bilateral pulmonary embolism ([address] [I26.99]  Pulmonary embolism, bilateral ([address] [I26.99]     Pertinent Medical History: anxiety , asthma, chronic back pain    Precautions:  Falls    COMMENTS  eval only. Patient able to complete ADL activity. No acute OT needs at this time     Home Living: Pt lives alone, 1 floor apartment with no steps.    Laundry on 2nd floor - neighbors assist    Son lives nearby and assists as needed   Bathroom setup: tub/shower, tub seat   Equipment owned: cane      Prior Level of Function: Independent  with ADLs , assist as needed  with IADLs; ambulated with cane occasionally       Pain Level: no pain this session ;   Cognition: A&O: 4/4;    Memory:  Good    Sequencing:  Good    Problem solving:  Fair+   Judgement/safety:  Fair+     Functional Assessment:  AM-PAC Daily Activity Raw Score: 24   Initial Eval Status  Date: 22   Feeding Independent    Grooming Independent   UB Dressing Independent    LB Dressing Independent   Seated in couch   Don/doff socks    Bathing Independent    Toileting Independent    Bed Mobility  Independent   Supine <> sit    Functional Transfers Independent   Sit-stand from bed, couch    Functional Mobility Supervision, no device  Ambulated no device   No LOB or SOB    Balance Sitting:     Static:  Independent Dynamic:Independent   Standing: Independent    Activity Tolerance Fair +  With activity  No SOB       Visual/  Perceptual Glasses: none by bedside            Hand Dominance right   AROM (PROM) Strength Additional Info:    RUE  WFL WFL good  and wfl FMC/dexterity noted during ADL tasks       LUE WFL WFL good  and wfl FMC/dexterity noted during ADL tasks       Hearing: WFL   Sensation:  No c/o numbness or tingling   Tone: WFL   Edema: none observed     Comments: Upon arrival patient lying in bed . At end of session, patient returned to bed  with call light and phone within reach, all lines and tubes intact. Patient / Family Goal: return home         Eval Complexity: Low    Time In: 0941  Time Out: 0951      Min Units   OT Eval Low 97165 x  1   OT Eval Medium 45007      OT Eval High 86705      OT Re-Eval [id number]       Therapeutic Ex [id number]       Therapeutic Activities 69420       ADL/Self Care [id number]       Orthotic Management [id number]       Manual [id number]     Neuro Re-Ed 98438       Non-Billable Time          Evaluation Time additionally includes thorough review of current medical information, gathering information on past medical history/social history and prior level of function, interpretation of standardized testing/informal observation of tasks, assessment of data and development of plan of care and goals.             Minh Thornton  OTR/L  OT [id number]

## 2022-04-29 NOTE — PLAN OF CARE
Problem: Safety - Adult  Goal: Free from fall injury  4/29/2022 1156 by Venus Flores RN  Outcome: Progressing  4/29/2022 0301 by Tod Gutierrez RN  Outcome: Progressing     Problem: ABCDS Injury Assessment  Goal: Absence of physical injury  4/29/2022 1156 by Venus Flores RN  Outcome: Progressing  4/29/2022 0301 by Tod Gutierrez RN  Outcome: Progressing

## 2022-04-29 NOTE — H&P
[facility]  History and Physical      CHIEF COMPLAINT:    Chief Complaint   Patient presents with    Chest Pain    Shortness of Breath        Patient of Nataliya Ellison MD presents with:  Chest pain    History of Present Illness:   Patient is an 81year old female with a past medical history of anxiety/depression, asthma, CKD, hypertension, history of blood clots in 2011, hypothyroidism, hyperlipidemia, seizure, spinal stenosis, thrombocytopenia, and vitamin D deficiency. Patient presented to the ED with chest pain that began at 11 am 4/28/2022. Patient was at her pain management doctor and the pain began when she was leaving the office. Patient was recently discharged on 4/27/2022 with PE. Patient was placed on Eliquis and discharged home. Patient states her chest pain is mid sternal and is intermittent. She does admit that it radiates to her back. She rates the pain a 4/10. She does not associate shortness of breath or diaphoresis with the chest pain. Patient is alert and oriented on examination. Patient denies shortness of breath, fever, chills, headache, nausea, dizziness, blurred vision and abdominal pain. Patient was admitted to telemetry unit for further testing and treatment. REVIEW OF SYSTEMS:  Pertinent negatives are above in HPI. 10 point ROS otherwise negative. Past Medical History:   Diagnosis Date    Anxiety and depression 7/13/2016    Arthritis     osteo    Asthma     has chronic couch    Chronic back pain     Chronic kidney disease (CKD), stage II (mild) 04/19/2012    Creatinine around 1.3 to 1.4 in April 2012 secondary to IV antibiotics following back surgery.     Chronic osteomyelitis of lumbar spine ([facility]     note on chart from Dr. Regla Bray dated 8/17/2017    Foot pain     after back surgery / chronic    HTN (hypertension), benign 1/10/2015    Hx of blood clots     2011 / DVT, PE    Hx of migraines     Hyperlipidemia     Hypothyroidism  Hypothyroidism 2016    Neuropathy of leg     bilateral    Other postoperative infection 2011    Was on Vancomycin following the surgery for the removal of lumbar cysts. Patient on antibiotics for life     Radiculopathy, lumbar region 2012    Seizures ([address]     last seizure 2016 / gran mal    Sleeping difficulties     takes [place]    Spinal stenosis of lumbar region     Synovial cyst of lumbar spine 2011    Thrombocytopenia ([address] 2016    follows only with PCP    Tremors of nervous system     hands    Vitamin D deficiency     Wears dentures     upper         Past Surgical History:   Procedure Laterality Date    BACK SURGERY      multiple    CERVICAL FUSION  ?      SECTION      CHOLECYSTECTOMY      lap    COLONOSCOPY      EYE SURGERY Bilateral     cataract extraction    JOINT REPLACEMENT  2010    left    KNEE ARTHROPLASTY Right 2017    Total right knee arthroplasty    LUMBAR LAMINECTOMY  1985    LA COLONOSCOPY FLX DX W/COLLJ SPEC WHEN PFRMD N/A 6/15/2018    COLONOSCOPY DIAGNOSTIC performed by Kisha Lopez MD at [address] [place] [address] EGD TRANSORAL BIOPSY SINGLE/MULTIPLE N/A 6/15/2018    EGD ESOPHAGOGASTRODUODENOSCOPY performed by Kisha Lopez MD at [address]  2004    Neck    SPINE SURGERY      synovial cyst    SPINE SURGERY      rebuilt talibone    TONSILLECTOMY         Medications Prior to Admission:    Medications Prior to Admission: apixaban (ELIQUIS) 5 MG TABS tablet, Take 2 tablets by mouth 2 times daily for 12 doses  [START ON 5/3/2022] apixaban (ELIQUIS) 5 MG TABS tablet, Take 1 tablet by mouth 2 times daily  losartan (COZAAR) 25 MG tablet, Take 1 tablet by mouth daily  melatonin 3 MG TABS tablet, Take 0.5 tablets by mouth nightly as needed (sleep)  furosemide (LASIX) 40 MG tablet, Take 1 tablet by mouth daily  omeprazole (PRILOSEC) 40 MG delayed release capsule, Take 1 capsule by mouth daily  propranolol (INDERAL) 20 MG tablet, 1 in am and evening and 1.5 in afternoon (Patient taking differently: Take 20 mg by mouth 3 times daily 20 mg in am and evening and 30 mg in afternoon)  phenytoin (DILANTIN) 100 MG ER capsule, Take 1 capsule by mouth 2 times daily  fluticasone-salmeterol (ADVAIR DISKUS) 250-50 MCG/DOSE AEPB, Inhale 1 puff into the lungs every 12 hours  potassium chloride (KLOR-CON M) 20 MEQ extended release tablet, Take 20 mEq by mouth 2 times daily   doxepin (SINEQUAN) 10 MG capsule, Take 6 mg by mouth nightly  oxyCODONE (OXY-IR) 15 MG immediate release tablet, Take 15 mg by mouth every 6 hours as needed for Pain. topiramate (TOPAMAX) 25 MG tablet,   diazepam (VALIUM) 10 MG tablet, Take 1 tablet by mouth every 12 hours as needed for Anxiety (Patient not taking: Reported on 4/25/2022)  hydrochlorothiazide (HYDRODIURIL) 25 MG tablet, Take 25 mg by mouth daily   atorvastatin (LIPITOR) 10 MG tablet, Take 1 tablet by mouth nightly  levothyroxine (SYNTHROID) 25 MCG tablet, Take 25 mcg by mouth daily    Note that the patient's home medications were reviewed and the above list is accurate to the best of my knowledge at the time of the exam.    Allergies:    Albuterol, Iodine, Vistaril [hydroxyzine hcl], Gabapentin, Lyrica [pregabalin], and Skelaxin [metaxalone]    Social History:    reports that she has never smoked. She has never used smokeless tobacco. She reports that she does not drink alcohol and does not use drugs. Family History:   family history includes Cirrhosis in her brother; Heart Attack in her father; Other in her mother.       PHYSICAL EXAM:    Vitals:  BP (!) 112/55   Pulse 102   Temp 97.6 °F (36.4 °C) (Oral)   Resp 18   Ht 5' (1.524 m)   Wt 176 lb 2.4 oz (79.9 kg)   SpO2 95%   BMI 34.40 kg/m²       General appearance: NAD, conversant, pleasen  Eyes: Sclerae anicteric, PERRLA  HEENT: AT/NC, MMM  Neck: FROM, supple, no thyromegaly  Lymph: No cervical / supraclavicular lymphadenopathy  Lungs: Clear to auscultation, WOB normal  CV: RRR, no MRGs, no lower extremity edema  Abdomen: Soft, non-tender; no masses or HSM, +BS  Extremities: FROM without synovitis. No clubbing or cyanosis of the hands. Skin: no rash, induration, lesions, or ulcers  Psych: Calm and cooperative. Normal judgement and insight. Normal mood and affect. Neuro: Alert and interactive, face symmetric, speech fluent. LABS:  All labs reviewed. Of note:  CBC with Differential:    Lab Results   Component Value Date    WBC 10.1 04/29/2022    RBC 3.97 04/29/2022    HGB 12.0 04/29/2022    HCT 36.3 04/29/2022     04/29/2022    MCV 91.4 04/29/2022    MCH 30.2 04/29/2022    MCHC 33.1 04/29/2022    RDW 13.5 04/29/2022    SEGSPCT 54 07/24/2011    LYMPHOPCT 30.8 04/29/2022    MONOPCT 12.1 04/29/2022    BASOPCT 0.8 04/29/2022    MONOSABS 1.22 04/29/2022    LYMPHSABS 3.11 04/29/2022    EOSABS 0.13 04/29/2022    BASOSABS 0.08 04/29/2022     CMP:    Lab Results   Component Value Date     04/29/2022    K 3.8 04/29/2022     04/29/2022    CO2 21 04/29/2022    BUN 22 04/29/2022    CREATININE 0.9 04/29/2022    GFRAA >60 04/29/2022    LABGLOM >60 04/29/2022    GLUCOSE 96 04/29/2022    GLUCOSE 105 07/24/2011    PROT 7.2 04/28/2022    LABALBU 3.6 04/28/2022    LABALBU 3.3 07/24/2011    CALCIUM 8.1 04/29/2022    BILITOT 0.2 04/28/2022    ALKPHOS 208 04/28/2022    AST 28 04/28/2022    ALT 37 04/28/2022       Imaging:  CXR:  No acute process    CTA Chest:  No significant change in overall extent of bilateral lower lobe and left upper lobe pulmonary emboli, but here has been migration distally into the segmental and subsegmental branches. Telemetry:  Irregular    ASSESSMENT/PLAN:  Principal Problem:    Chest pain  Active Problems:    Pulmonary embolism, bilateral (HCC)  Resolved Problems:    * No resolved hospital problems.  *    81 year old female recently hospitalized with PE is admitted observation for    Chest Pain  Monitor labs - troponins negative  EKG - irregular rhythm  Cardiology consulted  Echo - pending      Pulmonary Embolis  Resume Eliquis - still on 10 bid dose for 7 days   Supplement O2 to keep saturation greater than 93%. pulm to continue to follow    Medication for other comorbidities continue as appropriate dose adjustment as necessary. DVT prophylaxis  PT OT  Discharge planning  Case discussed with attending and agreed upon plan of care. Code status: Full  Requires inpatient level of care  GENARO Keen CNP    11:07 AM  4/29/2022     Above note edited to reflect my thoughts     I personally saw, examined and provided care for the patient. Radiographs, labs and medication list were reviewed by me independently. The case was discussed in detail and plans for care were established. Review of Catie HERNANDEZ CNP, documentation was conducted and revisions were made as appropriate directly by me. I agree with the above documented exam, problem list, and plan of care.      Juliann Polo MD  3:15 PM  4/29/2022

## 2022-04-29 NOTE — PROGRESS NOTES
P Quality Flow/Interdisciplinary Rounds Progress Note        Quality Flow Rounds held on April 29, 2022    Disciplines Attending:  Bedside Nurse, ,  and Nursing Unit Leadership    Yrn Tejada was admitted on 4/28/2022  3:35 PM    Anticipated Discharge Date:       Disposition:    Santos Score:  Santos Scale Score: 21    Readmission Risk              Risk of Unplanned Readmission:  0           Discussed patient goal for the day, patient clinical progression, and barriers to discharge.   The following Goal(s) of the Day/Commitment(s) have been identified: await cardio and pulm plan       Thee Lewis RN  April 29, 2022

## 2022-04-29 NOTE — PROGRESS NOTES
Jo Wyman M.D.,[facility]  Jay Babb D.O., F.A.C.O.I., Jennifer Adrian M.D. Rakesh Stark M.D. Lexi Valentino D.O. Daily Pulmonary Progress Note    Patient:  Kate Ball 81 y.o. female MRN: [id number]     Date of Service: 4/29/2022      Synopsis     We are following patient for pulmonary emboli    \"CC\" shortness of breath    Code status: full code      Subjective   Patient was discharged on 4/27 and re-admitted within 24  hours, no new consult needed. Patient was seen and examined lying in bed in no apparent distress. She is currently on room air. She presented to the ED complaining of worsening chest pain and shortness of breath. Pain was localized and radiating to the left shoulder. Moderate in severity, intermittent and dull. She also has dyspnea, worse on exertion. She is also mildly dizzy. She is currently on Eliquis which was started on her last admission. She had a repeat  CTA done which showed migration of emboli distally into the segmental and subsegmental branches. There is no evidence of right heart strain or pulmonary infarct. Review of Systems:  Constitutional: Denies fever, weight loss, night sweats, and fatigue  Skin: Denies pigmentation, dark lesions, and rashes   HEENT: Denies hearing loss, tinnitus, ear drainage, epistaxis, sore throat, and hoarseness. Cardiovascular: Denies palpitations, +chest pain, and denies chest pressure. Respiratory: + cough, -dyspnea at rest, +dyspnea on exertion,  hemoptysis, apnea, and choking.   Gastrointestinal: Denies nausea, vomiting, poor appetite, diarrhea, heartburn or reflux  Genitourinary: Denies dysuria, frequency, urgency or hematuria  Musculoskeletal: Denies myalgias, muscle weakness, and bone pain  Neurological: Denies dizziness, vertigo, headache, and focal weakness  Psychological: Denies anxiety and depression  Endocrine: Denies heat intolerance and cold intolerance  Hematopoietic/Lymphatic: Denies bleeding problems and blood transfusions    24-hour events:  Re-admit    Objective   Vitals: BP (!) 112/55   Pulse 102   Temp 97.6 °F (36.4 °C) (Oral)   Resp 18   Ht 5' (1.524 m)   Wt 176 lb 2.4 oz (79.9 kg)   SpO2 95%   BMI 34.40 kg/m²     I/O:  No intake or output data in the 24 hours ending 04/29/22 1113    CURRENT MEDS :  Scheduled Meds:   sodium chloride flush  5-40 mL IntraVENous 2 times per day    apixaban  10 mg Oral BID    atorvastatin  10 mg Oral Nightly    furosemide  40 mg Oral Daily    hydroCHLOROthiazide  25 mg Oral Daily    levothyroxine  25 mcg Oral Daily    losartan  25 mg Oral Daily    phenytoin  100 mg Oral BID    potassium chloride  20 mEq Oral BID    propranolol  20 mg Oral BID    propranolol  30 mg Oral Daily    pantoprazole  40 mg Oral QAM AC    budesonide  0.5 mg Nebulization BID    Arformoterol Tartrate  15 mcg Nebulization BID    hydrocortisone sodium succinate PF  200 mg IntraVENous Once       Physical Exam:  General Appearance: appears comfortable in no acute distress. HEENT: Normocephalic atraumatic without obvious abnormality   Neck: Lips, mucosa, and tongue normal.  Supple, symmetrical, trachea midline, no adenopathy;thyroid:  no enlargement/tenderness/nodules or JVD. Lung: Breath sounds CTA, diminished. Respirations   unlabored. Symmetrical expansion. Heart: RRR, normal S1, S2. No MRG  Abdomen: Soft, NT, ND. BS present x 4 quadrants. No bruit or organomegaly. Extremities: Pedal pulses 2+ symmetric b/l. Extremities normal, no cyanosis, clubbing, or edema. Musculokeletal: No joint swelling, no muscle tenderness. ROM normal in all joints of extremities. Neurologic: Mental status: Alert and Oriented X3 .     Pertinent/ New Labs and Imaging Studies     Imaging Personally Reviewed:  4/28/2022 CTA chest  No significant change in overall extent of bilateral lower lobe and left   upper lobe pulmonary emboli, but there has been migration distally into the   segmental and subsegmental branches.  No evidence of significant right heart   strain or pulmonary infarct. ECHO  None on file    Labs:  Lab Results   Component Value Date    WBC 10.1 04/29/2022    HGB 12.0 04/29/2022    HCT 36.3 04/29/2022    MCV 91.4 04/29/2022    MCH 30.2 04/29/2022    MCHC 33.1 04/29/2022    RDW 13.5 04/29/2022     04/29/2022    MPV 10.3 04/29/2022     Lab Results   Component Value Date     04/29/2022    K 3.8 04/29/2022     04/29/2022    CO2 21 04/29/2022    BUN 22 04/29/2022    CREATININE 0.9 04/29/2022    LABALBU 3.6 04/28/2022    LABALBU 3.3 07/24/2011    CALCIUM 8.1 04/29/2022    GFRAA >60 04/29/2022    LABGLOM >60 04/29/2022     Lab Results   Component Value Date    PROTIME 13.7 04/28/2022    PROTIME 17.0 07/09/2011    INR 1.2 04/28/2022     Recent Labs     04/28/22  1641   PROBNP 340     No results for input(s): PROCAL in the last 72 hours. This SmartLink has not been configured with any valid records. Micro:  No results for input(s): CULTRESP in the last 72 hours. No results for input(s): LABGRAM in the last 72 hours. No results for input(s): LEGUR in the last 72 hours. No results for input(s): STREPNEUMAGU in the last 72 hours. No results for input(s): LP1UAG in the last 72 hours. Assessment:    1. Bilateral pulmonary emboli  2. Atypical chest pain        Plan:   1. Continue Eliquis lifelong  2. Incentive spirometer  3. CTA reviewed  4. Oxygen therapy as needed  5. Echo pending      This plan of care was reviewed in collaboration with Dr. Gracie Hernandez  Electronically signed by GENARO Sellers CNP on 4/29/2022 at 11:13 AM      I personally saw, examined, and cared for the patient. Labs, medications, radiographs reviewed. I agree with history exam and plans detailed in NP note. Patient returns to hospital shortly after being discharged for chest pain with radiation to shoulder blade. Suspect this is related to her pulmonary emboli.   Pulm emboli have been stable on CT imaging. There may be some clot propagation causing pleuritic pain    Pleurisy related to pulmonary emboli. This may happen for several weeks. Continue Eliquis. Reassurance given. Pain control.     Margy Rides, DO

## 2022-04-29 NOTE — PROGRESS NOTES
Physical Therapy  Facility/Department: [address] INTERMEDIATE  Physical Therapy Initial Assessment    Name: Lizzie Norris  : 1940  MRN: [id number]  Date of Service: 2022      Patient Diagnosis(es): The encounter diagnosis was Bilateral pulmonary embolism ([address] Past Medical History:  has a past medical history of Anxiety and depression, Arthritis, Asthma, Chronic back pain, Chronic kidney disease (CKD), stage II (mild), Chronic osteomyelitis of lumbar spine ([address] Foot pain, HTN (hypertension), benign, Hx of blood clots, Hx of migraines, Hyperlipidemia, Hypothyroidism, Hypothyroidism, Neuropathy of leg, Other postoperative infection, Radiculopathy, lumbar region, Seizures ([address] Sleeping difficulties, Spinal stenosis of lumbar region, Synovial cyst of lumbar spine, Thrombocytopenia ([address] Tremors of nervous system, Vitamin D deficiency, and Wears dentures. Past Surgical History:  has a past surgical history that includes lumbar laminectomy (); cervical fusion (?); joint replacement (); Spine surgery ( & ); Spine surgery (); Spine surgery (); Tonsillectomy; Colonoscopy;  section; Cholecystectomy (); eye surgery (Bilateral, ); back surgery; Spine surgery (); Knee Arthroplasty (Right, 2017); pr egd transoral biopsy single/multiple (N/A, 6/15/2018); and pr colonoscopy flx dx w/collj spec when pfrmd (N/A, 6/15/2018). Evaluating Therapist: [facility] PT      Room #:  2467/8553-R  Diagnosis:  Bilateral pulmonary embolism ([address] [I26.99]  Pulmonary embolism, bilateral ([address] [I26.99]  PMHx/PSHx:  Asthma, CKD  Precautions:  falls      Social:  Pt lives with alone in a 1 floor plan 0 steps  to enter. Prior to admission independent without device. Pt states she is not allowed to do stairs     Initial Evaluation  Date: 22   Was pt agreeable to Eval/treatment?  yes   Does pt have pain? no   Bed Mobility  Rolling: independent  Supine to sit: independent  Sit to supine: independent  Scooting: independent   Transfers Sit to stand: independent  Stand to sit: independent  Stand pivot: independent   Ambulation    150 feet with no device independent   Stair Negotiation  Ascended and descended  N/A   LE strength     4-/5   balance      Good without device   AM-PAC Raw score               24/24       Pt is alert and Oriented   LE ROM: WFL  Sensation: intact  Edema: none  Endurance: good       ASSESSMENT:    Pt displays functional ability as noted in the objective portion of this evaluation. PHYSICAL THERAPY PLAN OF CARE:    PT POC is established based on physician order and patient diagnosis     Referring provider/PT Order: April GENARO Fritz CNP/ PT eval and treat      NO PT needs at this time, pt independent      Time in  0830  Time out  0845      Evaluation Time includes thorough review of current medical information, gathering information on past medical history/social history and prior level of function, completion of standardized testing/informal observation of tasks, assessment of data and education on plan of care and goals.       CPT codes:  [x] Low Complexity PT evaluation 21304  [] Moderate Complexity PT evaluation 92929  [] High Complexity PT evaluation 63804  [] PT Re-evaluation 34361  [] Gait training 14094 minutes  [] Manual therapy 65326 minutes  [] Therapeutic activities 90741 minutes  [] Therapeutic exercises 56375 minutes  [] Neuromuscular reeducation 77366 minutes     [facility] PT [id number]

## 2022-04-30 PROCEDURE — 6360000002 HC RX W HCPCS: Performed by: NURSE PRACTITIONER

## 2022-04-30 PROCEDURE — 2580000003 HC RX 258: Performed by: NURSE PRACTITIONER

## 2022-04-30 PROCEDURE — 94640 AIRWAY INHALATION TREATMENT: CPT

## 2022-04-30 PROCEDURE — 6370000000 HC RX 637 (ALT 250 FOR IP): Performed by: NURSE PRACTITIONER

## 2022-04-30 PROCEDURE — G0378 HOSPITAL OBSERVATION PER HR: HCPCS

## 2022-04-30 RX ADMIN — POTASSIUM CHLORIDE 20 MEQ: 1500 TABLET, EXTENDED RELEASE ORAL at 09:49

## 2022-04-30 RX ADMIN — LOSARTAN POTASSIUM 25 MG: 25 TABLET, FILM COATED ORAL at 09:49

## 2022-04-30 RX ADMIN — ARFORMOTEROL TARTRATE 15 MCG: 15 SOLUTION RESPIRATORY (INHALATION) at 20:21

## 2022-04-30 RX ADMIN — APIXABAN 10 MG: 5 TABLET, FILM COATED ORAL at 20:51

## 2022-04-30 RX ADMIN — PHENYTOIN SODIUM 100 MG: 100 CAPSULE ORAL at 20:52

## 2022-04-30 RX ADMIN — PANTOPRAZOLE SODIUM 40 MG: 40 TABLET, DELAYED RELEASE ORAL at 06:08

## 2022-04-30 RX ADMIN — POTASSIUM CHLORIDE 20 MEQ: 1500 TABLET, EXTENDED RELEASE ORAL at 20:51

## 2022-04-30 RX ADMIN — PHENYTOIN SODIUM 100 MG: 100 CAPSULE ORAL at 09:49

## 2022-04-30 RX ADMIN — ARFORMOTEROL TARTRATE 15 MCG: 15 SOLUTION RESPIRATORY (INHALATION) at 08:12

## 2022-04-30 RX ADMIN — PROPRANOLOL HYDROCHLORIDE 30 MG: 20 TABLET ORAL at 11:53

## 2022-04-30 RX ADMIN — FUROSEMIDE 40 MG: 40 TABLET ORAL at 09:49

## 2022-04-30 RX ADMIN — APIXABAN 10 MG: 5 TABLET, FILM COATED ORAL at 09:56

## 2022-04-30 RX ADMIN — BUDESONIDE 500 MCG: 0.5 SUSPENSION RESPIRATORY (INHALATION) at 08:12

## 2022-04-30 RX ADMIN — LEVOTHYROXINE SODIUM 25 MCG: 25 TABLET ORAL at 06:08

## 2022-04-30 RX ADMIN — Medication 10 ML: at 20:52

## 2022-04-30 RX ADMIN — OXYCODONE 15 MG: 15 TABLET ORAL at 09:55

## 2022-04-30 RX ADMIN — PROPRANOLOL HYDROCHLORIDE 20 MG: 20 TABLET ORAL at 20:52

## 2022-04-30 RX ADMIN — HYDROCHLOROTHIAZIDE 25 MG: 25 TABLET ORAL at 09:49

## 2022-04-30 RX ADMIN — BUDESONIDE 500 MCG: 0.5 SUSPENSION RESPIRATORY (INHALATION) at 20:21

## 2022-04-30 RX ADMIN — ATORVASTATIN CALCIUM 10 MG: 10 TABLET, FILM COATED ORAL at 20:55

## 2022-04-30 RX ADMIN — Medication 10 ML: at 09:00

## 2022-04-30 ASSESSMENT — PAIN - FUNCTIONAL ASSESSMENT: PAIN_FUNCTIONAL_ASSESSMENT: PREVENTS OR INTERFERES SOME ACTIVE ACTIVITIES AND ADLS

## 2022-04-30 ASSESSMENT — PAIN DESCRIPTION - DESCRIPTORS
DESCRIPTORS: ACHING;DISCOMFORT
DESCRIPTORS: ACHING;DISCOMFORT

## 2022-04-30 ASSESSMENT — PAIN SCALES - GENERAL
PAINLEVEL_OUTOF10: 6
PAINLEVEL_OUTOF10: 3
PAINLEVEL_OUTOF10: 3
PAINLEVEL_OUTOF10: 0
PAINLEVEL_OUTOF10: 3

## 2022-04-30 ASSESSMENT — PAIN DESCRIPTION - ORIENTATION
ORIENTATION: LOWER
ORIENTATION: LOWER

## 2022-04-30 ASSESSMENT — PAIN DESCRIPTION - LOCATION
LOCATION: BACK
LOCATION: BACK

## 2022-04-30 ASSESSMENT — PAIN SCALES - WONG BAKER: WONGBAKER_NUMERICALRESPONSE: 0

## 2022-04-30 NOTE — PROGRESS NOTES
[facility]   Progress note      Subjective: The patient is awake and alert. No acute events overnight. Denies chest pain, angina, SOB     Objective:    /78 Comment: 110/70 manual  Pulse 107   Temp 97.6 °F (36.4 °C) (Oral)   Resp 18   Ht 5' (1.524 m)   Wt 178 lb 1.6 oz (80.8 kg)   SpO2 96%   BMI 34.78 kg/m²     In: 200 [P.O.:200]  Out: -   In: 200   Out: -     General appearance: NAD, conversant  HEENT: AT/NC, MMM  Neck: FROM, supple  Lungs: Clear to auscultation  CV: RRR, no MRGs  Vasc: Radial pulses 2+  Abdomen: Soft, non-tender; no masses or HSM  Extremities: No peripheral edema or digital cyanosis  Skin: no rash, lesions or ulcers  Psych: Alert and oriented to person, place and time  Neuro: Alert and interactive     Recent Labs     04/28/22  1641 04/29/22  0315   WBC 10.6 10.1   HGB 14.2 12.0   HCT 41.6 36.3    227       Recent Labs     04/28/22  1641 04/29/22  0315    139   K 3.9 3.8    107   CO2 22 21*   BUN 28* 22   CREATININE 0.9 0.9   CALCIUM 9.2 8.1*       Assessment:    Principal Problem:    Chest pain  Active Problems:    Pulmonary embolism, bilateral (HCC)  Resolved Problems:    * No resolved hospital problems.  *      Plan:    81-year-old  woman admitted for chest discomfort status post recent diagnosis of bilateral pulmonary emboli, on oral anticoagulation with Eliquis    Chest pain likely secondary to pleurisy, this will take some time to completely resolve  This was discussed with patient at length  She is hemodynamically stable and without advancement of her PEs  From medicine standpoint she may be discharged in next 24 to 48 hours  Echo still pending- doubt RV strain as she is with complete hemodynamic stability  She is quite anxious and concerned about her elevated heart rate  Zio patch planned at discharge  Cardiology is following      DVT Prophylaxis   PT/OT  Discharge Luisa Jimenez MD  10:06 AM  4/30/2022

## 2022-05-01 PROBLEM — Z86.711 HX OF PULMONARY EMBOLUS: Status: ACTIVE | Noted: 2022-05-01

## 2022-05-01 LAB
ANION GAP SERPL CALCULATED.3IONS-SCNC: 9 MMOL/L (ref 7–16)
BASOPHILS ABSOLUTE: 0.12 E9/L (ref 0–0.2)
BASOPHILS RELATIVE PERCENT: 1 % (ref 0–2)
BUN BLDV-MCNC: 24 MG/DL (ref 6–23)
CALCIUM SERPL-MCNC: 9.7 MG/DL (ref 8.6–10.2)
CHLORIDE BLD-SCNC: 99 MMOL/L (ref 98–107)
CO2: 29 MMOL/L (ref 22–29)
CREAT SERPL-MCNC: 1 MG/DL (ref 0.5–1)
EOSINOPHILS ABSOLUTE: 0.31 E9/L (ref 0.05–0.5)
EOSINOPHILS RELATIVE PERCENT: 2.6 % (ref 0–6)
GFR AFRICAN AMERICAN: >60
GFR NON-AFRICAN AMERICAN: 53 ML/MIN/1.73
GLUCOSE BLD-MCNC: 116 MG/DL (ref 74–99)
HCT VFR BLD CALC: 44.8 % (ref 34–48)
HEMOGLOBIN: 14.9 G/DL (ref 11.5–15.5)
IMMATURE GRANULOCYTES #: 0.05 E9/L
IMMATURE GRANULOCYTES %: 0.4 % (ref 0–5)
LYMPHOCYTES ABSOLUTE: 3.85 E9/L (ref 1.5–4)
LYMPHOCYTES RELATIVE PERCENT: 31.8 % (ref 20–42)
MCH RBC QN AUTO: 30.5 PG (ref 26–35)
MCHC RBC AUTO-ENTMCNC: 33.3 % (ref 32–34.5)
MCV RBC AUTO: 91.8 FL (ref 80–99.9)
MONOCYTES ABSOLUTE: 1.39 E9/L (ref 0.1–0.95)
MONOCYTES RELATIVE PERCENT: 11.5 % (ref 2–12)
NEUTROPHILS ABSOLUTE: 6.37 E9/L (ref 1.8–7.3)
NEUTROPHILS RELATIVE PERCENT: 52.7 % (ref 43–80)
PDW BLD-RTO: 13.6 FL (ref 11.5–15)
PLATELET # BLD: 319 E9/L (ref 130–450)
PMV BLD AUTO: 10.6 FL (ref 7–12)
POTASSIUM SERPL-SCNC: 4.5 MMOL/L (ref 3.5–5)
RBC # BLD: 4.88 E12/L (ref 3.5–5.5)
SODIUM BLD-SCNC: 137 MMOL/L (ref 132–146)
URINE CULTURE, ROUTINE: NORMAL
WBC # BLD: 12.1 E9/L (ref 4.5–11.5)

## 2022-05-01 PROCEDURE — 36415 COLL VENOUS BLD VENIPUNCTURE: CPT

## 2022-05-01 PROCEDURE — 2580000003 HC RX 258: Performed by: NURSE PRACTITIONER

## 2022-05-01 PROCEDURE — 99232 SBSQ HOSP IP/OBS MODERATE 35: CPT | Performed by: INTERNAL MEDICINE

## 2022-05-01 PROCEDURE — 85025 COMPLETE CBC W/AUTO DIFF WBC: CPT

## 2022-05-01 PROCEDURE — 80048 BASIC METABOLIC PNL TOTAL CA: CPT

## 2022-05-01 PROCEDURE — G0378 HOSPITAL OBSERVATION PER HR: HCPCS

## 2022-05-01 PROCEDURE — 6370000000 HC RX 637 (ALT 250 FOR IP): Performed by: NURSE PRACTITIONER

## 2022-05-01 PROCEDURE — 2060000000 HC ICU INTERMEDIATE R&B

## 2022-05-01 PROCEDURE — 6360000002 HC RX W HCPCS: Performed by: NURSE PRACTITIONER

## 2022-05-01 PROCEDURE — 94640 AIRWAY INHALATION TREATMENT: CPT

## 2022-05-01 RX ADMIN — PROPRANOLOL HYDROCHLORIDE 20 MG: 20 TABLET ORAL at 08:34

## 2022-05-01 RX ADMIN — FUROSEMIDE 40 MG: 40 TABLET ORAL at 08:33

## 2022-05-01 RX ADMIN — LOSARTAN POTASSIUM 25 MG: 25 TABLET, FILM COATED ORAL at 08:33

## 2022-05-01 RX ADMIN — ARFORMOTEROL TARTRATE 15 MCG: 15 SOLUTION RESPIRATORY (INHALATION) at 08:39

## 2022-05-01 RX ADMIN — ARFORMOTEROL TARTRATE 15 MCG: 15 SOLUTION RESPIRATORY (INHALATION) at 20:16

## 2022-05-01 RX ADMIN — PHENYTOIN SODIUM 100 MG: 100 CAPSULE ORAL at 08:32

## 2022-05-01 RX ADMIN — HYDROCHLOROTHIAZIDE 25 MG: 25 TABLET ORAL at 08:33

## 2022-05-01 RX ADMIN — APIXABAN 10 MG: 5 TABLET, FILM COATED ORAL at 08:32

## 2022-05-01 RX ADMIN — POTASSIUM CHLORIDE 20 MEQ: 1500 TABLET, EXTENDED RELEASE ORAL at 08:33

## 2022-05-01 RX ADMIN — BUDESONIDE 500 MCG: 0.5 SUSPENSION RESPIRATORY (INHALATION) at 20:16

## 2022-05-01 RX ADMIN — PROPRANOLOL HYDROCHLORIDE 20 MG: 20 TABLET ORAL at 20:00

## 2022-05-01 RX ADMIN — ATORVASTATIN CALCIUM 10 MG: 10 TABLET, FILM COATED ORAL at 20:00

## 2022-05-01 RX ADMIN — PANTOPRAZOLE SODIUM 40 MG: 40 TABLET, DELAYED RELEASE ORAL at 05:15

## 2022-05-01 RX ADMIN — POTASSIUM CHLORIDE 20 MEQ: 1500 TABLET, EXTENDED RELEASE ORAL at 20:00

## 2022-05-01 RX ADMIN — PHENYTOIN SODIUM 100 MG: 100 CAPSULE ORAL at 20:30

## 2022-05-01 RX ADMIN — Medication 10 ML: at 08:35

## 2022-05-01 RX ADMIN — LEVOTHYROXINE SODIUM 25 MCG: 25 TABLET ORAL at 05:15

## 2022-05-01 RX ADMIN — PROPRANOLOL HYDROCHLORIDE 30 MG: 20 TABLET ORAL at 12:00

## 2022-05-01 RX ADMIN — APIXABAN 10 MG: 5 TABLET, FILM COATED ORAL at 20:30

## 2022-05-01 RX ADMIN — BUDESONIDE 500 MCG: 0.5 SUSPENSION RESPIRATORY (INHALATION) at 08:40

## 2022-05-01 RX ADMIN — OXYCODONE 15 MG: 15 TABLET ORAL at 15:39

## 2022-05-01 RX ADMIN — Medication 10 ML: at 20:00

## 2022-05-01 RX ADMIN — OXYCODONE 15 MG: 15 TABLET ORAL at 03:27

## 2022-05-01 ASSESSMENT — PAIN DESCRIPTION - DESCRIPTORS: DESCRIPTORS: ACHING;DISCOMFORT

## 2022-05-01 ASSESSMENT — PAIN SCALES - GENERAL
PAINLEVEL_OUTOF10: 6
PAINLEVEL_OUTOF10: 0
PAINLEVEL_OUTOF10: 7
PAINLEVEL_OUTOF10: 0

## 2022-05-01 ASSESSMENT — PAIN DESCRIPTION - FREQUENCY: FREQUENCY: INTERMITTENT

## 2022-05-01 ASSESSMENT — PAIN DESCRIPTION - ORIENTATION: ORIENTATION: LOWER

## 2022-05-01 ASSESSMENT — PAIN DESCRIPTION - LOCATION: LOCATION: BACK

## 2022-05-01 ASSESSMENT — PAIN DESCRIPTION - ONSET: ONSET: GRADUAL

## 2022-05-01 ASSESSMENT — PAIN DESCRIPTION - PAIN TYPE: TYPE: ACUTE PAIN

## 2022-05-01 ASSESSMENT — PAIN - FUNCTIONAL ASSESSMENT: PAIN_FUNCTIONAL_ASSESSMENT: PREVENTS OR INTERFERES SOME ACTIVE ACTIVITIES AND ADLS

## 2022-05-01 ASSESSMENT — PAIN SCALES - WONG BAKER: WONGBAKER_NUMERICALRESPONSE: 0

## 2022-05-01 NOTE — PROGRESS NOTES
[facility]) Physicians        CARDIOLOGY                 INPATIENT PROGRESS NOTE          PATIENT SEEN IN FOLLOW UP FOR: Sinus tachycardia    Hospital Day: 4     Milka Wallace is a 81year old patient known to Dr. Aurora Lemus from initial consult      SUBJECTIVE: Having occ CP, at rest, sharp. No associated Sx; lasts few seconds, relieve on nits own. No orthopnea    ROS: Review of rest of 10 systems negative except as mentioned above    OBJECTIVE: No acute distress. See Assessment     Diagnostics:       Telemetry: Sinus tachy 105-118    12 lead EKG: Sinus tachy        No intake or output data in the 24 hours ending 05/01/22 1331    Labs:   CBC: Recent Labs     04/28/22  1641 04/29/22  0315   WBC 10.6 10.1   HGB 14.2 12.0   HCT 41.6 36.3    227     BMP:   Recent Labs     04/29/22  0315 05/01/22  1145    137   K 3.8 4.5   CO2 21* 29   BUN 22 24*   CREATININE 0.9 1.0   LABGLOM >60 53   CALCIUM 8.1* 9.7     Mag: No results for input(s): MG in the last 72 hours. Phos: No results for input(s): PHOS in the last 72 hours. TSH:   Recent Labs     04/29/22 2009   TSH 2.240     HgA1c:     BNP: No results for input(s): BNP in the last 72 hours. PT/INR:   Recent Labs     04/28/22  2324   PROTIME 13.7*   INR 1.2     APTT:No results for input(s): APTT in the last 72 hours. CARDIAC ENZYMES:No results for input(s): CKTOTAL, CKMB, CKMBINDEX, TROPONINI in the last 72 hours.   FASTING LIPID PANEL:  Lab Results   Component Value Date    CHOL 207 12/11/2016    HDL 70 12/11/2016    LDLCALC 104 12/11/2016    TRIG 165 12/11/2016     LIVER PROFILE:  Recent Labs     04/28/22  1641   AST 28   ALT 37*   LABALBU 3.6       Current Inpatient Medications:   sodium chloride flush  5-40 mL IntraVENous 2 times per day    apixaban  10 mg Oral BID    atorvastatin  10 mg Oral Nightly    furosemide  40 mg Oral Daily    hydroCHLOROthiazide  25 mg Oral Daily    levothyroxine  25 mcg Oral Daily    losartan  25 mg Oral Daily    phenytoin  100 mg Oral BID    potassium chloride  20 mEq Oral BID    propranolol  20 mg Oral BID    propranolol  30 mg Oral Daily    pantoprazole  40 mg Oral QAM AC    budesonide  0.5 mg Nebulization BID    Arformoterol Tartrate  15 mcg Nebulization BID    hydrocortisone sodium succinate PF  200 mg IntraVENous Once       IV Infusions (if any):   sodium chloride           PHYSICAL EXAM:     CONSTITUTIONAL:   /75   Pulse 106   Temp 98.2 °F (36.8 °C) (Oral)   Resp 20   Ht 5' (1.524 m)   Wt 172 lb 6.4 oz (78.2 kg)   SpO2 95%   BMI 33.67 kg/m²   Pulse  Av.3  Min: 108  Max: 109  Systolic (13DTG), EJF:000 , Min:99 , HXW:468    Diastolic (93ZWG), RHL:59, Min:61, Max:75    In general, this is a well developed, well nourished who appears stated age. awake, alert, no apparent distress  HEENT: eyes -conjunctivae pink,  Throat - Oral mucosa moist.   Neck-  no stridor, no carotid bruit. no jugular venous distention   RESPIRATORY: Chest symmetrical and non-tender to palpation. No accessory muscle use. Lung auscultation -few rhonchi  CARDIOVASCULAR:     Heart Palpation - no palpable thrills    Heart Ausculation - Regular rate and rhythm, 2/6 systolic murmur, No s3 or rub. No lower extremity edema, Distal pulses palpable, no clubbing or cyanosis   ABDOMEN: Soft, nontender, Bowel sounds present. MS: good muscle strength and tone. : Deferred  Rectal Exam: Deferred  SKIN: warm and dry  NEURO / PSYCH: oriented to person, place        ASSESSMENT/PLAN:     Chest pain  - Atypical, likely from PE - Echo pending    Pulmonary embolism, bilateral (HCC) - On OAC    Sinus tachycardia - (102-118) Likely from her PE. TSH normal. Continue Propranolol. Increase If BP tolerates. Out pt Event monitor    Essential Tremors - On Propranolol             Above recommendations discussed with her.     D/w her Son, Elizabet Garcia over phone and all questions snwered    F/u Dr Kavita Zhu 2-4 weeks after discharge    Electronically signed by Trevin Nix MD on 5/1/2022 at [address] [facility]

## 2022-05-01 NOTE — PROGRESS NOTES
[facility]   Progress note      Subjective:    She looks well  Denies any acute complaints  No shortness of breath or palpitations    Objective:    /75   Pulse 120   Temp 98.2 °F (36.8 °C) (Oral)   Resp 20   Ht 5' (1.524 m)   Wt 172 lb 6.4 oz (78.2 kg)   SpO2 95%   BMI 33.67 kg/m²     In: 200 [P.O.:200]  Out: -   In: 200   Out: -     General appearance: NAD, conversant  HEENT: AT/NC, MMM  Neck: FROM, supple  Lungs: Clear to auscultation  CV: RRR, no MRGs  Vasc: Radial pulses 2+  Abdomen: Soft, non-tender; no masses or HSM  Extremities: No peripheral edema or digital cyanosis  Skin: no rash, lesions or ulcers  Psych: Alert and oriented to person, place and time  Neuro: Alert and interactive     Recent Labs     04/28/22  1641 04/29/22  0315 05/01/22  1145   WBC 10.6 10.1 12.1*   HGB 14.2 12.0 14.9   HCT 41.6 36.3 44.8    227 319       Recent Labs     04/28/22  1641 04/29/22  0315 05/01/22  1145    139 137   K 3.9 3.8 4.5    107 99   CO2 22 21* 29   BUN 28* 22 24*   CREATININE 0.9 0.9 1.0   CALCIUM 9.2 8.1* 9.7       Assessment:    Principal Problem:    Chest pain  Active Problems:    Pulmonary embolism, bilateral (HCC)  Resolved Problems:    * No resolved hospital problems.  *      Plan:    45-year-old  woman admitted for chest discomfort status post recent diagnosis of bilateral pulmonary emboli, on oral anticoagulation with Eliquis    Chest pain likely secondary to pleurisy, this will take some time to completely resolve  She is hemodynamically stable and without advancement of her PEs  From medicine standpoint she may be discharged in next 24 hours, once echo is done  Echo still pending- doubt RV strain as she is with complete hemodynamic stability  She is quite anxious and concerned about her elevated heart rate-improved to around 100 today  Echocardiogram has not yet been done- holding up  discharge  Zio patch planned at discharge  Cardiology is following      DVT Prophylaxis   PT/OT  Discharge planning           Melanie Quiles MD  3:25 PM  5/1/2022

## 2022-05-01 NOTE — PLAN OF CARE
Problem: Safety - Adult  Goal: Free from fall injury  4/30/2022 2156 by Margaret Montero RN  Outcome: Progressing  4/30/2022 0959 by Shanice Gamble RN  Outcome: Progressing     Problem: ABCDS Injury Assessment  Goal: Absence of physical injury  4/30/2022 2156 by Margaret Montero RN  Outcome: Progressing  4/30/2022 0959 by Shanice Gamble RN  Outcome: Progressing     Problem: Pain  Goal: Verbalizes/displays adequate comfort level or baseline comfort level  4/30/2022 2156 by Margaret Montero RN  Outcome: Progressing  4/30/2022 0959 by Shanice Gamble RN  Outcome: Progressing

## 2022-05-02 VITALS
OXYGEN SATURATION: 98 % | TEMPERATURE: 98 F | BODY MASS INDEX: 34.08 KG/M2 | HEART RATE: 78 BPM | DIASTOLIC BLOOD PRESSURE: 58 MMHG | HEIGHT: 60 IN | RESPIRATION RATE: 18 BRPM | SYSTOLIC BLOOD PRESSURE: 117 MMHG | WEIGHT: 173.6 LBS

## 2022-05-02 LAB
EKG ATRIAL RATE: 106 BPM
EKG P AXIS: -24 DEGREES
EKG P-R INTERVAL: 236 MS
EKG Q-T INTERVAL: 320 MS
EKG QRS DURATION: 86 MS
EKG QTC CALCULATION (BAZETT): 425 MS
EKG R AXIS: 6 DEGREES
EKG T AXIS: 3 DEGREES
EKG VENTRICULAR RATE: 106 BPM
LV EF: 58 %
LVEF MODALITY: NORMAL

## 2022-05-02 PROCEDURE — 6360000002 HC RX W HCPCS: Performed by: NURSE PRACTITIONER

## 2022-05-02 PROCEDURE — 6370000000 HC RX 637 (ALT 250 FOR IP): Performed by: NURSE PRACTITIONER

## 2022-05-02 PROCEDURE — 2580000003 HC RX 258: Performed by: NURSE PRACTITIONER

## 2022-05-02 PROCEDURE — 94640 AIRWAY INHALATION TREATMENT: CPT

## 2022-05-02 PROCEDURE — 36415 COLL VENOUS BLD VENIPUNCTURE: CPT

## 2022-05-02 PROCEDURE — 93306 TTE W/DOPPLER COMPLETE: CPT

## 2022-05-02 RX ADMIN — FUROSEMIDE 40 MG: 40 TABLET ORAL at 09:02

## 2022-05-02 RX ADMIN — PROPRANOLOL HYDROCHLORIDE 20 MG: 20 TABLET ORAL at 09:01

## 2022-05-02 RX ADMIN — ARFORMOTEROL TARTRATE 15 MCG: 15 SOLUTION RESPIRATORY (INHALATION) at 09:23

## 2022-05-02 RX ADMIN — BUDESONIDE 500 MCG: 0.5 SUSPENSION RESPIRATORY (INHALATION) at 09:23

## 2022-05-02 RX ADMIN — PROPRANOLOL HYDROCHLORIDE 30 MG: 20 TABLET ORAL at 11:40

## 2022-05-02 RX ADMIN — LOSARTAN POTASSIUM 25 MG: 25 TABLET, FILM COATED ORAL at 09:01

## 2022-05-02 RX ADMIN — HYDROCHLOROTHIAZIDE 25 MG: 25 TABLET ORAL at 09:01

## 2022-05-02 RX ADMIN — Medication 10 ML: at 09:02

## 2022-05-02 RX ADMIN — LEVOTHYROXINE SODIUM 25 MCG: 25 TABLET ORAL at 05:37

## 2022-05-02 RX ADMIN — PHENYTOIN SODIUM 100 MG: 100 CAPSULE ORAL at 09:02

## 2022-05-02 RX ADMIN — OXYCODONE 15 MG: 15 TABLET ORAL at 11:43

## 2022-05-02 RX ADMIN — POTASSIUM CHLORIDE 20 MEQ: 1500 TABLET, EXTENDED RELEASE ORAL at 09:02

## 2022-05-02 RX ADMIN — APIXABAN 10 MG: 5 TABLET, FILM COATED ORAL at 09:01

## 2022-05-02 RX ADMIN — PANTOPRAZOLE SODIUM 40 MG: 40 TABLET, DELAYED RELEASE ORAL at 05:37

## 2022-05-02 ASSESSMENT — PAIN SCALES - GENERAL
PAINLEVEL_OUTOF10: 0
PAINLEVEL_OUTOF10: 7

## 2022-05-02 ASSESSMENT — PAIN DESCRIPTION - DESCRIPTORS: DESCRIPTORS: SHOOTING;SHARP;DISCOMFORT

## 2022-05-02 ASSESSMENT — PAIN SCALES - WONG BAKER: WONGBAKER_NUMERICALRESPONSE: 0

## 2022-05-02 ASSESSMENT — PAIN DESCRIPTION - LOCATION: LOCATION: GENERALIZED;BACK

## 2022-05-02 NOTE — PROGRESS NOTES
Lamin Jacobsen M.D.,[facility]  Yakelin Barba D.O., F.A.C.O.I., Flores Velasquez M.D. Tammi Saunders M.D. Nathaniel James D.O. Daily Pulmonary Progress Note    Patient:  Humaira Tipton 81 y.o. female MRN: [id number]     Date of Service: 5/2/2022      Synopsis     We are following patient for pulmonary emboli    \"CC\" shortness of breath    Code status: full code      Subjective   Lying in bed in no acute distress. No cough mucus or hemoptysis. Chest pain. 2D echo completed today with no evidence of RV strain. Wants to go home soon. Tolerating Eliquis. Review of Systems:  Constitutional: Denies fever, weight loss, night sweats, and fatigue  Skin: Denies pigmentation, dark lesions, and rashes   HEENT: Denies hearing loss, tinnitus, ear drainage, epistaxis, sore throat, and hoarseness. Cardiovascular: Denies palpitations, +chest pain, and denies chest pressure. Respiratory: + cough, -dyspnea at rest, +dyspnea on exertion,  hemoptysis, apnea, and choking.   Gastrointestinal: Denies nausea, vomiting, poor appetite, diarrhea, heartburn or reflux  Genitourinary: Denies dysuria, frequency, urgency or hematuria  Musculoskeletal: Denies myalgias, muscle weakness, and bone pain  Neurological: Denies dizziness, vertigo, headache, and focal weakness  Psychological: Denies anxiety and depression  Endocrine: Denies heat intolerance and cold intolerance  Hematopoietic/Lymphatic: Denies bleeding problems and blood transfusions    24-hour events:   none    Objective   Vitals: BP (!) 117/58   Pulse 78   Temp 98 °F (36.7 °C) (Oral)   Resp 18   Ht 5' (1.524 m)   Wt 173 lb 9.6 oz (78.7 kg)   SpO2 98%   BMI 33.90 kg/m²     I/O:  No intake or output data in the 24 hours ending 05/02/22 1525    CURRENT MEDS :  Scheduled Meds:   sodium chloride flush  5-40 mL IntraVENous 2 times per day    apixaban  10 mg Oral BID    atorvastatin  10 mg Oral Nightly    furosemide  40 mg Oral Daily    hydroCHLOROthiazide  25 mg Oral Daily    levothyroxine  25 mcg Oral Daily    losartan  25 mg Oral Daily    phenytoin  100 mg Oral BID    potassium chloride  20 mEq Oral BID    propranolol  20 mg Oral BID    propranolol  30 mg Oral Daily    pantoprazole  40 mg Oral QAM AC    budesonide  0.5 mg Nebulization BID    Arformoterol Tartrate  15 mcg Nebulization BID    hydrocortisone sodium succinate PF  200 mg IntraVENous Once       Physical Exam:  General Appearance: appears comfortable in no acute distress. HEENT: Normocephalic atraumatic without obvious abnormality   Neck: Lips, mucosa, and tongue normal.  Supple, symmetrical, trachea midline, no adenopathy;thyroid:  no enlargement/tenderness/nodules or JVD. Lung: Breath sounds CTA, diminished. Respirations   unlabored. Symmetrical expansion. Heart: RRR, normal S1, S2. No MRG  Abdomen: Soft, NT, ND. BS present x 4 quadrants. No bruit or organomegaly. Extremities: Pedal pulses 2+ symmetric b/l. Extremities normal, no cyanosis, clubbing, or edema. Musculokeletal: No joint swelling, no muscle tenderness. ROM normal in all joints of extremities. Neurologic: Mental status: Alert and Oriented X3 . Pertinent/ New Labs and Imaging Studies     Imaging Personally Reviewed:  4/28/2022 CTA chest  No significant change in overall extent of bilateral lower lobe and left   upper lobe pulmonary emboli, but there has been migration distally into the   segmental and subsegmental branches. No evidence of significant right heart   strain or pulmonary infarct. 4/28/2022 chest x-ray  FINDINGS:   The lungs are without acute focal process. There is no effusion or   pneumothorax. The cardiomediastinal silhouette is without acute process. The   osseous structures are without acute process. Impression   No acute process. ECHO  5/2/2022   Conclusions      Summary   Normal left ventricular chamber size. Normal left ventricular systolic function.    Visually estimated LVEF is 55-60 %. No wall motion abnormalities. Indeterminate diastolic function. Normal right ventricle structure and function. Normal left atrial size. Normal right atrial size. No comparison study available. Unable to estimate PA pressure. No comparison study available. Labs:  Lab Results   Component Value Date    WBC 12.1 05/01/2022    HGB 14.9 05/01/2022    HCT 44.8 05/01/2022    MCV 91.8 05/01/2022    MCH 30.5 05/01/2022    MCHC 33.3 05/01/2022    RDW 13.6 05/01/2022     05/01/2022    MPV 10.6 05/01/2022     Lab Results   Component Value Date     05/01/2022    K 4.5 05/01/2022    K 3.8 04/29/2022    CL 99 05/01/2022    CO2 29 05/01/2022    BUN 24 05/01/2022    CREATININE 1.0 05/01/2022    LABALBU 3.6 04/28/2022    LABALBU 3.3 07/24/2011    CALCIUM 9.7 05/01/2022    GFRAA >60 05/01/2022    LABGLOM 53 05/01/2022     Lab Results   Component Value Date    PROTIME 13.7 04/28/2022    PROTIME 17.0 07/09/2011    INR 1.2 04/28/2022     No results for input(s): PROBNP in the last 72 hours. No results for input(s): PROCAL in the last 72 hours. This SmartLink has not been configured with any valid records. Micro:  No results for input(s): CULTRESP in the last 72 hours. No results for input(s): LABGRAM in the last 72 hours. No results for input(s): LEGUR in the last 72 hours. No results for input(s): STREPNEUMAGU in the last 72 hours. No results for input(s): LP1UAG in the last 72 hours. Assessment:    Bilateral pulmonary emboli  Atypical chest pain likely from clot propagation of PE as seen on CT chest imaging        Plan:   Oxygen weaned off no need upon discharge   continue Eliquis lifelong  Incentive spirometer  Echo completed no evidence of RV strain  Okay to discharge today from a pulmonary perspective when okay with others. We will arrange follow-up as outpatient in 2 weeks.     This plan of care was reviewed in collaboration with Dr. Jaiden Atkinson  Electronically signed by Kae Vela, GENARO - CNP on 5/2/2022 at 3:25 PM    I personally saw, examined, and cared for the patient. Labs, medications, radiographs reviewed. I agree with history exam and plans detailed in NP note.     Mina Lei MD

## 2022-05-02 NOTE — PROGRESS NOTES
P Quality Flow/Interdisciplinary Rounds Progress Note        Quality Flow Rounds held on May 2, 2022    Disciplines Attending:  Bedside Nurse, ,  and Nursing Unit Leadership    Kira Easley was admitted on 4/28/2022  3:35 PM    Anticipated Discharge Date:       Disposition:    Santos Score:  Santos Scale Score: 20    Readmission Risk              Risk of Unplanned Readmission:  16           Discussed patient goal for the day, patient clinical progression, and barriers to discharge. The following Goal(s) of the Day/Commitment(s) have been identified:  echoBria Bowden RN  May 2, 2022

## 2022-05-02 NOTE — PLAN OF CARE
Problem: Safety - Adult  Goal: Free from fall injury  Outcome: Completed     Problem: ABCDS Injury Assessment  Goal: Absence of physical injury  Outcome: Completed     Problem: Pain  Goal: Verbalizes/displays adequate comfort level or baseline comfort level  Outcome: Completed     Problem: Respiratory - Adult  Goal: Achieves optimal ventilation and oxygenation  Outcome: Completed     Problem: Cardiovascular - Adult  Goal: Maintains optimal cardiac output and hemodynamic stability  Outcome: Completed  Goal: Absence of cardiac dysrhythmias or at baseline  Outcome: Completed     Problem: Skin/Tissue Integrity - Adult  Goal: Skin integrity remains intact  Outcome: Completed  Goal: Incisions, wounds, or drain sites healing without S/S of infection  Outcome: Completed  Goal: Oral mucous membranes remain intact  Outcome: Completed     Problem: Musculoskeletal - Adult  Goal: Return mobility to safest level of function  Outcome: Completed     Problem: Hematologic - Adult  Goal: Maintains hematologic stability  Outcome: Completed

## 2022-05-02 NOTE — PROGRESS NOTES
Pt discharged to home. Instructions were went over with patient and again with patient son. Patient and son were both satisfied with discharge plan. IV removed and heart monitor returned.  Alvarez Pozo RN

## 2022-05-03 ENCOUNTER — TELEPHONE (OUTPATIENT)
Dept: CARDIOLOGY CLINIC | Age: 82
End: 2022-05-03

## 2022-05-03 NOTE — PROGRESS NOTES
Physician Progress Note      PATIENT:               Pawan Matthews  CSN #:                  [id number]  :                       1940  ADMIT DATE:       2022 3:35 PM  Lynn Girard DATE:        2022 5:00 PM  RESPONDING  PROVIDER #:        FRANCHESKA Santana MD          QUERY TEXT:    Pt admitted with pleurisy and has pulmonary embolism documented. If possible,   please document in progress notes and discharge summary if you are evaluating   and/or treating any of the following: The medical record reflects the following:  Risk Factors: PE  Clinical Indicators: per CTA chest \". Chrisjohnnie Rapp No significant change in overall extent   of bilateral lower lobe and left upper lobe pulmonary emboli, but there has   been migration distally into the segmental and subsegmental branches. Chris Rapp \", per   H&P \". Chris Rapp Patient was recently discharged on 2022 with PE. Patient was   placed on Eliquis and discharged home. Chris Rapp \", and per Pulmonology consult \". Chris Rapp She   is currently on Eliquis which was started on her last admission. She had a   repeat  CTA done which showed migration of emboli distally into the segmental   and subsegmental branches. There is no ev  Treatment: PO Eliquis    Thank you,  Prashant HEDRICK, RN, CDIS  Clinical Documentation Improvement  [email or web address]  [phone]  Options provided:  -- Acute pulmonary embolism  -- Chronic pulmonary embolism  -- Other - I will add my own diagnosis  -- Disagree - Not applicable / Not valid  -- Disagree - Clinically unable to determine / Unknown  -- Refer to Clinical Documentation Reviewer    PROVIDER RESPONSE TEXT:    Recent pulmonary embolism being treated with oac , now causing pleurisy    Query created by: Colonel Delgado on 2022 12:48 PM      Electronically signed by:  FRANCHESKA Santana MD 5/3/2022 8:53 AM

## 2022-05-04 LAB
BLOOD CULTURE, ROUTINE: NORMAL
CULTURE, BLOOD 2: NORMAL
Lab: NORMAL
REPORT: NORMAL
THIS TEST SENT TO: NORMAL

## 2022-05-06 NOTE — TELEPHONE ENCOUNTER
Follow-up in 4 to 12 weeks. Follow-up sooner if there are symptoms. If symptoms are significant to go to the emergency room.   Also please follow-up with your PCP

## 2022-05-09 ENCOUNTER — HOSPITAL ENCOUNTER (EMERGENCY)
Age: 82
Discharge: HOME | End: 2022-05-09
Attending: EMERGENCY MEDICINE
Payer: MEDICARE

## 2022-05-09 ENCOUNTER — APPOINTMENT (OUTPATIENT)
Dept: CT IMAGING | Age: 82
End: 2022-05-09
Payer: MEDICARE

## 2022-05-09 ENCOUNTER — APPOINTMENT (OUTPATIENT)
Dept: GENERAL RADIOLOGY | Age: 82
End: 2022-05-09
Payer: MEDICARE

## 2022-05-09 VITALS
RESPIRATION RATE: 18 BRPM | SYSTOLIC BLOOD PRESSURE: 107 MMHG | HEART RATE: 70 BPM | BODY MASS INDEX: 33.96 KG/M2 | OXYGEN SATURATION: 95 % | WEIGHT: 173 LBS | TEMPERATURE: 98 F | DIASTOLIC BLOOD PRESSURE: 59 MMHG | HEIGHT: 60 IN

## 2022-05-09 DIAGNOSIS — R10.9 LEFT FLANK PAIN: ICD-10-CM

## 2022-05-09 DIAGNOSIS — E86.0 DEHYDRATION: Primary | ICD-10-CM

## 2022-05-09 LAB
ALBUMIN SERPL-MCNC: 3.5 G/DL (ref 3.5–5.2)
ALP BLD-CCNC: 176 U/L (ref 35–104)
ALT SERPL-CCNC: 24 U/L (ref 0–32)
ANION GAP SERPL CALCULATED.3IONS-SCNC: 8 MMOL/L (ref 7–16)
AST SERPL-CCNC: 24 U/L (ref 0–31)
BACTERIA: ABNORMAL /HPF
BASOPHILS ABSOLUTE: 0.08 E9/L (ref 0–0.2)
BASOPHILS RELATIVE PERCENT: 1 % (ref 0–2)
BILIRUB SERPL-MCNC: 0.3 MG/DL (ref 0–1.2)
BILIRUBIN URINE: NEGATIVE
BLOOD, URINE: NEGATIVE
BUN BLDV-MCNC: 18 MG/DL (ref 6–23)
CALCIUM SERPL-MCNC: 9.3 MG/DL (ref 8.6–10.2)
CHLORIDE BLD-SCNC: 104 MMOL/L (ref 98–107)
CLARITY: CLEAR
CO2: 23 MMOL/L (ref 22–29)
COLOR: YELLOW
CREAT SERPL-MCNC: 0.8 MG/DL (ref 0.5–1)
EKG ATRIAL RATE: 70 BPM
EKG P AXIS: 46 DEGREES
EKG P-R INTERVAL: 212 MS
EKG Q-T INTERVAL: 400 MS
EKG QRS DURATION: 82 MS
EKG QTC CALCULATION (BAZETT): 432 MS
EKG R AXIS: 27 DEGREES
EKG T AXIS: 42 DEGREES
EKG VENTRICULAR RATE: 70 BPM
EOSINOPHILS ABSOLUTE: 0.23 E9/L (ref 0.05–0.5)
EOSINOPHILS RELATIVE PERCENT: 3 % (ref 0–6)
GFR AFRICAN AMERICAN: >60
GFR NON-AFRICAN AMERICAN: >60 ML/MIN/1.73
GLUCOSE BLD-MCNC: 140 MG/DL (ref 74–99)
GLUCOSE URINE: NEGATIVE MG/DL
HCT VFR BLD CALC: 39.2 % (ref 34–48)
HEMOGLOBIN: 12.7 G/DL (ref 11.5–15.5)
IMMATURE GRANULOCYTES #: 0.03 E9/L
IMMATURE GRANULOCYTES %: 0.4 % (ref 0–5)
KETONES, URINE: NEGATIVE MG/DL
LACTIC ACID: 1.2 MMOL/L (ref 0.5–2.2)
LEUKOCYTE ESTERASE, URINE: ABNORMAL
LIPASE: 13 U/L (ref 13–60)
LYMPHOCYTES ABSOLUTE: 1.96 E9/L (ref 1.5–4)
LYMPHOCYTES RELATIVE PERCENT: 25.3 % (ref 20–42)
MCH RBC QN AUTO: 29.8 PG (ref 26–35)
MCHC RBC AUTO-ENTMCNC: 32.4 % (ref 32–34.5)
MCV RBC AUTO: 92 FL (ref 80–99.9)
MONOCYTES ABSOLUTE: 0.71 E9/L (ref 0.1–0.95)
MONOCYTES RELATIVE PERCENT: 9.2 % (ref 2–12)
NEUTROPHILS ABSOLUTE: 4.73 E9/L (ref 1.8–7.3)
NEUTROPHILS RELATIVE PERCENT: 61.1 % (ref 43–80)
NITRITE, URINE: NEGATIVE
PDW BLD-RTO: 13.2 FL (ref 11.5–15)
PH UA: 7 (ref 5–9)
PLATELET # BLD: 255 E9/L (ref 130–450)
PMV BLD AUTO: 10 FL (ref 7–12)
POTASSIUM REFLEX MAGNESIUM: 4.5 MMOL/L (ref 3.5–5)
PRO-BNP: 267 PG/ML (ref 0–450)
PROTEIN UA: NEGATIVE MG/DL
RBC # BLD: 4.26 E12/L (ref 3.5–5.5)
RBC UA: ABNORMAL /HPF (ref 0–2)
SODIUM BLD-SCNC: 135 MMOL/L (ref 132–146)
SPECIFIC GRAVITY UA: 1.01 (ref 1–1.03)
TOTAL PROTEIN: 6.9 G/DL (ref 6.4–8.3)
TROPONIN, HIGH SENSITIVITY: 7 NG/L (ref 0–9)
UROBILINOGEN, URINE: 0.2 E.U./DL
WBC # BLD: 7.7 E9/L (ref 4.5–11.5)
WBC UA: ABNORMAL /HPF (ref 0–5)

## 2022-05-09 PROCEDURE — 85025 COMPLETE CBC W/AUTO DIFF WBC: CPT

## 2022-05-09 PROCEDURE — 83690 ASSAY OF LIPASE: CPT

## 2022-05-09 PROCEDURE — 80053 COMPREHEN METABOLIC PANEL: CPT

## 2022-05-09 PROCEDURE — 96374 THER/PROPH/DIAG INJ IV PUSH: CPT

## 2022-05-09 PROCEDURE — 74176 CT ABD & PELVIS W/O CONTRAST: CPT

## 2022-05-09 PROCEDURE — 99285 EMERGENCY DEPT VISIT HI MDM: CPT

## 2022-05-09 PROCEDURE — 83605 ASSAY OF LACTIC ACID: CPT

## 2022-05-09 PROCEDURE — 6360000002 HC RX W HCPCS: Performed by: STUDENT IN AN ORGANIZED HEALTH CARE EDUCATION/TRAINING PROGRAM

## 2022-05-09 PROCEDURE — 6370000000 HC RX 637 (ALT 250 FOR IP): Performed by: STUDENT IN AN ORGANIZED HEALTH CARE EDUCATION/TRAINING PROGRAM

## 2022-05-09 PROCEDURE — 84484 ASSAY OF TROPONIN QUANT: CPT

## 2022-05-09 PROCEDURE — 2580000003 HC RX 258: Performed by: STUDENT IN AN ORGANIZED HEALTH CARE EDUCATION/TRAINING PROGRAM

## 2022-05-09 PROCEDURE — 70450 CT HEAD/BRAIN W/O DYE: CPT

## 2022-05-09 PROCEDURE — 71045 X-RAY EXAM CHEST 1 VIEW: CPT

## 2022-05-09 PROCEDURE — 81001 URINALYSIS AUTO W/SCOPE: CPT

## 2022-05-09 PROCEDURE — 96361 HYDRATE IV INFUSION ADD-ON: CPT

## 2022-05-09 PROCEDURE — 36415 COLL VENOUS BLD VENIPUNCTURE: CPT

## 2022-05-09 PROCEDURE — 93005 ELECTROCARDIOGRAM TRACING: CPT | Performed by: STUDENT IN AN ORGANIZED HEALTH CARE EDUCATION/TRAINING PROGRAM

## 2022-05-09 PROCEDURE — 83880 ASSAY OF NATRIURETIC PEPTIDE: CPT

## 2022-05-09 PROCEDURE — 93010 ELECTROCARDIOGRAM REPORT: CPT | Performed by: INTERNAL MEDICINE

## 2022-05-09 RX ORDER — 0.9 % SODIUM CHLORIDE 0.9 %
500 INTRAVENOUS SOLUTION INTRAVENOUS ONCE
Status: COMPLETED | OUTPATIENT
Start: 2022-05-09 | End: 2022-05-09

## 2022-05-09 RX ORDER — ACETAMINOPHEN 325 MG/1
650 TABLET ORAL ONCE
Status: COMPLETED | OUTPATIENT
Start: 2022-05-09 | End: 2022-05-09

## 2022-05-09 RX ORDER — ONDANSETRON 2 MG/ML
4 INJECTION INTRAMUSCULAR; INTRAVENOUS ONCE
Status: COMPLETED | OUTPATIENT
Start: 2022-05-09 | End: 2022-05-09

## 2022-05-09 RX ADMIN — ONDANSETRON 4 MG: 2 INJECTION INTRAMUSCULAR; INTRAVENOUS at 08:48

## 2022-05-09 RX ADMIN — SODIUM CHLORIDE 500 ML: 9 INJECTION, SOLUTION INTRAVENOUS at 08:43

## 2022-05-09 RX ADMIN — ACETAMINOPHEN 650 MG: 325 TABLET ORAL at 11:39

## 2022-05-09 ASSESSMENT — PAIN DESCRIPTION - ORIENTATION: ORIENTATION: LEFT

## 2022-05-09 ASSESSMENT — ENCOUNTER SYMPTOMS
BACK PAIN: 0
DIARRHEA: 0
NAUSEA: 0
VOMITING: 0
SINUS PRESSURE: 0
COUGH: 0
SHORTNESS OF BREATH: 0
EYE REDNESS: 0
EYE DISCHARGE: 0
EYE PAIN: 0
ABDOMINAL DISTENTION: 0
SORE THROAT: 0
WHEEZING: 0

## 2022-05-09 ASSESSMENT — PAIN - FUNCTIONAL ASSESSMENT: PAIN_FUNCTIONAL_ASSESSMENT: NONE - DENIES PAIN

## 2022-05-09 ASSESSMENT — PAIN SCALES - GENERAL
PAINLEVEL_OUTOF10: 6
PAINLEVEL_OUTOF10: 4

## 2022-05-09 ASSESSMENT — PAIN DESCRIPTION - DESCRIPTORS: DESCRIPTORS: ACHING

## 2022-05-09 ASSESSMENT — PAIN DESCRIPTION - LOCATION: LOCATION: SHOULDER

## 2022-05-09 NOTE — ED PROVIDER NOTES
[facility]  Department of Emergency Medicine     Written by: Cristian Henson DO  Patient Name: Sylvia West  Attending Provider: Katharine Alfaro DO  Admit Date: 2022  7:49 AM  MRN: [id number]                   : 1940        Chief Complaint   Patient presents with    Dizziness     Dx with PE lung last week    Nausea    - Chief complaint    Patient is a 81-year-old female past medical history of seizures, hypothyroidism, hypertension, hyperlipidemia and PE anticoagulated on Eliquis. Patient presents with chief complaint of left flank pain as well as nausea vomiting and dizziness. Patient states that symptoms began earlier this morning. Patient states that she woke up and had left-sided flank pain. She describes the pain as a dull aching sensation. She currently rates pain a 4 out of 10. In addition patient notes that she has mild chest tightness and nausea and dizziness. Patient describes her dizziness as a lightheaded sensation. Patient denies any exacerbating relieving factors. Stated symptoms have been constant since onset. Patient denies any similar episodes in the past.  Patient states that she was recently diagnosed with a PE and has been compliant with her Eliquis. Patient denies any headache, numbness, tingling, Francisco pain, constipation or diarrhea. The history is provided by the patient. No  was used. Review of Systems   Constitutional: Negative for chills and fever. HENT: Negative for ear pain, sinus pressure and sore throat. Eyes: Negative for pain, discharge and redness. Respiratory: Negative for cough, shortness of breath and wheezing. Cardiovascular: Negative for chest pain. Gastrointestinal: Negative for abdominal distention, diarrhea, nausea and vomiting. Genitourinary: Positive for flank pain. Negative for dysuria and frequency. Musculoskeletal: Negative for arthralgias and back pain.    Skin: Negative for rash and wound. Neurological: Negative for weakness and headaches. Hematological: Negative for adenopathy. All other systems reviewed and are negative. Physical Exam  Vitals and nursing note reviewed. Constitutional:       General: She is not in acute distress. Appearance: Normal appearance. HENT:      Head: Normocephalic and atraumatic. Nose: No congestion or rhinorrhea. Mouth/Throat:      Mouth: Mucous membranes are moist.      Pharynx: Oropharynx is clear. Eyes:      Extraocular Movements: Extraocular movements intact. Pupils: Pupils are equal, round, and reactive to light. Cardiovascular:      Rate and Rhythm: Normal rate and regular rhythm. Heart sounds: No murmur heard. No gallop. Pulmonary:      Effort: Pulmonary effort is normal. No respiratory distress. Breath sounds: No wheezing, rhonchi or rales. Abdominal:      General: Abdomen is flat. Palpations: Abdomen is soft. There is no mass. Tenderness: There is no abdominal tenderness. There is left CVA tenderness. There is no guarding. Hernia: No hernia is present. Musculoskeletal:         General: No swelling, tenderness or signs of injury. Normal range of motion. Cervical back: Normal range of motion. No rigidity. No muscular tenderness. Skin:     General: Skin is warm and dry. Capillary Refill: Capillary refill takes less than 2 seconds. Neurological:      General: No focal deficit present. Mental Status: She is alert and oriented to person, place, and time. Mental status is at baseline. Comments: On neurological assessment no focal deficits, pupils equal round reactive to light, extraocular eye movements intact, muscle strength 5 out of 5 to bilateral upper and lower extremities, sensation intact light touch.    Psychiatric:         Mood and Affect: Mood normal.         Behavior: Behavior normal.          Procedures   EKG #1:  Interpreted by emergency department physician unless otherwise noted. Time:  0822   Rate: 70  Rhythm: Sinus. Interpretation: EKG obtained demonstrates sinus rhythm first-degree AV block, rate 70, normal axis, , no acute ST segment changes. .  Comparison: stable as compared to patient's most recent EKG. MDM  Number of Diagnoses or Management Options  Dehydration  Left flank pain  Diagnosis management comments: Patient is an 78-year-old female past medical history of seizures, hypothyroidism, hypertension, hyperlipidemia PE anticoagulated on Eliquis. Patient with chief complaint of nausea, dizziness and left flank pain. Vital signs stable presentation. On physical exam heart regular rate and rhythm, lungs clear to auscultation bilaterally, abdomen soft nontender. On neurological exam no focal deficits, pupils equal round reactive to light, extraocular eye movements are intact, sensation is intact to light touch. EKG obtained demonstrate no acute ischemic changes. Laboratory work obtained CBC unremarkable CMP demonstrated no acute abnormalities, lactic acid 1.2, lipase 13, troponin 7, proBNP 267, urinalysis is obtained and was not indicative infection. CT scan of the head demonstrated no acute abnormalities, chest x-ray negative. CT scan of the abdomen pelvis demonstrated no acute abnormalities, stable lumbar fusion. Patient given IV fluids, Zofran and Tylenol on reevaluation symptoms have resolved. Findings consistent with dizziness and nausea likely secondary to dehydration. Decision made to discharge patient. Findings discussed with patient and son at the bedside, all questions were answered, patient was instructed to follow-up with primary care doctor soon as possible. In addition if patient notes any new or worrisome symptoms she was instructed to return to the emergency department for evaluation.   Plan of care discussed with patient including discharge, all questions were answered, patient was in agreement plan of care and discharged home in stable condition. Amount and/or Complexity of Data Reviewed  Clinical lab tests: ordered and reviewed  Tests in the radiology section of CPT®: ordered and reviewed  Decide to obtain previous medical records or to obtain history from someone other than the patient: yes    Risk of Complications, Morbidity, and/or Mortality  Presenting problems: moderate  Diagnostic procedures: moderate  Management options: moderate    Patient Progress  Patient progress: stable             --------------------------------------------- PAST HISTORY ---------------------------------------------  Past Medical History:  has a past medical history of Anxiety and depression, Arthritis, Asthma, Chronic back pain, Chronic kidney disease (CKD), stage II (mild), Chronic osteomyelitis of lumbar spine ([address] Foot pain, HTN (hypertension), benign, Hx of blood clots, Hx of migraines, Hyperlipidemia, Hypothyroidism, Hypothyroidism, Neuropathy of leg, Other postoperative infection, Radiculopathy, lumbar region, Seizures ([address] Sleeping difficulties, Spinal stenosis of lumbar region, Synovial cyst of lumbar spine, Thrombocytopenia ([address] Tremors of nervous system, Vitamin D deficiency, and Wears dentures. Past Surgical History:  has a past surgical history that includes lumbar laminectomy (); cervical fusion (?); joint replacement (); Spine surgery ( & ); Spine surgery (); Spine surgery (); Tonsillectomy; Colonoscopy;  section; Cholecystectomy ('s); eye surgery (Bilateral, ); back surgery; Spine surgery (); Knee Arthroplasty (Right, 2017); pr egd transoral biopsy single/multiple (N/A, 6/15/2018); and pr colonoscopy flx dx w/collj spec when pfrmd (N/A, 6/15/2018). Social History:  reports that she has never smoked. She has never used smokeless tobacco. She reports that she does not drink alcohol and does not use drugs.     Family History: family history includes Cirrhosis in her brother; Heart Attack in her father; Other in her mother. The patients home medications have been reviewed.     Allergies: Albuterol, Iodine, Vistaril [hydroxyzine hcl], Gabapentin, Lyrica [pregabalin], and Skelaxin [metaxalone]    -------------------------------------------------- RESULTS -------------------------------------------------  Labs:  Results for orders placed or performed during the hospital encounter of 05/09/22   CBC with Auto Differential   Result Value Ref Range    WBC 7.7 4.5 - 11.5 E9/L    RBC 4.26 3.50 - 5.50 E12/L    Hemoglobin 12.7 11.5 - 15.5 g/dL    Hematocrit 39.2 34.0 - 48.0 %    MCV 92.0 80.0 - 99.9 fL    MCH 29.8 26.0 - 35.0 pg    MCHC 32.4 32.0 - 34.5 %    RDW 13.2 11.5 - 15.0 fL    Platelets 414 406 - 955 E9/L    MPV 10.0 7.0 - 12.0 fL    Neutrophils % 61.1 43.0 - 80.0 %    Immature Granulocytes % 0.4 0.0 - 5.0 %    Lymphocytes % 25.3 20.0 - 42.0 %    Monocytes % 9.2 2.0 - 12.0 %    Eosinophils % 3.0 0.0 - 6.0 %    Basophils % 1.0 0.0 - 2.0 %    Neutrophils Absolute 4.73 1.80 - 7.30 E9/L    Immature Granulocytes # 0.03 E9/L    Lymphocytes Absolute 1.96 1.50 - 4.00 E9/L    Monocytes Absolute 0.71 0.10 - 0.95 E9/L    Eosinophils Absolute 0.23 0.05 - 0.50 E9/L    Basophils Absolute 0.08 0.00 - 0.20 E9/L   Comprehensive Metabolic Panel w/ Reflex to MG   Result Value Ref Range    Sodium 135 132 - 146 mmol/L    Potassium reflex Magnesium 4.5 3.5 - 5.0 mmol/L    Chloride 104 98 - 107 mmol/L    CO2 23 22 - 29 mmol/L    Anion Gap 8 7 - 16 mmol/L    Glucose 140 (H) 74 - 99 mg/dL    BUN 18 6 - 23 mg/dL    CREATININE 0.8 0.5 - 1.0 mg/dL    GFR Non-African American >60 >=60 mL/min/1.73    GFR African American >60     Calcium 9.3 8.6 - 10.2 mg/dL    Total Protein 6.9 6.4 - 8.3 g/dL    Albumin 3.5 3.5 - 5.2 g/dL    Total Bilirubin 0.3 0.0 - 1.2 mg/dL    Alkaline Phosphatase 176 (H) 35 - 104 U/L    ALT 24 0 - 32 U/L    AST 24 0 - 31 U/L   Lipase   Result Value Ref Range    Lipase 13 13 - 60 U/L   Troponin   Result Value Ref Range    Troponin, High Sensitivity 7 0 - 9 ng/L   Brain Natriuretic Peptide   Result Value Ref Range    Pro- 0 - 450 pg/mL   Urinalysis with Microscopic   Result Value Ref Range    Color, UA Yellow Straw/Yellow    Clarity, UA Clear Clear    Glucose, Ur Negative Negative mg/dL    Bilirubin Urine Negative Negative    Ketones, Urine Negative Negative mg/dL    Specific Gravity, UA 1.010 1.005 - 1.030    Blood, Urine Negative Negative    pH, UA 7.0 5.0 - 9.0    Protein, UA Negative Negative mg/dL    Urobilinogen, Urine 0.2 <2.0 E.U./dL    Nitrite, Urine Negative Negative    Leukocyte Esterase, Urine TRACE (A) Negative    WBC, UA 0-1 0 - 5 /HPF    RBC, UA NONE 0 - 2 /HPF    Bacteria, UA NONE SEEN None Seen /HPF   Lactic Acid   Result Value Ref Range    Lactic Acid 1.2 0.5 - 2.2 mmol/L   EKG 12 Lead   Result Value Ref Range    Ventricular Rate 70 BPM    Atrial Rate 70 BPM    P-R Interval 212 ms    QRS Duration 82 ms    Q-T Interval 400 ms    QTc Calculation (Bazett) 432 ms    P Axis 46 degrees    R Axis 27 degrees    T Axis 42 degrees       Radiology:  CT Head WO Contrast   Final Result   No acute intracranial abnormality. CT ABDOMEN PELVIS WO CONTRAST Additional Contrast? None   Final Result   1. Lung bases are clear. No acute inflammatory changes in the left abdomen. No evidence of renal obstruction. 2.  Sigmoid diverticulosis without diverticulitis. 3.  No free air or abscess detected. 4.  Small amount of gas in the urinary bladder could reflect cystitis in the   proper clinical setting. 5.  Previous metallic fusion of L4 through S1 with significant degenerative   disc disease at L2-3 and L3-4.          XR CHEST PORTABLE   Final Result   Normal chest             ------------------------- NURSING NOTES AND VITALS REVIEWED ---------------------------  Date / Time Roomed:  5/9/2022  7:49 AM  ED Bed Assignment:  27/27    The nursing notes within the ED encounter and vital signs as below have been reviewed. BP (!) 107/59   Pulse 70   Temp 98 °F (36.7 °C) (Oral)   Resp 18   Ht 5' (1.524 m)   Wt 173 lb (78.5 kg)   SpO2 95%   BMI 33.79 kg/m²   Oxygen Saturation Interpretation: Normal      ------------------------------------------ PROGRESS NOTES ------------------------------------------  3:41 PM EDT  I have spoken with the patient and discussed todays results, in addition to providing specific details for the plan of care and counseling regarding the diagnosis and prognosis. Their questions are answered at this time and they are agreeable with the plan. I discussed at length with them reasons for immediate return here for re evaluation. They will followup with their primary care physician by calling their office tomorrow. --------------------------------- ADDITIONAL PROVIDER NOTES ---------------------------------  At this time the patient is without objective evidence of an acute process requiring hospitalization or inpatient management. They have remained hemodynamically stable throughout their entire ED visit and are stable for discharge with outpatient follow-up. The plan has been discussed in detail and they are aware of the specific conditions for emergent return, as well as the importance of follow-up. Discharge Medication List as of 5/9/2022 12:46 PM          Diagnosis:  1. Dehydration    2. Left flank pain        Disposition:  Patient's disposition: Discharge to home  Patient's condition is stable. Patient was seen and evaluated by myself and my attending Starr Jaramillo DO. Assessment and Plan discussed with attending provider, please see attestation for final plan of care.      DO Dejah Nuñez DO  Resident  05/09/22 [id number]

## 2022-05-11 PROBLEM — G43.909 MIGRAINE HEADACHE: Status: ACTIVE | Noted: 2022-05-11

## 2022-05-11 PROBLEM — K21.9 GASTROESOPHAGEAL REFLUX DISEASE: Status: ACTIVE | Noted: 2022-05-11

## 2022-05-11 PROBLEM — E78.00 HYPERCHOLESTEROLEMIA: Status: ACTIVE | Noted: 2022-05-11

## 2022-05-11 PROBLEM — J44.9 CHRONIC OBSTRUCTIVE PULMONARY DISEASE (HCC): Status: ACTIVE | Noted: 2022-05-11

## 2022-05-11 PROBLEM — I38 HEART VALVE DISEASE: Status: ACTIVE | Noted: 2022-05-11

## 2022-05-11 PROBLEM — D64.9 ANEMIA: Status: ACTIVE | Noted: 2022-05-11

## 2022-06-02 NOTE — DISCHARGE SUMMARY
[facility]   Discharge summary   Patient ID:  Milka Wallace  [id number]  81 y.o.  1940    Admit date: 4/28/2022    Discharge date and time: 5/2/2022    Admission Diagnoses:   Patient Active Problem List   Diagnosis    HTN (hypertension), benign    Asthma    History of DVT (deep vein thrombosis)    Chest pain    [address] [facility]) [address] [address]    CKD (chronic kidney disease) stage 2, GFR 60-89 ml/min    Acquired hypothyroidism    History of epilepsy    Acute metabolic encephalopathy    RSV (respiratory syncytial virus pneumonia)    Obesity (BMI 30-39. 9)    Bilateral pulmonary embolism (HCC)    Pulmonary embolism without acute cor pulmonale (HCC)    Pulmonary embolism, bilateral (HCC)    Hx of pulmonary embolus    Chronic obstructive pulmonary disease (HCC)    Anemia    Gastroesophageal reflux disease    Heart valve disease    Hypercholesterolemia    Migraine headache       Discharge Diagnoses:     Consults: pulmonary/intensive care    Procedures: None    Hospital Course: The patient is a 81 y.o. female of Eladio Stallings MD        81year-old with a history of DVT/PEs is admitted to telemetry unit with     Unprovoked bilateral PE's  Lovenox 1 mg/kg twice daily-OAC upon discharge  Ambulating pulse ox in a.m. ISP  Echo - no evidence of right heart strain -will monitor patient  Consult pulmonary-appreciate input  Patient not requiring any o2 needs at this time. Patient transitioned to Eliquis. Bilateral lower extremities negative for DVT. Patient to follow up with pulmonary in three months. No results for input(s): WBC, HGB, HCT, PLT in the last 72 hours. No results for input(s): NA, K, CL, CO2, BUN, CREATININE, GLU, CALCIUM in the last 72 hours.     CT ABDOMEN PELVIS WO CONTRAST Additional Contrast? None    Result Date: 5/9/2022  EXAMINATION: CT OF THE ABDOMEN AND PELVIS WITHOUT CONTRAST 5/9/2022 9:03 am TECHNIQUE: CT of the abdomen and pelvis was performed without the administration of intravenous contrast. Multiplanar reformatted images are provided for review. Dose modulation, iterative reconstruction, and/or weight based adjustment of the mA/kV was utilized to reduce the radiation dose to as low as reasonably achievable. COMPARISON: None. HISTORY: ORDERING SYSTEM PROVIDED HISTORY: left flank pain TECHNOLOGIST PROVIDED HISTORY: Reason for exam:->left flank pain Additional Contrast?->None Decision Support Exception - unselect if not a suspected or confirmed emergency medical condition->Emergency Medical Condition (MA) FINDINGS: Lower Chest:  Visualized portion of the lower chest demonstrates no acute abnormality. Organs: Liver and spleen are normal in size without focal lesion. Clips are noted in the gallbladder fossa. There is near complete fatty replacement of the pancreas. Slight hazy density in the central small bowel mesentery may represent sclerosing mesenteritis. The adrenal glands are normal.  Kidneys demonstrate minimal atrophy. No renal obstruction. No renal calculi. No ureteral obstruction or calculus. GI/Bowel: No evidence of bowel obstruction. Left colon diverticulosis, no evidence of diverticulitis. No evidence of mesenteric adenopathy or mass. The appendix is not directly visualized. Pelvis: Uterus and ovaries appear normal.  Urinary bladder contains a small amount of gas but no wall thickening or calculus. Peritoneum/Retroperitoneum: No retroperitoneal mass or adenopathy identified. Aorta is nonaneurysmal. Bones/Soft Tissues: Subcutaneous structures are generally normal.  Tiny left inguinal fat containing hernia. Previous posterior metallic fusion of L4 through S1 with interbody spacers. Moderate to severe degenerative disc disease at L3-4.     1.  Lung bases are clear. No acute inflammatory changes in the left abdomen. No evidence of renal obstruction. 2.  Sigmoid diverticulosis without diverticulitis. 3.  No free air or abscess detected. 4.  Small amount of gas in the urinary bladder could reflect cystitis in the proper clinical setting. 5.  Previous metallic fusion of L4 through S1 with significant degenerative disc disease at L2-3 and L3-4. CT Head WO Contrast    Result Date: 5/9/2022  EXAMINATION: CT OF THE HEAD WITHOUT CONTRAST  5/9/2022 9:03 am TECHNIQUE: CT of the head was performed without the administration of intravenous contrast. Dose modulation, iterative reconstruction, and/or weight based adjustment of the mA/kV was utilized to reduce the radiation dose to as low as reasonably achievable. COMPARISON: None. HISTORY: ORDERING SYSTEM PROVIDED HISTORY: dizziness TECHNOLOGIST PROVIDED HISTORY: Reason for exam:->dizziness Has a \"code stroke\" or \"stroke alert\" been called? ->No Decision Support Exception - unselect if not a suspected or confirmed emergency medical condition->Emergency Medical Condition (MA) FINDINGS: BRAIN/VENTRICLES: There is no acute intracranial hemorrhage, mass effect or midline shift. No abnormal extra-axial fluid collection. The gray-white differentiation is maintained without evidence of an acute infarct. There is no evidence of hydrocephalus. There is mild chronic ischemic/degenerative changes in the white matter. ORBITS: The visualized portion of the orbits demonstrate no acute abnormality. SINUSES: There is significant cortical thickening of the maxillary sinuses bilaterally are compatible with chronic sinusitis. Right maxillary sinus is also nearly completely opacified. There is some mucosal thickening in the right left ethmoid air cells. [place] SOFT TISSUES/SKULL:  No acute abnormality of the visualized skull or soft tissues. No acute intracranial abnormality.      XR CHEST PORTABLE    Result Date: 5/9/2022  EXAMINATION: ONE XRAY VIEW OF THE CHEST 5/9/2022 8:28 am COMPARISON: 04/28/2022 HISTORY: ORDERING SYSTEM PROVIDED HISTORY: chest pain, dizziness TECHNOLOGIST PROVIDED HISTORY: Reason for exam:->chest pain, dizziness FINDINGS: Heart size is normal.  There are no infiltrates or effusions. Normal chest       Discharge Exam:    HEENT: NCAT,  PERRLA, No JVD  Heart:  RRR, no murmurs, gallops, or rubs. Lungs:  CTA bilaterally, no wheeze, rales or rhonchi  Abd: bowel sounds present, nontender, nondistended, no masses  Extrem:  No clubbing, cyanosis, or edema    Disposition: home     Patient Condition at Discharge: Stable    Patient Instructions:      Medication List      CHANGE how you take these medications    apixaban 5 MG Tabs tablet  Commonly known as: ELIQUIS  Take 2 tablets by mouth 2 times daily for 12 doses  What changed: Another medication with the same name was removed. Continue taking this medication, and follow the directions you see here.      diazePAM 10 MG tablet  Commonly known as: VALIUM  Take 1 tablet by mouth every 12 hours as needed for Anxiety  What changed: when to take this     propranolol 20 MG tablet  Commonly known as: INDERAL  1 in am and evening and 1.5 in afternoon  What changed:   · how much to take  · how to take this  · when to take this  · additional instructions        CONTINUE taking these medications    atorvastatin 10 MG tablet  Commonly known as: LIPITOR  Take 1 tablet by mouth nightly     furosemide 40 MG tablet  Commonly known as: LASIX     levothyroxine 25 MCG tablet  Commonly known as: SYNTHROID     losartan 25 MG tablet  Commonly known as: COZAAR  Take 1 tablet by mouth daily     omeprazole 40 MG delayed release capsule  Commonly known as: PRILOSEC     oxyCODONE 15 MG immediate release tablet  Commonly known as: OXY-IR     phenytoin 100 MG ER capsule  Commonly known as: DILANTIN  Take 1 capsule by mouth 2 times daily     potassium chloride 20 MEQ extended release tablet  Commonly known as: KLOR-CON M        STOP taking these medications    doxepin 10 MG capsule  Commonly known as: SINEQUAN     fluticasone-salmeterol 250-50 MCG/DOSE Aepb  Commonly known as: Advair Diskus hydroCHLOROthiazide 25 MG tablet  Commonly known as: HYDRODIURIL     melatonin 3 mg Tabs tablet     topiramate 25 MG tablet  Commonly known as: TOPAMAX          Activity: activity as tolerated  Diet: cardiac diet    Pt has been advised to: Follow-up with Marvin Mascorro MD in 1 week.   Follow-up with consultants as recommended by them    Note that over 30 minutes was spent in preparing discharge papers, discussing discharge with patient, medication review, etc.    Signed:  Stephania Partida MD  5/2/2022

## 2022-07-01 ENCOUNTER — OFFICE VISIT (OUTPATIENT)
Dept: CARDIOLOGY CLINIC | Age: 82
End: 2022-07-01
Payer: MEDICARE

## 2022-07-01 VITALS
WEIGHT: 178 LBS | RESPIRATION RATE: 14 BRPM | SYSTOLIC BLOOD PRESSURE: 112 MMHG | BODY MASS INDEX: 34.95 KG/M2 | HEIGHT: 60 IN | HEART RATE: 74 BPM | DIASTOLIC BLOOD PRESSURE: 62 MMHG

## 2022-07-01 DIAGNOSIS — I10 HTN (HYPERTENSION), BENIGN: ICD-10-CM

## 2022-07-01 DIAGNOSIS — I26.99 BILATERAL PULMONARY EMBOLISM (HCC): ICD-10-CM

## 2022-07-01 DIAGNOSIS — Z86.718 HISTORY OF DVT (DEEP VEIN THROMBOSIS): Chronic | ICD-10-CM

## 2022-07-01 DIAGNOSIS — K21.9 GASTROESOPHAGEAL REFLUX DISEASE WITHOUT ESOPHAGITIS: ICD-10-CM

## 2022-07-01 DIAGNOSIS — R60.0 LEG EDEMA: Primary | ICD-10-CM

## 2022-07-01 DIAGNOSIS — I20.8 OTHER FORMS OF ANGINA PECTORIS (HCC): ICD-10-CM

## 2022-07-01 DIAGNOSIS — R60.0 LEG EDEMA: ICD-10-CM

## 2022-07-01 DIAGNOSIS — G43.019 INTRACTABLE MIGRAINE WITHOUT AURA AND WITHOUT STATUS MIGRAINOSUS: ICD-10-CM

## 2022-07-01 DIAGNOSIS — Z86.69 HISTORY OF EPILEPSY: Chronic | ICD-10-CM

## 2022-07-01 DIAGNOSIS — N18.2 CKD (CHRONIC KIDNEY DISEASE) STAGE 2, GFR 60-89 ML/MIN: Chronic | ICD-10-CM

## 2022-07-01 DIAGNOSIS — I26.99 PULMONARY EMBOLISM, BILATERAL (HCC): ICD-10-CM

## 2022-07-01 DIAGNOSIS — E66.9 OBESITY (BMI 30-39.9): Chronic | ICD-10-CM

## 2022-07-01 DIAGNOSIS — J41.8 MIXED SIMPLE AND MUCOPURULENT CHRONIC BRONCHITIS (HCC): ICD-10-CM

## 2022-07-01 DIAGNOSIS — E78.00 HYPERCHOLESTEROLEMIA: ICD-10-CM

## 2022-07-01 DIAGNOSIS — Z86.711 HX OF PULMONARY EMBOLUS: ICD-10-CM

## 2022-07-01 LAB
ANION GAP SERPL CALCULATED.3IONS-SCNC: 11 MMOL/L (ref 7–16)
BUN BLDV-MCNC: 25 MG/DL (ref 6–23)
CALCIUM SERPL-MCNC: 9.2 MG/DL (ref 8.6–10.2)
CHLORIDE BLD-SCNC: 103 MMOL/L (ref 98–107)
CO2: 23 MMOL/L (ref 22–29)
CREAT SERPL-MCNC: 1.1 MG/DL (ref 0.5–1)
GFR AFRICAN AMERICAN: 58
GFR NON-AFRICAN AMERICAN: 48 ML/MIN/1.73
GLUCOSE BLD-MCNC: 118 MG/DL (ref 74–99)
POTASSIUM SERPL-SCNC: 4.2 MMOL/L (ref 3.5–5)
PRO-BNP: 68 PG/ML (ref 0–450)
SODIUM BLD-SCNC: 137 MMOL/L (ref 132–146)

## 2022-07-01 PROCEDURE — 99214 OFFICE O/P EST MOD 30 MIN: CPT | Performed by: INTERNAL MEDICINE

## 2022-07-01 PROCEDURE — 1123F ACP DISCUSS/DSCN MKR DOCD: CPT | Performed by: INTERNAL MEDICINE

## 2022-07-01 PROCEDURE — 36415 COLL VENOUS BLD VENIPUNCTURE: CPT | Performed by: INTERNAL MEDICINE

## 2022-07-01 PROCEDURE — 93000 ELECTROCARDIOGRAM COMPLETE: CPT | Performed by: INTERNAL MEDICINE

## 2022-07-01 RX ORDER — LOSARTAN POTASSIUM 50 MG/1
TABLET ORAL
COMMUNITY
Start: 2022-06-06 | End: 2022-07-01

## 2022-07-01 NOTE — PROGRESS NOTES
Pt was in for lab work. Labs were drawn from right forearm. Patient tolerated procedure well.     Codey Myrick, AMMYA

## 2022-07-01 NOTE — PROGRESS NOTES
Out Patient CARDIOLOGY Follow Up Visit    Name: Clare Coronel    Age: 81 y.o. Date of Admission: No admission date for patient encounter. Date of Service: 8/3/2022    Reason for Consultation:   Chief Complaint   Patient presents with    Follow-Up from Hospital          Referring Physician: No admitting provider for patient encounter. History of Present Illness: 71-year-old and Dominican-speaking with history of anxiety/depression, osteoarthritis, asthma, chronic back pain, chronic kidney disease, chronic osteomyelitis of the lumbar spine, hypertension, hyperlipidemia, hypothyroidism, and prior DVT/PE discharged from the hospital on April 27, 2022 with PE. He presented to the emergency room again on April 28, 2022 with chest pains and shortness of breath and again noted to have bilateral pulmonary embolism without RV strain after she has missed doses of her anticoagulation. She presented for follow-up visit today and overall reports doing fine and states chest pain has resolved. She denies orthopnea or paroxysmal nocturnal dyspnea. She reported mild gravity dependent lower extremity edema and is advised to use compression stockings as well as following low-salt diet. Follow-up in 3 months for close monitoring. Past Medical History:  Past Medical History:   Diagnosis Date    Anxiety and depression 7/13/2016    Arthritis     osteo    Asthma     has chronic couch    Chronic back pain     Chronic kidney disease (CKD), stage II (mild) 04/19/2012    Creatinine around 1.3 to 1.4 in April 2012 secondary to IV antibiotics following back surgery.     Chronic osteomyelitis of lumbar spine ([facility] [address]     note on chart from Dr. Gabriela Javier dated 8/17/2017    Foot pain     after back surgery / chronic    HTN (hypertension), benign 1/10/2015    Hx of blood clots     2011 / DVT, PE    Hx of migraines     Hyperlipidemia     Hypothyroidism     Hypothyroidism 7/13/2016    Neuropathy of leg     bilateral    Other postoperative infection 2011    Was on Vancomycin following the surgery for the removal of lumbar cysts. Patient on antibiotics for life     Radiculopathy, lumbar region 2012    Seizures ([address]     last seizure 2016 / gran mal    Sleeping difficulties     takes ambien    Spinal stenosis of lumbar region     Synovial cyst of lumbar spine 2011    Thrombocytopenia ([address] 2016    follows only with PCP    Tremors of nervous system     hands    Vitamin D deficiency     Wears dentures     upper       Past Surgical History:  Past Surgical History:   Procedure Laterality Date    BACK SURGERY      multiple    CERVICAL FUSION  ?  SECTION      CHOLECYSTECTOMY      lap    COLONOSCOPY      EYE SURGERY Bilateral     cataract extraction    JOINT REPLACEMENT  2010    left    KNEE ARTHROPLASTY Right 2017    Total right knee arthroplasty    LUMBAR LAMINECTOMY  1985    MS COLONOSCOPY FLX DX W/COLLJ SPEC WHEN PFRMD N/A 6/15/2018    COLONOSCOPY DIAGNOSTIC performed by Rachell Irizarry MD at [address] [place] [address] EGD TRANSORAL BIOPSY SINGLE/MULTIPLE N/A 6/15/2018    EGD ESOPHAGOGASTRODUODENOSCOPY performed by Rachell Irizarry MD at [address] [address]    Neck    SPINE SURGERY      synovial cyst    SPINE SURGERY  2012    rebuilt talibone    TONSILLECTOMY         Family History:  Family History   Problem Relation Age of Onset    Other Mother         cerebral hemorrhage, psychiatric problems    Heart Attack Father     Cirrhosis Brother        Social History:  Social History     Socioeconomic History    Marital status:       Spouse name: Not on file    Number of children: Not on file    Years of education: Not on file    Highest education level: Not on file   Occupational History    Not on file   Tobacco Use    Smoking status: Never    Smokeless tobacco: Never   Vaping Use    Vaping Use: Never used   Substance and Sexual Activity Alcohol use: No     Comment: rare    Drug use: No    Sexual activity: Not Currently   Other Topics Concern    Not on file   Social History Narrative    Not on file     Social Determinants of Health     Financial Resource Strain: Not on file   Food Insecurity: Not on file   Transportation Needs: Not on file   Physical Activity: Not on file   Stress: Not on file   Social Connections: Not on file   Intimate Partner Violence: Not on file   Housing Stability: Not on file       Allergies: Allergies   Allergen Reactions    Albuterol Hives     Other reaction(s): Anaphylaxis    Iodine Shortness Of Breath    Vistaril [Hydroxyzine Hcl] Shortness Of Breath    Gabapentin Other (See Comments)     lethargic  Other reaction(s): Other See Comments  lethargic    Lyrica [Pregabalin]      lethargic    Metaxalone Other (See Comments)     Patient becomes sedated after taking Skelaxin  Other reaction(s): Other See Comments  Patient becomes sedated after taking Skelaxin    Morphine      Other reaction(s): Confusion       Home Medications:  Prior to Admission medications    Medication Sig Start Date End Date Taking?  Authorizing Provider   apixaban (ELIQUIS) 5 MG TABS tablet Take 2 tablets by mouth 2 times daily for 12 doses 4/27/22 7/1/22 Yes GENARO Damon CNP   losartan (COZAAR) 25 MG tablet Take 1 tablet by mouth daily 4/28/22  Yes GENARO Damon CNP   furosemide (LASIX) 40 MG tablet Take 1 tablet by mouth daily 4/13/22  Yes Historical Provider, MD   omeprazole (PRILOSEC) 40 MG delayed release capsule Take 1 capsule by mouth daily  4/13/22  Yes Historical Provider, MD   propranolol (INDERAL) 20 MG tablet 1 in am and evening and 1.5 in afternoon  Patient taking differently: Take 20 mg by mouth 3 times daily 20 mg in am and evening and 30 mg in afternoon 2/14/22  Yes GENARO Stiles   phenytoin (DILANTIN) 100 MG ER capsule Take 1 capsule by mouth 2 times daily 2/14/22  Yes GENARO Stiles potassium chloride (KLOR-CON M) 20 MEQ extended release tablet Take 20 mEq by mouth 2 times daily    Yes Historical Provider, MD   oxyCODONE (OXY-IR) 15 MG immediate release tablet Take 15 mg by mouth every 6 hours as needed for Pain. Yes Historical Provider, MD   diazepam (VALIUM) 10 MG tablet Take 1 tablet by mouth every 12 hours as needed for Anxiety  Patient taking differently: Take 10 mg by mouth every 8 hours as needed for Anxiety.   9/10/17  Yes Catrina Connell MD   atorvastatin (LIPITOR) 10 MG tablet Take 1 tablet by mouth nightly 12/11/16  Yes Francis Hu MD   levothyroxine (SYNTHROID) 25 MCG tablet Take 25 mcg by mouth daily   Yes Historical Provider, MD       Current Medications:  Current Outpatient Medications   Medication Sig Dispense Refill    apixaban (ELIQUIS) 5 MG TABS tablet Take 2 tablets by mouth 2 times daily for 12 doses 60 tablet 1    losartan (COZAAR) 25 MG tablet Take 1 tablet by mouth daily 30 tablet 3    furosemide (LASIX) 40 MG tablet Take 1 tablet by mouth daily      omeprazole (PRILOSEC) 40 MG delayed release capsule Take 1 capsule by mouth daily       propranolol (INDERAL) 20 MG tablet 1 in am and evening and 1.5 in afternoon (Patient taking differently: Take 20 mg by mouth 3 times daily 20 mg in am and evening and 30 mg in afternoon) 310 tablet 3    phenytoin (DILANTIN) 100 MG ER capsule Take 1 capsule by mouth 2 times daily 180 capsule 3    potassium chloride (KLOR-CON M) 20 MEQ extended release tablet Take 20 mEq by mouth 2 times daily       oxyCODONE (OXY-IR) 15 MG immediate release tablet Take 15 mg by mouth every 6 hours as needed for Pain.      diazepam (VALIUM) 10 MG tablet Take 1 tablet by mouth every 12 hours as needed for Anxiety (Patient taking differently: Take 10 mg by mouth every 8 hours as needed for Anxiety. ) 30 tablet 0    atorvastatin (LIPITOR) 10 MG tablet Take 1 tablet by mouth nightly 30 tablet 0    levothyroxine (SYNTHROID) 25 MCG tablet Take 25 mcg by mouth daily       No current facility-administered medications for this visit. Review of Systems:   Cardiac: As per HPI  General: Denies fever or chills  Pulmonary: As per HPI  HEENT: Denies runny nose  GI: No complaints  : No complaints  Endocrine: Denies night sweats  Musculoskeletal: No complaints  Skin: Dry skin  Neuro: No complaints  Psych: Denies depression    Physical Exam:  /62   Pulse 74   Resp 14   Ht 5' (1.524 m)   Wt 178 lb (80.7 kg)   BMI 34.76 kg/m²   Wt Readings from Last 3 Encounters:   07/01/22 178 lb (80.7 kg)   05/16/22 172 lb 3.2 oz (78.1 kg)   05/09/22 173 lb (78.5 kg)       Appearance: Alert and oriented x3 not in acute distress. Skin: Dry skin  Head: Atraumatic  Eyes: Intact extraocular muscles   ENMT: Mucous membranes are moist  Neck: Supple  Lungs: Clear to auscultation  Cardiac: Normal S1 and S2  Abdomen: Protuberant  Extremities: Intact range of motion  Neurologic: No focal neurological deficits  Peripheral Pulses: 2+ peripheral pulses      Laboratory Tests:  No results for input(s): NA, K, CL, CO2, BUN, CREATININE, GLUCOSE, CALCIUM in the last 72 hours. Lab Results   Component Value Date/Time    MG 1.7 06/14/2018 04:22 AM    MG 1.9 10/31/2017 06:54 PM    MG 2.1 12/10/2016 09:05 PM     No results for input(s): ALKPHOS, ALT, AST, PROT, BILITOT, BILIDIR, LABALBU in the last 72 hours. No results for input(s): WBC, RBC, HGB, HCT, MCV, MCH, MCHC, RDW, PLT, MPV in the last 72 hours. Lab Results   Component Value Date    CKTOTAL 100 12/10/2016    CKMB 1.3 12/10/2016    TROPONINI <0.01 07/01/2019    TROPONINI <0.01 06/13/2018    TROPONINI <0.01 10/31/2017     No results for input(s): CKTOTAL, CKMB, CKMBINDEX, TROPHS in the last 72 hours.   Lab Results   Component Value Date    INR 1.2 04/28/2022    INR 1.1 06/15/2018    INR 1.1 06/14/2018    PROTIME 13.7 (H) 04/28/2022    PROTIME 12.8 (H) 06/15/2018    PROTIME 12.7 (H) 06/14/2018     Lab Results   Component Value Date TSH 2.240 04/29/2022    TSH 5.320 (H) 08/17/2016    TSH 12.230 (H) 05/03/2016     Lab Results   Component Value Date    LABA1C 5.5 05/03/2016    LABA1C 5.4 06/05/2015    LABA1C 5.6 01/23/2014     No results found for: EAG  Lab Results   Component Value Date    CHOL 207 (H) 12/11/2016    CHOL 195 05/03/2016    CHOL 166 01/21/2016     Lab Results   Component Value Date    TRIG 165 (H) 12/11/2016    TRIG 138 05/03/2016    TRIG 151 (H) 01/21/2016     Lab Results   Component Value Date    HDL 70 12/11/2016    HDL 60 05/03/2016    HDL 49 01/21/2016     Lab Results   Component Value Date    LDLCALC 104 (H) 12/11/2016    LDLCALC 107 (H) 05/03/2016    LDLCALC 87 01/21/2016     Lab Results   Component Value Date    LABVLDL 33 12/11/2016    LABVLDL 28 05/03/2016    LABVLDL 30 01/21/2016     No results found for: CHOLHDLRATIO  No results for input(s): PROBNP in the last 72 hours. Cardiac Tests:  EKG reviewed (EKG date: Sinus rhythm first-degree AV block 74 bpm nonspecific ST-T wave changes.):      Echocardiogram reviewed: 5/2/2022   Normal left ventricular chamber size. Normal left ventricular systolic function. Visually estimated LVEF is 55-60 %. No wall motion abnormalities. Indeterminate diastolic function. Normal right ventricle structure and function. Normal left atrial size. Normal right atrial size. No comparison study available. Unable to estimate PA pressure. No comparison study available. Stress test reviewed:      Cardiac catheterization reviewed:     CXR reviewed: The ASCVD Risk score (Li Ignacio et al., 2013) failed to calculate for the following reasons: The 2013 ASCVD risk score is only valid for ages 36 to 78    ASSESSMENT / PLAN:    1. Leg edema  Appears to be gravity related patient is advised to use compression stockings and elevate the legs while sitting and in the evening  Salt diet also recommended  - Basic Metabolic Panel;  Future  - Brain Natriuretic Peptide; Future    2. HTN (hypertension), benign  Stable  - Basic Metabolic Panel; Future  - Brain Natriuretic Peptide; Future    3. Bilateral pulmonary embolism (HCC)  Continue Eliquis and follow-up with pulmonology  We will discuss arranging for sinus rhythm 77 bpm nonspecific ST-T wave changes Zio patch heart monitor during next visit  4. Pulmonary embolism, bilateral ([address]  As mentioned above  5. Gastroesophageal reflux disease without esophagitis  With your PCP  6. Mixed simple and mucopurulent chronic bronchitis ([address]  Follow-up with your pulmonologist  7. CKD (chronic kidney disease) stage 2, GFR 60-89 ml/min  Follow-up with your PCP  8. History of DVT (deep vein thrombosis)  On Eliquis  9. History of epilepsy    10. Obesity (BMI 30-39. 9)  Weight loss is recommended  11. Other forms of angina pectoris ([address]  Denies any symptoms today  12. Hx of pulmonary embolus  As mentioned above  13. Hypercholesterolemia  Continue statin therapy  14. Intractable migraine without aura and without status migrainosus  Follow-up with your PCP       Follow in Return in about 3 months (around 10/1/2022). Thank you for allowing me to participate in your patient's care. Please feel free to contact me if you have any questions or concerns.     Nereyda Jean Baptiste MD  [facility]) Cardiology

## 2022-08-04 ENCOUNTER — TELEPHONE (OUTPATIENT)
Dept: ADMINISTRATIVE | Age: 82
End: 2022-08-04

## 2022-11-18 ENCOUNTER — OFFICE VISIT (OUTPATIENT)
Dept: CARDIOLOGY CLINIC | Age: 82
End: 2022-11-18
Payer: MEDICARE

## 2022-11-18 VITALS
BODY MASS INDEX: 34.51 KG/M2 | SYSTOLIC BLOOD PRESSURE: 124 MMHG | HEART RATE: 80 BPM | DIASTOLIC BLOOD PRESSURE: 60 MMHG | HEIGHT: 60 IN | RESPIRATION RATE: 18 BRPM | WEIGHT: 175.8 LBS

## 2022-11-18 DIAGNOSIS — E03.9 ACQUIRED HYPOTHYROIDISM: Chronic | ICD-10-CM

## 2022-11-18 DIAGNOSIS — I10 HTN (HYPERTENSION), BENIGN: ICD-10-CM

## 2022-11-18 DIAGNOSIS — I27.82 CHRONIC SEPTIC PULMONARY EMBOLISM WITHOUT ACUTE COR PULMONALE (HCC): ICD-10-CM

## 2022-11-18 DIAGNOSIS — J41.8 MIXED SIMPLE AND MUCOPURULENT CHRONIC BRONCHITIS (HCC): ICD-10-CM

## 2022-11-18 DIAGNOSIS — I26.99 BILATERAL PULMONARY EMBOLISM (HCC): ICD-10-CM

## 2022-11-18 DIAGNOSIS — N18.2 CKD (CHRONIC KIDNEY DISEASE) STAGE 2, GFR 60-89 ML/MIN: Chronic | ICD-10-CM

## 2022-11-18 DIAGNOSIS — I38 HEART VALVE DISEASE: ICD-10-CM

## 2022-11-18 DIAGNOSIS — Z86.718 HISTORY OF DVT (DEEP VEIN THROMBOSIS): Chronic | ICD-10-CM

## 2022-11-18 DIAGNOSIS — I26.90 CHRONIC SEPTIC PULMONARY EMBOLISM WITHOUT ACUTE COR PULMONALE (HCC): ICD-10-CM

## 2022-11-18 DIAGNOSIS — K21.9 GASTROESOPHAGEAL REFLUX DISEASE, UNSPECIFIED WHETHER ESOPHAGITIS PRESENT: ICD-10-CM

## 2022-11-18 DIAGNOSIS — R60.0 LEG EDEMA: Primary | ICD-10-CM

## 2022-11-18 DIAGNOSIS — Z86.711 HX OF PULMONARY EMBOLUS: ICD-10-CM

## 2022-11-18 PROCEDURE — 93000 ELECTROCARDIOGRAM COMPLETE: CPT | Performed by: INTERNAL MEDICINE

## 2022-11-18 PROCEDURE — 99214 OFFICE O/P EST MOD 30 MIN: CPT | Performed by: INTERNAL MEDICINE

## 2022-11-18 PROCEDURE — 3078F DIAST BP <80 MM HG: CPT | Performed by: INTERNAL MEDICINE

## 2022-11-18 PROCEDURE — 3074F SYST BP LT 130 MM HG: CPT | Performed by: INTERNAL MEDICINE

## 2022-11-18 PROCEDURE — 1123F ACP DISCUSS/DSCN MKR DOCD: CPT | Performed by: INTERNAL MEDICINE

## 2022-11-18 NOTE — PROGRESS NOTES
Out Patient CARDIOLOGY Follow Up Visit    Name: Lenore Escalera    Age: 81 y.o. Date of Admission: No admission date for patient encounter. Date of Service: 12/5/2022    Reason for Consultation:   Chief Complaint   Patient presents with    Hypertension     3mo visit          Referring Physician: No admitting provider for patient encounter. History of Present Illness: 20-year-old and Wolof-speaking with history of anxiety/depression, osteoarthritis, asthma, chronic back pain, chronic kidney disease, chronic osteomyelitis of the lumbar spine, hypertension, hyperlipidemia, hypothyroidism, and prior DVT/PE discharged from the hospital on April 27, 2022 with PE. He presented to the emergency room again on April 28, 2022 with chest pains and shortness of breath and again noted to have bilateral pulmonary embolism without RV strain after she has missed doses of her anticoagulation. She presented for follow-up visit today with a friend and report doing well and states leg edema has markedly improved with low-dose Lasix. She reports she is planning a trip to visit her brother in [facility] who has dementia and is advised to avoid missing any doses of her anticoagulation given her history of PE. She is also advised to at least get up and walk every 2 hours during the flight. She will follow-up with her pulmonologist to get the recommendation prior to flying to [facility]. Past Medical History:  Past Medical History:   Diagnosis Date    Anxiety and depression 7/13/2016    Arthritis     osteo    Asthma     has chronic couch    Chronic back pain     Chronic kidney disease (CKD), stage II (mild) 04/19/2012    Creatinine around 1.3 to 1.4 in April 2012 secondary to IV antibiotics following back surgery.     Chronic osteomyelitis of lumbar spine ([facility] [address]     note on chart from Dr. Jessica Lora dated 8/17/2017    Foot pain     after back surgery / chronic    HTN (hypertension), benign 1/10/2015    Hx of blood clots  / DVT, PE    Hx of migraines     Hyperlipidemia     Hypothyroidism     Hypothyroidism 2016    Neuropathy of leg     bilateral    Other postoperative infection 2011    Was on Vancomycin following the surgery for the removal of lumbar cysts. Patient on antibiotics for life     Radiculopathy, lumbar region 2012    Seizures ([address]     last seizure 2016 / gran mal    Sleeping difficulties     takes ambien    Spinal stenosis of lumbar region     Synovial cyst of lumbar spine 2011    Thrombocytopenia ([facility] [address] 2016    follows only with PCP    Tremors of nervous system     hands    Vitamin D deficiency     Wears dentures     upper       Past Surgical History:  Past Surgical History:   Procedure Laterality Date    BACK SURGERY      multiple    CERVICAL FUSION  ?  SECTION      CHOLECYSTECTOMY      lap    COLONOSCOPY      EYE SURGERY Bilateral     cataract extraction    JOINT REPLACEMENT      left    KNEE ARTHROPLASTY Right 2017    Total right knee arthroplasty    LUMBAR LAMINECTOMY  1985    CT COLONOSCOPY FLX DX W/COLLJ SPEC WHEN PFRMD N/A 6/15/2018    COLONOSCOPY DIAGNOSTIC performed by Iam Middleton MD at [address] [place] [address] EGD TRANSORAL BIOPSY SINGLE/MULTIPLE N/A 6/15/2018    EGD ESOPHAGOGASTRODUODENOSCOPY performed by Iam Middleton MD at [address] [address]    Neck    SPINE SURGERY  2012    synovial cyst    SPINE SURGERY  2012    rebuilt talibone    TONSILLECTOMY         Family History:  Family History   Problem Relation Age of Onset    Other Mother         cerebral hemorrhage, psychiatric problems    Heart Attack Father     Cirrhosis Brother        Social History:  Social History     Socioeconomic History    Marital status:       Spouse name: Not on file    Number of children: Not on file    Years of education: Not on file    Highest education level: Not on file   Occupational History    Not on file   Tobacco Use    Smoking status: Never    Smokeless tobacco: Never   Vaping Use    Vaping Use: Never used   Substance and Sexual Activity    Alcohol use: No     Comment: rare    Drug use: No    Sexual activity: Not Currently   Other Topics Concern    Not on file   Social History Narrative    Not on file     Social Determinants of Health     Financial Resource Strain: Not on file   Food Insecurity: Not on file   Transportation Needs: Not on file   Physical Activity: Not on file   Stress: Not on file   Social Connections: Not on file   Intimate Partner Violence: Not on file   Housing Stability: Not on file       Allergies: Allergies   Allergen Reactions    Albuterol Hives     Other reaction(s): Anaphylaxis    Iodine Shortness Of Breath    Vistaril [Hydroxyzine Hcl] Shortness Of Breath    Gabapentin Other (See Comments)     lethargic  Other reaction(s): Other See Comments  lethargic    Lyrica [Pregabalin]      lethargic    Metaxalone Other (See Comments)     Patient becomes sedated after taking Skelaxin  Other reaction(s): Other See Comments  Patient becomes sedated after taking Skelaxin    Morphine      Other reaction(s): Confusion       Home Medications:  Prior to Admission medications    Medication Sig Start Date End Date Taking?  Authorizing Provider   apixaban (ELIQUIS) 5 MG TABS tablet Take 5 mg by mouth 2 times daily   Yes Historical Provider, MD BLOCK ELLIPTA 100-25 MCG/INH AEPB inhaler  11/9/22  Yes Historical Provider, MD   losartan (COZAAR) 25 MG tablet Take 1 tablet by mouth daily 4/28/22  Yes [place] Pamela Chandra, APRN - CNP   furosemide (LASIX) 40 MG tablet Take 1 tablet by mouth daily 4/13/22  Yes Historical Provider, MD   omeprazole (PRILOSEC) 40 MG delayed release capsule Take 1 capsule by mouth daily  4/13/22  Yes Historical Provider, MD   propranolol (INDERAL) 20 MG tablet 1 in am and evening and 1.5 in afternoon  Patient taking differently: Take 20 mg by mouth 3 times daily 20 mg in am and evening and 30 mg in afternoon 2/14/22  Yes GENARO Black   phenytoin (DILANTIN) 100 MG ER capsule Take 1 capsule by mouth 2 times daily 2/14/22  Yes GENARO Black   potassium chloride (KLOR-CON M) 20 MEQ extended release tablet Take 20 mEq by mouth 2 times daily    Yes Historical Provider, MD   oxyCODONE (OXY-IR) 15 MG immediate release tablet Take 15 mg by mouth every 6 hours as needed for Pain. Yes Historical Provider, MD   diazepam (VALIUM) 10 MG tablet Take 1 tablet by mouth every 12 hours as needed for Anxiety  Patient taking differently: Take 10 mg by mouth every 8 hours as needed for Anxiety.  9/10/17  Yes Karen Kerr MD   atorvastatin (LIPITOR) 10 MG tablet Take 1 tablet by mouth nightly 12/11/16  Yes Sarah Renteria MD   levothyroxine (SYNTHROID) 25 MCG tablet Take 25 mcg by mouth daily   Yes Historical Provider, MD   apixaban (ELIQUIS) 5 MG TABS tablet Take 2 tablets by mouth 2 times daily for 12 doses  Patient not taking: Reported on 11/18/2022 4/27/22 7/1/22  Pascual Goodpasture Learn, APRN - CNP       Current Medications:  Current Outpatient Medications   Medication Sig Dispense Refill    apixaban (ELIQUIS) 5 MG TABS tablet Take 5 mg by mouth 2 times daily      BREO ELLIPTA 100-25 MCG/INH AEPB inhaler       losartan (COZAAR) 25 MG tablet Take 1 tablet by mouth daily 30 tablet 3    furosemide (LASIX) 40 MG tablet Take 1 tablet by mouth daily      omeprazole (PRILOSEC) 40 MG delayed release capsule Take 1 capsule by mouth daily       propranolol (INDERAL) 20 MG tablet 1 in am and evening and 1.5 in afternoon (Patient taking differently: Take 20 mg by mouth 3 times daily 20 mg in am and evening and 30 mg in afternoon) 310 tablet 3    phenytoin (DILANTIN) 100 MG ER capsule Take 1 capsule by mouth 2 times daily 180 capsule 3    potassium chloride (KLOR-CON M) 20 MEQ extended release tablet Take 20 mEq by mouth 2 times daily       oxyCODONE (OXY-IR) 15 MG immediate release tablet Take 15 mg by mouth every 6 hours as needed for Pain.      diazepam (VALIUM) 10 MG tablet Take 1 tablet by mouth every 12 hours as needed for Anxiety (Patient taking differently: Take 10 mg by mouth every 8 hours as needed for Anxiety.) 30 tablet 0    atorvastatin (LIPITOR) 10 MG tablet Take 1 tablet by mouth nightly 30 tablet 0    levothyroxine (SYNTHROID) 25 MCG tablet Take 25 mcg by mouth daily      apixaban (ELIQUIS) 5 MG TABS tablet Take 2 tablets by mouth 2 times daily for 12 doses (Patient not taking: Reported on 11/18/2022) 60 tablet 1     No current facility-administered medications for this visit. Review of Systems:   Cardiac: As per HPI  General: Denies fever or chills  Pulmonary: As per HPI  HEENT: Denies runny nose  GI: No complaints  : No complaints  Endocrine: Denies night sweats  Musculoskeletal: No complaints  Skin: Dry skin  Neuro: No complaints  Psych: Denies depression    Physical Exam:  /60   Pulse 80   Resp 18   Ht 5' (1.524 m)   Wt 175 lb 12.8 oz (79.7 kg)   BMI 34.33 kg/m²   Wt Readings from Last 3 Encounters:   11/18/22 175 lb 12.8 oz (79.7 kg)   07/01/22 178 lb (80.7 kg)   05/16/22 172 lb 3.2 oz (78.1 kg)       Appearance: Alert and oriented x3 not in acute distress. Skin: Dry skin  Head: Atraumatic  Eyes: Intact extraocular muscles   ENMT: Mucous membranes are moist  Neck: Supple  Lungs: Clear to auscultation  Cardiac: Normal S1 and S2  Abdomen: Protuberant  Extremities: Intact range of motion  Neurologic: No focal neurological deficits  Peripheral Pulses: 2+ peripheral pulses      Laboratory Tests:  No results for input(s): NA, K, CL, CO2, BUN, CREATININE, GLUCOSE, CALCIUM in the last 72 hours. Lab Results   Component Value Date/Time    MG 1.7 06/14/2018 04:22 AM    MG 1.9 10/31/2017 06:54 PM    MG 2.1 12/10/2016 09:05 PM     No results for input(s): ALKPHOS, ALT, AST, PROT, BILITOT, BILIDIR, LABALBU in the last 72 hours.   No results for input(s): WBC, RBC, HGB, study available. Stress test reviewed:      Cardiac catheterization reviewed:     CXR reviewed: The ASCVD Risk score (Es DK, et al., 2019) failed to calculate for the following reasons: The 2019 ASCVD risk score is only valid for ages 36 to 78    ASSESSMENT / PLAN:    1. Leg edema  Report improvement current dose of Lasix and compression stockings    2. HTN (hypertension), benign  Stable      3. Bilateral pulmonary embolism (HCC)  Continue Eliquis and follow-up with pulmonology    4. Pulmonary embolism, bilateral ([address]  As mentioned above  5. Gastroesophageal reflux disease without esophagitis  With your PCP  6. Mixed simple and mucopurulent chronic bronchitis ([address]  Follow-up with your pulmonologist  7. CKD (chronic kidney disease) stage 2, GFR 60-89 ml/min  Follow-up with your PCP  8. History of DVT (deep vein thrombosis)  On Eliquis  9. History of epilepsy    10. Obesity (BMI 30-39. 9)  Weight loss is recommended  11. Other forms of angina pectoris ([address]  Denies any symptoms today  12. Hx of pulmonary embolus  As mentioned above  13. Hypercholesterolemia  Continue statin therapy  14. Intractable migraine without aura and without status migrainosus  Follow-up with your PCP       Follow in Return in about 6 months (around 5/18/2023). Thank you for allowing me to participate in your patient's care. Please feel free to contact me if you have any questions or concerns.     Liam Garcia MD  [facility]) Cardiology

## 2023-01-20 ENCOUNTER — OFFICE VISIT (OUTPATIENT)
Dept: NEUROLOGY | Age: 83
End: 2023-01-20
Payer: MEDICARE

## 2023-01-20 VITALS
TEMPERATURE: 97.5 F | DIASTOLIC BLOOD PRESSURE: 88 MMHG | BODY MASS INDEX: 34.18 KG/M2 | OXYGEN SATURATION: 98 % | SYSTOLIC BLOOD PRESSURE: 149 MMHG | WEIGHT: 175 LBS | HEART RATE: 81 BPM

## 2023-01-20 DIAGNOSIS — G40.109 PARTIAL SYMPTOMATIC EPILEPSY WITH SIMPLE PARTIAL SEIZURES, NOT INTRACTABLE, WITHOUT STATUS EPILEPTICUS (HCC): Primary | ICD-10-CM

## 2023-01-20 PROCEDURE — 99213 OFFICE O/P EST LOW 20 MIN: CPT | Performed by: CLINICAL NURSE SPECIALIST

## 2023-01-20 PROCEDURE — 3079F DIAST BP 80-89 MM HG: CPT | Performed by: CLINICAL NURSE SPECIALIST

## 2023-01-20 PROCEDURE — 1123F ACP DISCUSS/DSCN MKR DOCD: CPT | Performed by: CLINICAL NURSE SPECIALIST

## 2023-01-20 PROCEDURE — 3077F SYST BP >= 140 MM HG: CPT | Performed by: CLINICAL NURSE SPECIALIST

## 2023-01-20 RX ORDER — DOXEPIN HYDROCHLORIDE 50 MG/1
CAPSULE ORAL
COMMUNITY
Start: 2023-01-05

## 2023-01-20 RX ORDER — RIVAROXABAN 20 MG/1
TABLET, FILM COATED ORAL
COMMUNITY
Start: 2023-01-05

## 2023-01-20 NOTE — PROGRESS NOTES
Lore Leyva is a 81 y.o. born left-handed woman    Patient began having seizures when she was 16 and living in [place]. She reports GTC seizures at that time and does not recall being on any medication. She reports they \"went away\" from her early 25s until her 62s. Since then she has had a few GTC seizures. Patient reports having a feeling of doom and developing a \"funny\" sensation in her head. She then falls to the ground her son reports and shakes from 3 minutes to 8 minutes. Afterwards she is confused for hours if not the rest of the day. Her last seizures was over 4-5 years ago and Dilantin was started. Prior to Dilantin, she had tried Keppra (which caused mood changes) and she has tried Trileptal which caused hyponatremia reportedly. Dilantin was used as a child with success therefore it does appear this was the reason it was utilized again     She is tolerating Dilantin well (200mg daily)    EEG was reviewed from May, 2017 - stable without seizure activity     MRI was done in December 2016     Now with worsening tremor   Is taking Inderal 20mg the morning and evening and 30 mg in the afternoon   She has any dizziness or lightheadedness    She is an excellent historian     No chest pain or palpitations  No SOB  No vertigo, lightheadedness or loss of consciousness  No falls, tripping or stumbling  No incontinence of bowels or bladder  No itching or bruising appreciated  No numbness, tingling or focal arm/leg weakness    ROS otherwise negative     Prior to Visit Medications    Medication Sig Taking?  Authorizing Provider   doxepin (SINEQUAN) 50 MG capsule TAKE 1 CAPSULE BY MOUTH EVERYDAY AT BEDTIME Yes Historical Provider, MD   XARELTO 20 MG TABS tablet TAKE 1 TABLET BY MOUTH EVERY DAY Yes Historical Provider, MD BLOCK ELLIPTA 100-25 MCG/INH AEPB inhaler  Yes Historical Provider, MD   losartan (COZAAR) 25 MG tablet Take 1 tablet by mouth daily Yes Lupillo Chandra, APRN - CNP   furosemide (LASIX) 40 MG tablet Take 1 tablet by mouth daily Yes Historical Provider, MD   omeprazole (PRILOSEC) 40 MG delayed release capsule Take 1 capsule by mouth daily  Yes Historical Provider, MD   propranolol (INDERAL) 20 MG tablet 1 in am and evening and 1.5 in afternoon  Patient taking differently: Take 20 mg by mouth 3 times daily 20 mg in am and evening and 30 mg in afternoon Yes GENARO Moreno   phenytoin (DILANTIN) 100 MG ER capsule Take 1 capsule by mouth 2 times daily Yes GENARO Moreno   potassium chloride (KLOR-CON M) 20 MEQ extended release tablet Take 20 mEq by mouth 2 times daily  Yes Historical Provider, MD   oxyCODONE (OXY-IR) 15 MG immediate release tablet Take 15 mg by mouth every 6 hours as needed for Pain. Yes Historical Provider, MD   diazepam (VALIUM) 10 MG tablet Take 1 tablet by mouth every 12 hours as needed for Anxiety  Patient taking differently: Take 10 mg by mouth every 8 hours as needed for Anxiety.  Yes Lyn Mcfarlane MD   atorvastatin (LIPITOR) 10 MG tablet Take 1 tablet by mouth nightly Yes Chloe Aquino MD   levothyroxine (SYNTHROID) 25 MCG tablet Take 25 mcg by mouth daily Yes Historical Provider, MD     Allergies as of 01/20/2023 - Fully Reviewed 01/20/2023   Allergen Reaction Noted    Albuterol Hives 01/22/2014    Iodine Shortness Of Breath 01/22/2014    Vistaril [hydroxyzine hcl] Shortness Of Breath 01/22/2014    Gabapentin Other (See Comments) 01/02/2015    Lyrica [pregabalin]  07/13/2016    Metaxalone Other (See Comments) 01/22/2014    Morphine  05/26/2021     Objective:   BP (!) 149/88 (Site: Left Upper Arm, Position: Sitting)   Pulse 81   Temp 97.5 °F (36.4 °C)   Wt 175 lb (79.4 kg)   SpO2 98%   BMI 34.18 kg/m²      General appearance: alert, appears stated age and cooperative  Head: Normocephalic, without obvious abnormality, atraumatic  Extremities:  no cyanosis, clubbing or edema  Pulses: 2+ and symmetric  Skin:  no rashes or lesions Mental Status: Alert, oriented, thought content appropriate    Excellent historian     Speech: clear with [place] accent   Language: appropriate     Cranial Nerves:  I: smell    II: visual acuity     II: visual fields Full    II: pupils GREGRO   III,VII: ptosis None   III,IV,VI: extraocular muscles  EOMI without nystagmus    V: mastication Normal   V: facial light touch sensation  Normal   V,VII: corneal reflex  Present   VII: facial muscle function - upper     VII: facial muscle function - lower Normal   VIII: hearing Normal   IX: soft palate elevation  Normal   IX,X: gag reflex    XI: trapezius strength  5/5   XI: sternocleidomastoid strength 5/5   XI: neck extension strength  5/5   XII: tongue strength  Normal     Motor:  5/5 throughout  Normal bulk and tone     Minimal tremor with outstretched hands   No cogwheel rigidity  No ataxic tremor     Sensory:  LT normal  Vibration minimally decreased in ankles     Coordination:   FN, FFM and KANDY normal  HS normal    Gait:  Normal     DTR:   No reflexes    No Starr's     No palmar grasps - no suck reflex     Laboratory/Radiology:     CBC with Differential:    Lab Results   Component Value Date/Time    WBC 7.7 05/09/2022 08:33 AM    RBC 4.26 05/09/2022 08:33 AM    HGB 12.7 05/09/2022 08:33 AM    HCT 39.2 05/09/2022 08:33 AM     05/09/2022 08:33 AM    MCV 92.0 05/09/2022 08:33 AM    MCH 29.8 05/09/2022 08:33 AM    MCHC 32.4 05/09/2022 08:33 AM    RDW 13.2 05/09/2022 08:33 AM    SEGSPCT 54 07/24/2011 05:40 PM    LYMPHOPCT 25.3 05/09/2022 08:33 AM    MONOPCT 9.2 05/09/2022 08:33 AM    BASOPCT 1.0 05/09/2022 08:33 AM    MONOSABS 0.71 05/09/2022 08:33 AM    LYMPHSABS 1.96 05/09/2022 08:33 AM    EOSABS 0.23 05/09/2022 08:33 AM    BASOSABS 0.08 05/09/2022 08:33 AM     CMP:    Lab Results   Component Value Date/Time     07/01/2022 11:41 AM    K 4.2 07/01/2022 11:41 AM    K 4.5 05/09/2022 08:33 AM     07/01/2022 11:41 AM    CO2 23 07/01/2022 11:41 AM    BUN 25 2022 11:41 AM    CREATININE 1.1 2022 11:41 AM    GFRAA 58 2022 11:41 AM    LABGLOM 48 2022 11:41 AM    GLUCOSE 118 2022 11:41 AM    GLUCOSE 105 2011 05:40 PM    PROT 6.9 2022 08:33 AM    LABALBU 3.5 2022 08:33 AM    LABALBU 3.3 2011 05:40 PM    CALCIUM 9.2 2022 11:41 AM    BILITOT 0.3 2022 08:33 AM    ALKPHOS 176 2022 08:33 AM    AST 24 2022 08:33 AM    ALT 24 2022 08:33 AM     EEG - May 2017   Normal     MRI Brain - Dec 2016   No acute abnormality    Minimal  appreciated    Dilantin level May 2017 (drawn in  reportedly as well)    5.8     I independently reviewed the labs and EEG studies at today's appointment. Assessment:     Simple partial seizures with secondary generalization   No seizures in over three years    Doubt underlying dementia at this time   She is an excellent historian for me    I question her cognitive issues being related to partial seizures/hearing issues and not Alzheimer's      Benign essential tremor   Minimal -- on propranolol     Chronic migraine history   Rare use of triptan medications -- will renew at this time     Patient Active Problem List   Diagnosis    HTN (hypertension), benign    Asthma    History of DVT (deep vein thrombosis)    Dementia    CKD (chronic kidney disease) stage 2, GFR 60-89 ml/min    History of epilepsy     Plan:      Will continue Dilantin for now   Tolerating well   No seizure recurrence    If recurrent seizure, will switch to different AED-she is agreeable    We will continue propranolol at current dose     RTO 5-6 months but will call in the interim with difficulties     GENARO Uriostegui - CNS  11:20 AM  2023

## 2023-01-24 DIAGNOSIS — G40.109 PARTIAL SYMPTOMATIC EPILEPSY WITH SIMPLE PARTIAL SEIZURES, NOT INTRACTABLE, WITHOUT STATUS EPILEPTICUS (HCC): ICD-10-CM

## 2023-01-25 RX ORDER — PROPRANOLOL HYDROCHLORIDE 20 MG/1
20 TABLET ORAL 3 TIMES DAILY
Qty: 105 TABLET | Refills: 2 | Status: SHIPPED | OUTPATIENT
Start: 2023-01-25

## 2023-01-25 RX ORDER — PHENYTOIN SODIUM 100 MG/1
100 CAPSULE, EXTENDED RELEASE ORAL 2 TIMES DAILY
Qty: 60 CAPSULE | Refills: 0 | Status: SHIPPED | OUTPATIENT
Start: 2023-01-25

## 2023-05-31 DIAGNOSIS — G40.109 PARTIAL SYMPTOMATIC EPILEPSY WITH SIMPLE PARTIAL SEIZURES, NOT INTRACTABLE, WITHOUT STATUS EPILEPTICUS (HCC): ICD-10-CM

## 2023-06-01 RX ORDER — PHENYTOIN SODIUM 100 MG/1
100 CAPSULE, EXTENDED RELEASE ORAL 2 TIMES DAILY
Qty: 60 CAPSULE | Refills: 0 | Status: SHIPPED | OUTPATIENT
Start: 2023-06-01

## 2023-06-24 ENCOUNTER — HOSPITAL ENCOUNTER (EMERGENCY)
Age: 83
Discharge: HOME | End: 2023-06-24
Attending: EMERGENCY MEDICINE
Payer: MEDICARE

## 2023-06-24 ENCOUNTER — APPOINTMENT (OUTPATIENT)
Dept: GENERAL RADIOLOGY | Age: 83
End: 2023-06-24
Payer: MEDICARE

## 2023-06-24 ENCOUNTER — APPOINTMENT (OUTPATIENT)
Dept: CT IMAGING | Age: 83
End: 2023-06-24
Payer: MEDICARE

## 2023-06-24 VITALS
OXYGEN SATURATION: 97 % | HEART RATE: 68 BPM | RESPIRATION RATE: 18 BRPM | WEIGHT: 180 LBS | TEMPERATURE: 97.7 F | BODY MASS INDEX: 35.15 KG/M2 | SYSTOLIC BLOOD PRESSURE: 130 MMHG | DIASTOLIC BLOOD PRESSURE: 62 MMHG
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
## 2023-06-24 DIAGNOSIS — S09.90XA CLOSED HEAD INJURY, INITIAL ENCOUNTER: Primary | ICD-10-CM

## 2023-06-24 DIAGNOSIS — T07.XXXA MULTIPLE CONTUSIONS: ICD-10-CM

## 2023-06-24 LAB
ALBUMIN SERPL-MCNC: 4 G/DL (ref 3.5–5.2)
ALP SERPL-CCNC: 216 U/L (ref 35–104)
ALT SERPL-CCNC: 21 U/L (ref 0–32)
ANION GAP SERPL CALCULATED.3IONS-SCNC: 13 MMOL/L (ref 7–16)
AST SERPL-CCNC: 25 U/L (ref 0–31)
BASOPHILS # BLD: 0.08 E9/L (ref 0–0.2)
BASOPHILS NFR BLD: 0.7 % (ref 0–2)
BILIRUB SERPL-MCNC: 0.3 MG/DL (ref 0–1.2)
BUN SERPL-MCNC: 28 MG/DL (ref 6–23)
CALCIUM SERPL-MCNC: 9.4 MG/DL (ref 8.6–10.2)
CHLORIDE SERPL-SCNC: 100 MMOL/L (ref 98–107)
CO2 SERPL-SCNC: 23 MMOL/L (ref 22–29)
CREAT SERPL-MCNC: 0.9 MG/DL (ref 0.5–1)
EKG ATRIAL RATE: 67 BPM
EKG P AXIS: 14 DEGREES
EKG P-R INTERVAL: 188 MS
EKG Q-T INTERVAL: 390 MS
EKG QRS DURATION: 78 MS
EKG QTC CALCULATION (BAZETT): 412 MS
EKG R AXIS: 22 DEGREES
EKG T AXIS: 29 DEGREES
EKG VENTRICULAR RATE: 67 BPM
EOSINOPHIL # BLD: 0.32 E9/L (ref 0.05–0.5)
EOSINOPHIL NFR BLD: 3 % (ref 0–6)
ERYTHROCYTE [DISTWIDTH] IN BLOOD BY AUTOMATED COUNT: 13.6 FL (ref 11.5–15)
GLUCOSE SERPL-MCNC: 102 MG/DL (ref 74–99)
HCT VFR BLD AUTO: 44.7 % (ref 34–48)
HGB BLD-MCNC: 15 G/DL (ref 11.5–15.5)
IMM GRANULOCYTES # BLD: 0.04 E9/L
IMM GRANULOCYTES NFR BLD: 0.4 % (ref 0–5)
LYMPHOCYTES # BLD: 3.04 E9/L (ref 1.5–4)
LYMPHOCYTES NFR BLD: 28.2 % (ref 20–42)
MCH RBC QN AUTO: 30.4 PG (ref 26–35)
MCHC RBC AUTO-ENTMCNC: 33.6 % (ref 32–34.5)
MCV RBC AUTO: 90.5 FL (ref 80–99.9)
MONOCYTES # BLD: 1.05 E9/L (ref 0.1–0.95)
MONOCYTES NFR BLD: 9.7 % (ref 2–12)
NEUTROPHILS # BLD: 6.25 E9/L (ref 1.8–7.3)
NEUTS SEG NFR BLD: 58 % (ref 43–80)
PLATELET # BLD AUTO: 184 E9/L (ref 130–450)
PMV BLD AUTO: 10.2 FL (ref 7–12)
POTASSIUM SERPL-SCNC: 4.3 MMOL/L (ref 3.5–5)
PROT SERPL-MCNC: 7.7 G/DL (ref 6.4–8.3)
RBC # BLD AUTO: 4.94 E12/L (ref 3.5–5.5)
SODIUM SERPL-SCNC: 136 MMOL/L (ref 132–146)
TROPONIN, HIGH SENSITIVITY: 8 NG/L (ref 0–9)
WBC # BLD: 10.8 E9/L (ref 4.5–11.5)

## 2023-06-24 PROCEDURE — 99285 EMERGENCY DEPT VISIT HI MDM: CPT

## 2023-06-24 PROCEDURE — 93005 ELECTROCARDIOGRAM TRACING: CPT | Performed by: NURSE PRACTITIONER

## 2023-06-24 PROCEDURE — 84484 ASSAY OF TROPONIN QUANT: CPT

## 2023-06-24 PROCEDURE — 85025 COMPLETE CBC W/AUTO DIFF WBC: CPT

## 2023-06-24 PROCEDURE — 6370000000 HC RX 637 (ALT 250 FOR IP): Performed by: EMERGENCY MEDICINE

## 2023-06-24 PROCEDURE — 72131 CT LUMBAR SPINE W/O DYE: CPT

## 2023-06-24 PROCEDURE — 72125 CT NECK SPINE W/O DYE: CPT

## 2023-06-24 PROCEDURE — 80053 COMPREHEN METABOLIC PANEL: CPT

## 2023-06-24 PROCEDURE — 73502 X-RAY EXAM HIP UNI 2-3 VIEWS: CPT

## 2023-06-24 PROCEDURE — 36415 COLL VENOUS BLD VENIPUNCTURE: CPT

## 2023-06-24 PROCEDURE — 70450 CT HEAD/BRAIN W/O DYE: CPT

## 2023-06-24 RX ORDER — ONDANSETRON 4 MG/1
4 TABLET, ORALLY DISINTEGRATING ORAL ONCE
Status: COMPLETED | OUTPATIENT
Start: 2023-06-24 | End: 2023-06-24

## 2023-06-24 RX ORDER — HYDROCODONE BITARTRATE AND ACETAMINOPHEN 5; 325 MG/1; MG/1
1 TABLET ORAL ONCE
Status: COMPLETED | OUTPATIENT
Start: 2023-06-24 | End: 2023-06-24

## 2023-06-24 RX ADMIN — ONDANSETRON 4 MG: 4 TABLET, ORALLY DISINTEGRATING ORAL at 18:54

## 2023-06-24 RX ADMIN — HYDROCODONE BITARTRATE AND ACETAMINOPHEN 1 TABLET: 5; 325 TABLET ORAL at 18:54

## 2023-06-24 ASSESSMENT — LIFESTYLE VARIABLES
HOW OFTEN DO YOU HAVE A DRINK CONTAINING ALCOHOL: NEVER
HOW MANY STANDARD DRINKS CONTAINING ALCOHOL DO YOU HAVE ON A TYPICAL DAY: PATIENT DOES NOT DRINK

## 2023-06-24 ASSESSMENT — PAIN DESCRIPTION - LOCATION
LOCATION: OTHER (COMMENT)
LOCATION: COCCYX;HEAD

## 2023-06-24 ASSESSMENT — PAIN DESCRIPTION - FREQUENCY: FREQUENCY: CONTINUOUS

## 2023-06-24 ASSESSMENT — PAIN DESCRIPTION - ONSET: ONSET: ON-GOING

## 2023-06-24 ASSESSMENT — PAIN DESCRIPTION - DESCRIPTORS
DESCRIPTORS: ACHING;DISCOMFORT;SORE;TENDER
DESCRIPTORS: ACHING;DULL;DISCOMFORT

## 2023-06-24 ASSESSMENT — PAIN SCALES - GENERAL
PAINLEVEL_OUTOF10: 10
PAINLEVEL_OUTOF10: 8

## 2023-06-24 ASSESSMENT — PAIN DESCRIPTION - PAIN TYPE: TYPE: ACUTE PAIN

## 2023-06-24 ASSESSMENT — PAIN - FUNCTIONAL ASSESSMENT: PAIN_FUNCTIONAL_ASSESSMENT: 0-10

## 2023-06-24 NOTE — ED NOTES
Discharge instructions reviewed , including diagnosis, medications, follow up appointments, home care, and also when to call 911. All discharge instructions questions answered.      IV removed    Pt left ED ambulatory       Meagan Sanchez RN  06/24/23 [id number]

## 2023-06-24 NOTE — ED PROVIDER NOTES
HPI:  23, Time: 6:12 PM EDT        Stephanie Booth is a 81 y.o. female presenting to the ED for injury from a fall after she tripped on a step while going into the basement to do her laundry, beginning 1 hour ago. The complaint has been persistent, moderate in severity, and worsened by nothing. Patient states she fell backward hitting her head and also her tailbone after slipping. Patient is on blood thinners. She did not sustain any loss of consciousness. She denies any chest pain/pressure/pain or tightness. Pain is rated 10/10 severity. No relieving factors reported. Review of Systems:   A complete review of systems was performed and pertinent positives and negatives are stated within HPI, all other systems reviewed and are negative.      --------------------------------------------- PAST HISTORY ---------------------------------------------  Past Medical History:  has a past medical history of Anxiety and depression, Arthritis, Asthma, Chronic back pain, Chronic kidney disease (CKD), stage II (mild), Chronic osteomyelitis of lumbar spine ([address] Foot pain, HTN (hypertension), benign, Hx of blood clots, Hx of migraines, Hyperlipidemia, Hypothyroidism, Hypothyroidism, Neuropathy of leg, Other postoperative infection, Radiculopathy, lumbar region, Seizures ([address] Sleeping difficulties, Spinal stenosis of lumbar region, Synovial cyst of lumbar spine, Thrombocytopenia ([address] Tremors of nervous system, Vitamin D deficiency, and Wears dentures. Past Surgical History:  has a past surgical history that includes lumbar laminectomy (); cervical fusion (?); joint replacement (); Spine surgery ( & ); Spine surgery (); Spine surgery (); Tonsillectomy; Colonoscopy;  section; Cholecystectomy ('s); eye surgery (Bilateral, ); back surgery; Spine surgery ();  Knee Arthroplasty (Right, 2017); pr egd transoral biopsy single/multiple (N/A, 6/15/2018); and pr

## 2023-06-26 LAB
EKG ATRIAL RATE: 67 BPM
EKG P AXIS: 14 DEGREES
EKG P-R INTERVAL: 188 MS
EKG Q-T INTERVAL: 390 MS
EKG QRS DURATION: 78 MS
EKG QTC CALCULATION (BAZETT): 412 MS
EKG R AXIS: 22 DEGREES
EKG T AXIS: 29 DEGREES
EKG VENTRICULAR RATE: 67 BPM

## 2023-06-26 PROCEDURE — 93010 ELECTROCARDIOGRAM REPORT: CPT | Performed by: INTERNAL MEDICINE

## 2023-07-17 ENCOUNTER — OFFICE VISIT (OUTPATIENT)
Dept: NEUROLOGY | Age: 83
End: 2023-07-17
Payer: MEDICARE

## 2023-07-17 VITALS
HEART RATE: 68 BPM | DIASTOLIC BLOOD PRESSURE: 69 MMHG | OXYGEN SATURATION: 99 % | BODY MASS INDEX: 33.79 KG/M2 | WEIGHT: 173 LBS | SYSTOLIC BLOOD PRESSURE: 128 MMHG | TEMPERATURE: 97.8 F

## 2023-07-17 DIAGNOSIS — M54.17 L-S RADICULOPATHY: ICD-10-CM

## 2023-07-17 DIAGNOSIS — G40.109 PARTIAL SYMPTOMATIC EPILEPSY WITH SIMPLE PARTIAL SEIZURES, NOT INTRACTABLE, WITHOUT STATUS EPILEPTICUS (HCC): Primary | ICD-10-CM

## 2023-07-17 PROCEDURE — 3074F SYST BP LT 130 MM HG: CPT | Performed by: CLINICAL NURSE SPECIALIST

## 2023-07-17 PROCEDURE — 1123F ACP DISCUSS/DSCN MKR DOCD: CPT | Performed by: CLINICAL NURSE SPECIALIST

## 2023-07-17 PROCEDURE — 3078F DIAST BP <80 MM HG: CPT | Performed by: CLINICAL NURSE SPECIALIST

## 2023-07-17 PROCEDURE — 99214 OFFICE O/P EST MOD 30 MIN: CPT | Performed by: CLINICAL NURSE SPECIALIST

## 2023-07-17 RX ORDER — GABAPENTIN 100 MG/1
CAPSULE ORAL
COMMUNITY
Start: 2023-06-12

## 2023-07-17 RX ORDER — PHENYTOIN SODIUM 100 MG/1
100 CAPSULE, EXTENDED RELEASE ORAL 2 TIMES DAILY
Qty: 180 CAPSULE | Refills: 3 | Status: SHIPPED | OUTPATIENT
Start: 2023-07-17

## 2023-07-17 RX ORDER — PROPRANOLOL HYDROCHLORIDE 20 MG/1
20 TABLET ORAL 3 TIMES DAILY
Qty: 315 TABLET | Refills: 3 | Status: SHIPPED | OUTPATIENT
Start: 2023-07-17

## 2024-01-02 ENCOUNTER — TELEPHONE (OUTPATIENT)
Dept: NEUROLOGY | Age: 84
End: 2024-01-02

## 2024-01-02 NOTE — TELEPHONE ENCOUNTER
Son called wanting to get his mother in to see Bryce.  He said that for the last couple days, her legs have been giving out on her.  He said that she called him yesterday and said that she was shaking and afraid that she was having a seizure, when he wanted to call an ambulance for her she refused.  He said he said that he does not see signs of her having had a seizure, but her legs are very weak.

## 2024-01-03 ENCOUNTER — OFFICE VISIT (OUTPATIENT)
Dept: NEUROLOGY | Age: 84
End: 2024-01-03
Payer: MEDICARE

## 2024-01-03 VITALS
BODY MASS INDEX: 33.2 KG/M2 | HEART RATE: 105 BPM | SYSTOLIC BLOOD PRESSURE: 120 MMHG | WEIGHT: 170 LBS | TEMPERATURE: 97.7 F | OXYGEN SATURATION: 95 % | DIASTOLIC BLOOD PRESSURE: 77 MMHG

## 2024-01-03 DIAGNOSIS — G40.109 PARTIAL SYMPTOMATIC EPILEPSY WITH SIMPLE PARTIAL SEIZURES, NOT INTRACTABLE, WITHOUT STATUS EPILEPTICUS (HCC): Primary | ICD-10-CM

## 2024-01-03 DIAGNOSIS — E78.5 HYPERLIPIDEMIA, UNSPECIFIED HYPERLIPIDEMIA TYPE: ICD-10-CM

## 2024-01-03 DIAGNOSIS — G62.9 PERIPHERAL POLYNEUROPATHY: ICD-10-CM

## 2024-01-03 PROCEDURE — 1123F ACP DISCUSS/DSCN MKR DOCD: CPT | Performed by: CLINICAL NURSE SPECIALIST

## 2024-01-03 PROCEDURE — 3078F DIAST BP <80 MM HG: CPT | Performed by: CLINICAL NURSE SPECIALIST

## 2024-01-03 PROCEDURE — 99214 OFFICE O/P EST MOD 30 MIN: CPT | Performed by: CLINICAL NURSE SPECIALIST

## 2024-01-03 PROCEDURE — 3074F SYST BP LT 130 MM HG: CPT | Performed by: CLINICAL NURSE SPECIALIST

## 2024-01-03 RX ORDER — PREGABALIN 25 MG/1
25 CAPSULE ORAL NIGHTLY
COMMUNITY
Start: 2023-12-08

## 2024-01-03 RX ORDER — PHENYTOIN SODIUM 100 MG/1
300 CAPSULE, EXTENDED RELEASE ORAL DAILY
Qty: 270 CAPSULE | Refills: 3 | Status: SHIPPED | OUTPATIENT
Start: 2024-01-03

## 2024-01-03 NOTE — PROGRESS NOTES
Lucinda Guadarrama is a 83 y.o. born left-handed woman    Patient began having seizures when she was 15 and living in [facility].     She reports GTC seizures at that time and does not recall being on any medication. She reports they \"went away\" from her early 20s until her 60s. Since then she has had a few GTC seizures.     Patient reports having a feeling of doom and developing a \"funny\" sensation in her head. She then falls to the ground her son reports and shakes from 3 minutes to 8 minutes. Afterwards she is confused for hours if not the rest of the day.    Her last seizure was over 5 years ago and Dilantin was started -blood because she was on lower doses previously she was only taking 200 mg daily and was tolerating it very well and again had been seizure-free.    Unfortunately on January 1, 2024 she was getting out of bed and she felt weak all over going to the ground being very confused.  She was so confused she even called her son 11 times within a 5-minute period.  She has no recollection of this.  There is presumption that she suffered another seizure but denies ever losing consciousness biting her tongue or losing continence.    We did discuss recommending to titrate Dilantin to a more therapeutic level as well as obtaining labs which she is agreeable for    Prior to Dilantin, she had tried Keppra (which caused mood changes) and she has tried Trileptal which caused hyponatremia reportedly.    Dilantin was used as a child with success therefore it does appear this was the reason it was utilized again     EEG was reviewed from May, 2017 - stable without seizure activity     MRI was done in December 2016     Essential tremor has been quite stable    She has been following with pain management for low back pains.  She has suffered a number of falls and states that she has neuropathy in her right foot  There have been no electrodiagnostic studies performed    She is an excellent historian     Allergies as of

## 2024-01-11 ENCOUNTER — APPOINTMENT (OUTPATIENT)
Dept: CT IMAGING | Age: 84
DRG: 690 | End: 2024-01-11
Payer: MEDICARE

## 2024-01-11 ENCOUNTER — HOSPITAL ENCOUNTER (INPATIENT)
Age: 84
LOS: 3 days | Discharge: HOME | DRG: 690 | End: 2024-01-14
Attending: EMERGENCY MEDICINE | Admitting: INTERNAL MEDICINE
Payer: MEDICARE

## 2024-01-11 ENCOUNTER — APPOINTMENT (OUTPATIENT)
Dept: GENERAL RADIOLOGY | Age: 84
DRG: 690 | End: 2024-01-11
Payer: MEDICARE

## 2024-01-11 DIAGNOSIS — R44.3 HALLUCINATIONS: ICD-10-CM

## 2024-01-11 DIAGNOSIS — N30.00 ACUTE CYSTITIS WITHOUT HEMATURIA: Primary | ICD-10-CM

## 2024-01-11 DIAGNOSIS — R41.82 ALTERED MENTAL STATUS, UNSPECIFIED ALTERED MENTAL STATUS TYPE: ICD-10-CM

## 2024-01-11 LAB
ALBUMIN SERPL-MCNC: 3.6 G/DL (ref 3.5–5.2)
ALP SERPL-CCNC: 201 U/L (ref 35–104)
ALT SERPL-CCNC: 42 U/L (ref 0–32)
ANION GAP SERPL CALCULATED.3IONS-SCNC: 10 MMOL/L (ref 7–16)
AST SERPL-CCNC: 48 U/L (ref 0–31)
BACTERIA URNS QL MICRO: ABNORMAL
BASOPHILS # BLD: 0.07 K/UL (ref 0–0.2)
BASOPHILS NFR BLD: 1 % (ref 0–2)
BILIRUB SERPL-MCNC: 0.2 MG/DL (ref 0–1.2)
BILIRUB UR QL STRIP: NEGATIVE
BUN SERPL-MCNC: 23 MG/DL (ref 6–23)
CALCIUM SERPL-MCNC: 9.1 MG/DL (ref 8.6–10.2)
CASTS #/AREA URNS LPF: ABNORMAL /LPF
CHLORIDE SERPL-SCNC: 104 MMOL/L (ref 98–107)
CLARITY UR: CLEAR
CO2 SERPL-SCNC: 24 MMOL/L (ref 22–29)
COLOR UR: YELLOW
CREAT SERPL-MCNC: 0.8 MG/DL (ref 0.5–1)
EKG ATRIAL RATE: 67 BPM
EKG P AXIS: 32 DEGREES
EKG P-R INTERVAL: 218 MS
EKG Q-T INTERVAL: 410 MS
EKG QRS DURATION: 74 MS
EKG QTC CALCULATION (BAZETT): 433 MS
EKG R AXIS: -4 DEGREES
EKG T AXIS: 12 DEGREES
EKG VENTRICULAR RATE: 67 BPM
EOSINOPHIL # BLD: 0.44 K/UL (ref 0.05–0.5)
EOSINOPHILS RELATIVE PERCENT: 7 % (ref 0–6)
EPI CELLS #/AREA URNS HPF: ABNORMAL /HPF
ERYTHROCYTE [DISTWIDTH] IN BLOOD BY AUTOMATED COUNT: 13.5 % (ref 11.5–15)
GFR SERPL CREATININE-BSD FRML MDRD: >60 ML/MIN/1.73M2
GLUCOSE SERPL-MCNC: 115 MG/DL (ref 74–99)
GLUCOSE UR STRIP-MCNC: NEGATIVE MG/DL
HCT VFR BLD AUTO: 44.1 % (ref 34–48)
HGB BLD-MCNC: 14.4 G/DL (ref 11.5–15.5)
HGB UR QL STRIP.AUTO: NEGATIVE
IMM GRANULOCYTES # BLD AUTO: <0.03 K/UL (ref 0–0.58)
IMM GRANULOCYTES NFR BLD: 0 % (ref 0–5)
INFLUENZA A BY PCR: NOT DETECTED
INFLUENZA B BY PCR: NOT DETECTED
KETONES UR STRIP-MCNC: NEGATIVE MG/DL
LEUKOCYTE ESTERASE UR QL STRIP: ABNORMAL
LYMPHOCYTES NFR BLD: 2.1 K/UL (ref 1.5–4)
LYMPHOCYTES RELATIVE PERCENT: 35 % (ref 20–42)
MCH RBC QN AUTO: 30.1 PG (ref 26–35)
MCHC RBC AUTO-ENTMCNC: 32.7 G/DL (ref 32–34.5)
MCV RBC AUTO: 92.3 FL (ref 80–99.9)
MONOCYTES NFR BLD: 0.7 K/UL (ref 0.1–0.95)
MONOCYTES NFR BLD: 12 % (ref 2–12)
NEUTROPHILS NFR BLD: 44 % (ref 43–80)
NEUTS SEG NFR BLD: 2.66 K/UL (ref 1.8–7.3)
NITRITE UR QL STRIP: POSITIVE
PH UR STRIP: 5.5 [PH] (ref 5–9)
PHENYTOIN SERPL-MCNC: 17.3 UG/ML (ref 10–20)
PLATELET # BLD AUTO: 200 K/UL (ref 130–450)
PMV BLD AUTO: 9.9 FL (ref 7–12)
POTASSIUM SERPL-SCNC: 4.6 MMOL/L (ref 3.5–5)
PROT SERPL-MCNC: 7.4 G/DL (ref 6.4–8.3)
PROT UR STRIP-MCNC: NEGATIVE MG/DL
RBC # BLD AUTO: 4.78 M/UL (ref 3.5–5.5)
RBC #/AREA URNS HPF: ABNORMAL /HPF
SARS-COV-2 RDRP RESP QL NAA+PROBE: NOT DETECTED
SODIUM SERPL-SCNC: 138 MMOL/L (ref 132–146)
SP GR UR STRIP: 1.01 (ref 1–1.03)
SPECIMEN DESCRIPTION: NORMAL
UROBILINOGEN UR STRIP-ACNC: 0.2 EU/DL (ref 0–1)
WBC #/AREA URNS HPF: ABNORMAL /HPF
WBC OTHER # BLD: 6 K/UL (ref 4.5–11.5)

## 2024-01-11 PROCEDURE — 2580000003 HC RX 258: Performed by: FAMILY MEDICINE

## 2024-01-11 PROCEDURE — 93010 ELECTROCARDIOGRAM REPORT: CPT | Performed by: INTERNAL MEDICINE

## 2024-01-11 PROCEDURE — 87086 URINE CULTURE/COLONY COUNT: CPT

## 2024-01-11 PROCEDURE — 71045 X-RAY EXAM CHEST 1 VIEW: CPT

## 2024-01-11 PROCEDURE — 99285 EMERGENCY DEPT VISIT HI MDM: CPT

## 2024-01-11 PROCEDURE — 87502 INFLUENZA DNA AMP PROBE: CPT

## 2024-01-11 PROCEDURE — 87635 SARS-COV-2 COVID-19 AMP PRB: CPT

## 2024-01-11 PROCEDURE — 85025 COMPLETE CBC W/AUTO DIFF WBC: CPT

## 2024-01-11 PROCEDURE — 93005 ELECTROCARDIOGRAM TRACING: CPT | Performed by: EMERGENCY MEDICINE

## 2024-01-11 PROCEDURE — 6370000000 HC RX 637 (ALT 250 FOR IP): Performed by: EMERGENCY MEDICINE

## 2024-01-11 PROCEDURE — 80053 COMPREHEN METABOLIC PANEL: CPT

## 2024-01-11 PROCEDURE — 1200000000 HC SEMI PRIVATE

## 2024-01-11 PROCEDURE — 81001 URINALYSIS AUTO W/SCOPE: CPT

## 2024-01-11 PROCEDURE — 6370000000 HC RX 637 (ALT 250 FOR IP): Performed by: FAMILY MEDICINE

## 2024-01-11 PROCEDURE — 87088 URINE BACTERIA CULTURE: CPT

## 2024-01-11 PROCEDURE — 70450 CT HEAD/BRAIN W/O DYE: CPT

## 2024-01-11 PROCEDURE — 80185 ASSAY OF PHENYTOIN TOTAL: CPT

## 2024-01-11 RX ORDER — SODIUM CHLORIDE 9 MG/ML
INJECTION, SOLUTION INTRAVENOUS CONTINUOUS
Status: DISCONTINUED | OUTPATIENT
Start: 2024-01-11 | End: 2024-01-13

## 2024-01-11 RX ORDER — POTASSIUM CHLORIDE 20 MEQ/1
20 TABLET, EXTENDED RELEASE ORAL 2 TIMES DAILY WITH MEALS
Status: DISCONTINUED | OUTPATIENT
Start: 2024-01-11 | End: 2024-01-14 | Stop reason: HOSPADM

## 2024-01-11 RX ORDER — MAGNESIUM SULFATE IN WATER 40 MG/ML
2000 INJECTION, SOLUTION INTRAVENOUS PRN
Status: DISCONTINUED | OUTPATIENT
Start: 2024-01-11 | End: 2024-01-14 | Stop reason: HOSPADM

## 2024-01-11 RX ORDER — PROPRANOLOL HYDROCHLORIDE 20 MG/1
20 TABLET ORAL 3 TIMES DAILY
Status: DISCONTINUED | OUTPATIENT
Start: 2024-01-11 | End: 2024-01-14 | Stop reason: HOSPADM

## 2024-01-11 RX ORDER — SODIUM CHLORIDE 9 MG/ML
INJECTION, SOLUTION INTRAVENOUS PRN
Status: DISCONTINUED | OUTPATIENT
Start: 2024-01-11 | End: 2024-01-14 | Stop reason: HOSPADM

## 2024-01-11 RX ORDER — PANTOPRAZOLE SODIUM 40 MG/1
40 TABLET, DELAYED RELEASE ORAL
Status: DISCONTINUED | OUTPATIENT
Start: 2024-01-12 | End: 2024-01-14 | Stop reason: HOSPADM

## 2024-01-11 RX ORDER — POTASSIUM CHLORIDE 20 MEQ/1
40 TABLET, EXTENDED RELEASE ORAL PRN
Status: DISCONTINUED | OUTPATIENT
Start: 2024-01-11 | End: 2024-01-14 | Stop reason: HOSPADM

## 2024-01-11 RX ORDER — ATORVASTATIN CALCIUM 10 MG/1
10 TABLET, FILM COATED ORAL NIGHTLY
Status: DISCONTINUED | OUTPATIENT
Start: 2024-01-11 | End: 2024-01-14 | Stop reason: HOSPADM

## 2024-01-11 RX ORDER — CEFDINIR 300 MG/1
300 CAPSULE ORAL ONCE
Status: COMPLETED | OUTPATIENT
Start: 2024-01-11 | End: 2024-01-11

## 2024-01-11 RX ORDER — SODIUM CHLORIDE 0.9 % (FLUSH) 0.9 %
5-40 SYRINGE (ML) INJECTION EVERY 12 HOURS SCHEDULED
Status: DISCONTINUED | OUTPATIENT
Start: 2024-01-11 | End: 2024-01-14 | Stop reason: HOSPADM

## 2024-01-11 RX ORDER — POTASSIUM CHLORIDE 7.45 MG/ML
10 INJECTION INTRAVENOUS PRN
Status: DISCONTINUED | OUTPATIENT
Start: 2024-01-11 | End: 2024-01-14 | Stop reason: HOSPADM

## 2024-01-11 RX ORDER — POLYETHYLENE GLYCOL 3350 17 G/17G
17 POWDER, FOR SOLUTION ORAL DAILY PRN
Status: DISCONTINUED | OUTPATIENT
Start: 2024-01-11 | End: 2024-01-14 | Stop reason: HOSPADM

## 2024-01-11 RX ORDER — FUROSEMIDE 40 MG/1
40 TABLET ORAL DAILY
Status: DISCONTINUED | OUTPATIENT
Start: 2024-01-11 | End: 2024-01-14 | Stop reason: HOSPADM

## 2024-01-11 RX ORDER — SODIUM CHLORIDE 0.9 % (FLUSH) 0.9 %
10 SYRINGE (ML) INJECTION PRN
Status: DISCONTINUED | OUTPATIENT
Start: 2024-01-11 | End: 2024-01-14 | Stop reason: HOSPADM

## 2024-01-11 RX ORDER — ACETAMINOPHEN 650 MG/1
650 SUPPOSITORY RECTAL EVERY 6 HOURS PRN
Status: DISCONTINUED | OUTPATIENT
Start: 2024-01-11 | End: 2024-01-14 | Stop reason: HOSPADM

## 2024-01-11 RX ORDER — PREGABALIN 25 MG/1
25 CAPSULE ORAL NIGHTLY
Status: DISCONTINUED | OUTPATIENT
Start: 2024-01-11 | End: 2024-01-14 | Stop reason: HOSPADM

## 2024-01-11 RX ORDER — ONDANSETRON 2 MG/ML
4 INJECTION INTRAMUSCULAR; INTRAVENOUS EVERY 6 HOURS PRN
Status: DISCONTINUED | OUTPATIENT
Start: 2024-01-11 | End: 2024-01-14 | Stop reason: HOSPADM

## 2024-01-11 RX ORDER — DOXEPIN HYDROCHLORIDE 50 MG/1
50 CAPSULE ORAL NIGHTLY
Status: DISCONTINUED | OUTPATIENT
Start: 2024-01-11 | End: 2024-01-14 | Stop reason: HOSPADM

## 2024-01-11 RX ORDER — ACETAMINOPHEN 325 MG/1
650 TABLET ORAL EVERY 6 HOURS PRN
Status: DISCONTINUED | OUTPATIENT
Start: 2024-01-11 | End: 2024-01-14 | Stop reason: HOSPADM

## 2024-01-11 RX ORDER — PHENYTOIN SODIUM 100 MG/1
300 CAPSULE, EXTENDED RELEASE ORAL DAILY
Status: DISCONTINUED | OUTPATIENT
Start: 2024-01-11 | End: 2024-01-14 | Stop reason: HOSPADM

## 2024-01-11 RX ORDER — LEVOTHYROXINE SODIUM 0.03 MG/1
25 TABLET ORAL DAILY
Status: DISCONTINUED | OUTPATIENT
Start: 2024-01-11 | End: 2024-01-14 | Stop reason: HOSPADM

## 2024-01-11 RX ORDER — ONDANSETRON 4 MG/1
4 TABLET, ORALLY DISINTEGRATING ORAL EVERY 8 HOURS PRN
Status: DISCONTINUED | OUTPATIENT
Start: 2024-01-11 | End: 2024-01-14 | Stop reason: HOSPADM

## 2024-01-11 RX ADMIN — SODIUM CHLORIDE: 9 INJECTION, SOLUTION INTRAVENOUS at 19:58

## 2024-01-11 RX ADMIN — APIXABAN 5 MG: 5 TABLET, FILM COATED ORAL at 20:02

## 2024-01-11 RX ADMIN — PROPRANOLOL HYDROCHLORIDE 20 MG: 20 TABLET ORAL at 20:02

## 2024-01-11 RX ADMIN — SODIUM CHLORIDE, PRESERVATIVE FREE 10 ML: 5 INJECTION INTRAVENOUS at 20:03

## 2024-01-11 RX ADMIN — PHENYTOIN SODIUM 300 MG: 100 CAPSULE ORAL at 20:25

## 2024-01-11 RX ADMIN — CEFDINIR 300 MG: 300 CAPSULE ORAL at 15:11

## 2024-01-11 RX ADMIN — DOXEPIN HYDROCHLORIDE 50 MG: 50 CAPSULE ORAL at 20:02

## 2024-01-11 RX ADMIN — ATORVASTATIN CALCIUM 10 MG: 10 TABLET, FILM COATED ORAL at 20:02

## 2024-01-11 ASSESSMENT — PAIN - FUNCTIONAL ASSESSMENT: PAIN_FUNCTIONAL_ASSESSMENT: NONE - DENIES PAIN

## 2024-01-11 NOTE — ED PROVIDER NOTES
HPI:  24, Time: 10:51 AM SYLVIE Guadarrama is a 83 y.o. female presenting to the ED for altered mental status.  Patient presents from home.  She states that she has been hearing music in the apartment above her and hearing voices.  She also states that she is feeling jerky but she has not had any seizures in several years.  She denies any falls or trauma.  She denies headache, neck pain, chest pain, shortness of breath, abdominal pain, nausea, vomiting, and diarrhea.  She states that her Dilantin dose was recently increased.  She also reports a new nonproductive cough beginning a few days ago.    Resident physician spoke with the patient's son on the phone. He states she has been having ongoing hallucinations for one year, but the symptoms were recently exacerbated. He is concerned symptoms are exacerbated due to UTI or recent medication change with dilantin.     --------------------------------------------- PAST HISTORY ---------------------------------------------  Past Medical History:  has a past medical history of Anxiety and depression, Arthritis, Asthma, Chronic back pain, Chronic kidney disease (CKD), stage II (mild), Chronic osteomyelitis of lumbar spine (HCC), Foot pain, HTN (hypertension), benign, Hx of blood clots, Hx of migraines, Hyperlipidemia, Hypothyroidism, Hypothyroidism, Neuropathy of leg, Other postoperative infection, Radiculopathy, lumbar region, Seizures (HCC), Sleeping difficulties, Spinal stenosis of lumbar region, Synovial cyst of lumbar spine, Thrombocytopenia (HCC), Tremors of nervous system, Vitamin D deficiency, and Wears dentures.    Past Surgical History:  has a past surgical history that includes lumbar laminectomy (); cervical fusion (?); joint replacement (); Spine surgery ( & ); Spine surgery (); Spine surgery (); Tonsillectomy; Colonoscopy;  section; Cholecystectomy (); eye surgery (Bilateral, ); back surgery; Spine 
tenderness.   Pulmonary: Lungs clear to auscultation bilaterally.  Cardiovascular:  Regular rate.  Regular rhythm. +2 distal pulses  Chest: no chest wall tenderness  Abdomen: Soft.  Non tender. Non distended.     Musculoskeletal: Moves all extremities x 4.   Warm and well perfused.  No clubbing, cyanosis, or edema.   Compartments are soft and compressible.   Capillary refill <3 seconds.  Skin: warm and dry. No rashes.   Neurologic: GCS 15.   Facial symmetry.   Speech clear.    No meningeal signs.  Psych: Normal Affect    -------------------------------------------------- RESULTS -------------------------------------------------  I have personally reviewed all laboratory and imaging results for this patient. Results are listed below.     LABS:  Results for orders placed or performed during the hospital encounter of 01/11/24   COVID-19, Rapid    Specimen: Nasopharyngeal Swab   Result Value Ref Range    Specimen Description .NASOPHARYNGEAL SWAB     SARS-CoV-2, Rapid Not Detected Not Detected   Influenza A+B, PCR    Specimen: Nasopharyngeal   Result Value Ref Range    Influenza A by PCR Not Detected Not Detected    Influenza B by PCR Not Detected Not Detected   Urinalysis with Microscopic   Result Value Ref Range    Color, UA Yellow Yellow    Turbidity UA Clear Clear    Glucose, Ur NEGATIVE NEGATIVE mg/dL    Bilirubin Urine NEGATIVE NEGATIVE    Ketones, Urine NEGATIVE NEGATIVE mg/dL    Specific Gravity, UA 1.015 1.005 - 1.030    Urine Hgb NEGATIVE NEGATIVE    pH, UA 5.5 5.0 - 9.0    Protein, UA NEGATIVE NEGATIVE mg/dL    Urobilinogen, Urine 0.2 0.0 - 1.0 EU/dL    Nitrite, Urine POSITIVE (A) NEGATIVE    Leukocyte Esterase, Urine SMALL (A) NEGATIVE    WBC, UA 0 TO 5 0 TO 5 /HPF    RBC, UA 0 TO 2 0 TO 2 /HPF    Casts UA 0 TO 2 HYALINE (A) None /LPF    Epithelial Cells UA 0 TO 2 /HPF    Bacteria, UA 2+ (A) None   CBC with Auto Differential   Result Value Ref Range    WBC 6.0 4.5 - 11.5 k/uL    RBC 4.78 3.50 - 5.50 m/uL

## 2024-01-11 NOTE — CARE COORDINATION
Social Work/Transition of Care:    Pt presents to the ED for evaluation due to Hallucinations Pt reported to this worker she continually hears music in her head 24/7.  SW consulted due to son reporting hallucinations and bizarre behavior.  Pt lives alone in an apartment and her son lives in [place] and is very supportive.   Pt son reported pt has been a  for 5 years the anniversary of his death is coming up and this may be triggering pt.    Pt has recently started to have increased seizures and recently had a appt with her neurologist pts son reported a medication change.  Pt continues to have periods of tremors and memory loss.  Pt has a WW, Cane and 3-1 commode. PT requests information on Medic Alert prior to discharge.  Pt to be medically admitted and further evaluated.      Electronically signed by GUERO loco on 1/11/2024 at 6:52 PM

## 2024-01-12 LAB
ALBUMIN SERPL-MCNC: 3.5 G/DL (ref 3.5–5.2)
ALP SERPL-CCNC: 190 U/L (ref 35–104)
ALT SERPL-CCNC: 40 U/L (ref 0–32)
ANION GAP SERPL CALCULATED.3IONS-SCNC: 10 MMOL/L (ref 7–16)
AST SERPL-CCNC: 37 U/L (ref 0–31)
BILIRUB SERPL-MCNC: 0.3 MG/DL (ref 0–1.2)
BUN SERPL-MCNC: 20 MG/DL (ref 6–23)
CALCIUM SERPL-MCNC: 9.1 MG/DL (ref 8.6–10.2)
CHLORIDE SERPL-SCNC: 104 MMOL/L (ref 98–107)
CO2 SERPL-SCNC: 25 MMOL/L (ref 22–29)
CREAT SERPL-MCNC: 0.8 MG/DL (ref 0.5–1)
GFR SERPL CREATININE-BSD FRML MDRD: >60 ML/MIN/1.73M2
GLUCOSE SERPL-MCNC: 97 MG/DL (ref 74–99)
POTASSIUM SERPL-SCNC: 4.3 MMOL/L (ref 3.5–5)
PROT SERPL-MCNC: 6.9 G/DL (ref 6.4–8.3)
SODIUM SERPL-SCNC: 139 MMOL/L (ref 132–146)

## 2024-01-12 PROCEDURE — 6360000002 HC RX W HCPCS: Performed by: FAMILY MEDICINE

## 2024-01-12 PROCEDURE — 97165 OT EVAL LOW COMPLEX 30 MIN: CPT

## 2024-01-12 PROCEDURE — 2580000003 HC RX 258: Performed by: FAMILY MEDICINE

## 2024-01-12 PROCEDURE — 6370000000 HC RX 637 (ALT 250 FOR IP): Performed by: FAMILY MEDICINE

## 2024-01-12 PROCEDURE — 1200000000 HC SEMI PRIVATE

## 2024-01-12 PROCEDURE — 80053 COMPREHEN METABOLIC PANEL: CPT

## 2024-01-12 PROCEDURE — 97530 THERAPEUTIC ACTIVITIES: CPT

## 2024-01-12 RX ORDER — LOSARTAN POTASSIUM 25 MG/1
25 TABLET ORAL DAILY
Status: DISCONTINUED | OUTPATIENT
Start: 2024-01-12 | End: 2024-01-14 | Stop reason: HOSPADM

## 2024-01-12 RX ORDER — OXYCODONE HYDROCHLORIDE 15 MG/1
15 TABLET ORAL EVERY 6 HOURS PRN
Status: DISCONTINUED | OUTPATIENT
Start: 2024-01-12 | End: 2024-01-14 | Stop reason: HOSPADM

## 2024-01-12 RX ORDER — DIAZEPAM 5 MG/1
10 TABLET ORAL EVERY 12 HOURS PRN
Status: DISCONTINUED | OUTPATIENT
Start: 2024-01-12 | End: 2024-01-14 | Stop reason: HOSPADM

## 2024-01-12 RX ADMIN — ATORVASTATIN CALCIUM 10 MG: 10 TABLET, FILM COATED ORAL at 22:06

## 2024-01-12 RX ADMIN — PANTOPRAZOLE SODIUM 40 MG: 40 TABLET, DELAYED RELEASE ORAL at 07:36

## 2024-01-12 RX ADMIN — OXYCODONE 15 MG: 15 TABLET ORAL at 22:08

## 2024-01-12 RX ADMIN — DOXEPIN HYDROCHLORIDE 50 MG: 50 CAPSULE ORAL at 22:07

## 2024-01-12 RX ADMIN — SODIUM CHLORIDE: 9 INJECTION, SOLUTION INTRAVENOUS at 07:35

## 2024-01-12 RX ADMIN — APIXABAN 5 MG: 5 TABLET, FILM COATED ORAL at 09:27

## 2024-01-12 RX ADMIN — LOSARTAN POTASSIUM 25 MG: 25 TABLET, FILM COATED ORAL at 11:20

## 2024-01-12 RX ADMIN — PROPRANOLOL HYDROCHLORIDE 20 MG: 20 TABLET ORAL at 22:07

## 2024-01-12 RX ADMIN — PROPRANOLOL HYDROCHLORIDE 20 MG: 20 TABLET ORAL at 13:49

## 2024-01-12 RX ADMIN — PREGABALIN 25 MG: 25 CAPSULE ORAL at 22:06

## 2024-01-12 RX ADMIN — POTASSIUM CHLORIDE 20 MEQ: 1500 TABLET, EXTENDED RELEASE ORAL at 09:27

## 2024-01-12 RX ADMIN — OXYCODONE 15 MG: 15 TABLET ORAL at 11:20

## 2024-01-12 RX ADMIN — WATER 1000 MG: 1 INJECTION INTRAMUSCULAR; INTRAVENOUS; SUBCUTANEOUS at 07:34

## 2024-01-12 RX ADMIN — SODIUM CHLORIDE, PRESERVATIVE FREE 10 ML: 5 INJECTION INTRAVENOUS at 22:07

## 2024-01-12 RX ADMIN — LEVOTHYROXINE SODIUM 25 MCG: 25 TABLET ORAL at 09:27

## 2024-01-12 RX ADMIN — FUROSEMIDE 40 MG: 40 TABLET ORAL at 09:27

## 2024-01-12 RX ADMIN — PHENYTOIN SODIUM 300 MG: 100 CAPSULE ORAL at 09:27

## 2024-01-12 RX ADMIN — POTASSIUM CHLORIDE 20 MEQ: 1500 TABLET, EXTENDED RELEASE ORAL at 17:30

## 2024-01-12 RX ADMIN — APIXABAN 5 MG: 5 TABLET, FILM COATED ORAL at 22:07

## 2024-01-12 RX ADMIN — PROPRANOLOL HYDROCHLORIDE 20 MG: 20 TABLET ORAL at 09:28

## 2024-01-12 ASSESSMENT — PAIN DESCRIPTION - ORIENTATION: ORIENTATION: LEFT;RIGHT;LOWER

## 2024-01-12 ASSESSMENT — PAIN SCALES - GENERAL: PAINLEVEL_OUTOF10: 7

## 2024-01-12 ASSESSMENT — PAIN DESCRIPTION - DESCRIPTORS: DESCRIPTORS: DISCOMFORT

## 2024-01-12 ASSESSMENT — PAIN DESCRIPTION - LOCATION: LOCATION: BACK;HIP

## 2024-01-12 NOTE — PLAN OF CARE
Problem: ABCDS Injury Assessment  Goal: Absence of physical injury  1/12/2024 1051 by Pedrito Bear RN  Outcome: Progressing  1/12/2024 0142 by Bryce Torres RN  Outcome: Progressing     Problem: Discharge Planning  Goal: Discharge to home or other facility with appropriate resources  1/12/2024 1051 by Pedrito Bear RN  Outcome: Progressing  1/12/2024 0142 by Bryce Torres RN  Outcome: Progressing     Problem: Safety - Adult  Goal: Free from fall injury  1/12/2024 1051 by Pedrito Bear RN  Outcome: Progressing  1/12/2024 0142 by Bryce Torres RN  Outcome: Progressing

## 2024-01-12 NOTE — H&P
[facility]  History and Physical      CHIEF COMPLAINT:    Chief Complaint   Patient presents with    Altered Mental Status     Hallucinations, pt states that she has been hearing music that is not there, denies hearing voices, denies SI/HI, a/ox4, tremors, pcp recently increased dilantin dosage         Patient of Marvin Briggs MD presents with:  AMS (altered mental status)    History of Present Illness:   Patient is an 83-year-old female with a past medical history of anxiety and depression, arthritis, asthma, CKD, hypertension, hyperlipidemia, hypothyroidism, seizures, thrombocytopenia, and vitamin D deficiency.  Patient presented to the ED with complaints of altered mental status.  Patient comes from home.  Patient states that she has been hearing music in the apartment above her and hearing voices.  Patient also states that she is feeling jerking but she has had not had any seizures in the past several years.  Patient denies any headache, neck pain, chest pain, shortness of breath.  Patient does take Dilantin and her dose was recently increased.  Patient also reports a new nonproductive cough beginning a few days ago.  ER workup revealed urinalysis positive for UTI, slightly elevated liver enzymes.  Patient was started on IV antibiotics and admitted to MedSur unit for further treatment.      REVIEW OF SYSTEMS:  Pertinent negatives are above in HPI.  10 point ROS otherwise negative.      Past Medical History:   Diagnosis Date    Anxiety and depression 7/13/2016    Arthritis     osteo    Asthma     has chronic couch    Chronic back pain     Chronic kidney disease (CKD), stage II (mild) 04/19/2012    Creatinine around 1.3 to 1.4 in April 2012 secondary to IV antibiotics following back surgery.    Chronic osteomyelitis of lumbar spine (HCC)     note on chart from Dr. Centeno dated 8/17/2017    Foot pain     after back surgery / chronic    HTN (hypertension), benign 1/10/2015    Hx of

## 2024-01-12 NOTE — CONSULTS
Consult received for \"hallucinations\". Patient is 82 yo female admitted from home on 1/11/24 due to UTI after presenting to ED with new-onset auditory hallucinations and altered mental status. Patient has been treated with anxiolytics in the past but otherwise there is no apparent significant psychiatric history. I suspect presentation is all delirium-related and symptoms should clear as infection is treated. In the meantime if hallucinations are particularly bothersome to patient a low dose of risperidone can be given and QTc is acceptable. No indication for full psychiatric consult at this time but will follow case peripherally.    Electronically signed by Raghu Rice MD on 1/12/2024 at 10:05 AM

## 2024-01-12 NOTE — CARE COORDINATION
Social work / Discharge Planning:          Social work met with patient for initial assessment and explained role.     She lives alone in an apartment and ambulates independently.      Patient owns a ww, cane and 3-in-1 BSC but does not use them.    She had a past stay at [facility] and has used [facility] .     Per IDR, she is ambulating independently in the room with nursing present.     Patient was alert and oriented answering questions appropriately.    No discharge needs identified at this time.    Electronically signed by GUERO Ponce on 1/12/2024 at 10:12 AM

## 2024-01-12 NOTE — ACP (ADVANCE CARE PLANNING)
Advance Care Planning   Healthcare Decision Maker:    Primary Decision Maker: Santhosh Arita (Poa) - Child - [phone]    Click here to complete Healthcare Decision Makers including selection of the Healthcare Decision Maker Relationship (ie \"Primary\").

## 2024-01-13 LAB
ALBUMIN SERPL-MCNC: 3.3 G/DL (ref 3.5–5.2)
ALP SERPL-CCNC: 187 U/L (ref 35–104)
ALT SERPL-CCNC: 34 U/L (ref 0–32)
ANION GAP SERPL CALCULATED.3IONS-SCNC: 9 MMOL/L (ref 7–16)
AST SERPL-CCNC: 31 U/L (ref 0–31)
BASOPHILS # BLD: 0.09 K/UL (ref 0–0.2)
BASOPHILS NFR BLD: 1 % (ref 0–2)
BILIRUB SERPL-MCNC: 0.3 MG/DL (ref 0–1.2)
BUN SERPL-MCNC: 20 MG/DL (ref 6–23)
CALCIUM SERPL-MCNC: 8.8 MG/DL (ref 8.6–10.2)
CHLORIDE SERPL-SCNC: 104 MMOL/L (ref 98–107)
CO2 SERPL-SCNC: 25 MMOL/L (ref 22–29)
CREAT SERPL-MCNC: 0.8 MG/DL (ref 0.5–1)
EOSINOPHIL # BLD: 0.4 K/UL (ref 0.05–0.5)
EOSINOPHILS RELATIVE PERCENT: 5 % (ref 0–6)
ERYTHROCYTE [DISTWIDTH] IN BLOOD BY AUTOMATED COUNT: 13.2 % (ref 11.5–15)
GFR SERPL CREATININE-BSD FRML MDRD: >60 ML/MIN/1.73M2
GLUCOSE SERPL-MCNC: 100 MG/DL (ref 74–99)
HCT VFR BLD AUTO: 40.3 % (ref 34–48)
HGB BLD-MCNC: 13.1 G/DL (ref 11.5–15.5)
IMM GRANULOCYTES # BLD AUTO: <0.03 K/UL (ref 0–0.58)
IMM GRANULOCYTES NFR BLD: 0 % (ref 0–5)
LYMPHOCYTES NFR BLD: 3.18 K/UL (ref 1.5–4)
LYMPHOCYTES RELATIVE PERCENT: 41 % (ref 20–42)
MCH RBC QN AUTO: 30.3 PG (ref 26–35)
MCHC RBC AUTO-ENTMCNC: 32.5 G/DL (ref 32–34.5)
MCV RBC AUTO: 93.1 FL (ref 80–99.9)
MICROORGANISM SPEC CULT: ABNORMAL
MONOCYTES NFR BLD: 0.87 K/UL (ref 0.1–0.95)
MONOCYTES NFR BLD: 11 % (ref 2–12)
NEUTROPHILS NFR BLD: 41 % (ref 43–80)
NEUTS SEG NFR BLD: 3.16 K/UL (ref 1.8–7.3)
PLATELET # BLD AUTO: 181 K/UL (ref 130–450)
PMV BLD AUTO: 9.9 FL (ref 7–12)
POTASSIUM SERPL-SCNC: 4.3 MMOL/L (ref 3.5–5)
PROT SERPL-MCNC: 6.4 G/DL (ref 6.4–8.3)
RBC # BLD AUTO: 4.33 M/UL (ref 3.5–5.5)
SODIUM SERPL-SCNC: 138 MMOL/L (ref 132–146)
SPECIMEN DESCRIPTION: ABNORMAL
WBC OTHER # BLD: 7.7 K/UL (ref 4.5–11.5)

## 2024-01-13 PROCEDURE — 6370000000 HC RX 637 (ALT 250 FOR IP): Performed by: FAMILY MEDICINE

## 2024-01-13 PROCEDURE — P9047 ALBUMIN (HUMAN), 25%, 50ML: HCPCS | Performed by: NURSE PRACTITIONER

## 2024-01-13 PROCEDURE — 2580000003 HC RX 258: Performed by: FAMILY MEDICINE

## 2024-01-13 PROCEDURE — 6360000002 HC RX W HCPCS: Performed by: NURSE PRACTITIONER

## 2024-01-13 PROCEDURE — 85025 COMPLETE CBC W/AUTO DIFF WBC: CPT

## 2024-01-13 PROCEDURE — 97161 PT EVAL LOW COMPLEX 20 MIN: CPT

## 2024-01-13 PROCEDURE — 80053 COMPREHEN METABOLIC PANEL: CPT

## 2024-01-13 PROCEDURE — 1200000000 HC SEMI PRIVATE

## 2024-01-13 PROCEDURE — 6360000002 HC RX W HCPCS: Performed by: FAMILY MEDICINE

## 2024-01-13 RX ORDER — ALBUMIN (HUMAN) 12.5 G/50ML
25 SOLUTION INTRAVENOUS ONCE
Status: COMPLETED | OUTPATIENT
Start: 2024-01-13 | End: 2024-01-13

## 2024-01-13 RX ADMIN — PROPRANOLOL HYDROCHLORIDE 20 MG: 20 TABLET ORAL at 22:55

## 2024-01-13 RX ADMIN — OXYCODONE 15 MG: 15 TABLET ORAL at 22:55

## 2024-01-13 RX ADMIN — PANTOPRAZOLE SODIUM 40 MG: 40 TABLET, DELAYED RELEASE ORAL at 06:13

## 2024-01-13 RX ADMIN — PROPRANOLOL HYDROCHLORIDE 20 MG: 20 TABLET ORAL at 13:28

## 2024-01-13 RX ADMIN — POTASSIUM CHLORIDE 20 MEQ: 1500 TABLET, EXTENDED RELEASE ORAL at 08:01

## 2024-01-13 RX ADMIN — SODIUM CHLORIDE: 9 INJECTION, SOLUTION INTRAVENOUS at 12:42

## 2024-01-13 RX ADMIN — PHENYTOIN SODIUM 300 MG: 100 CAPSULE ORAL at 08:00

## 2024-01-13 RX ADMIN — ALBUMIN (HUMAN) 25 G: 0.25 INJECTION, SOLUTION INTRAVENOUS at 11:42

## 2024-01-13 RX ADMIN — DOXEPIN HYDROCHLORIDE 50 MG: 50 CAPSULE ORAL at 22:55

## 2024-01-13 RX ADMIN — APIXABAN 5 MG: 5 TABLET, FILM COATED ORAL at 22:55

## 2024-01-13 RX ADMIN — POTASSIUM CHLORIDE 20 MEQ: 1500 TABLET, EXTENDED RELEASE ORAL at 18:06

## 2024-01-13 RX ADMIN — WATER 1000 MG: 1 INJECTION INTRAMUSCULAR; INTRAVENOUS; SUBCUTANEOUS at 06:13

## 2024-01-13 RX ADMIN — APIXABAN 5 MG: 5 TABLET, FILM COATED ORAL at 08:00

## 2024-01-13 RX ADMIN — PREGABALIN 25 MG: 25 CAPSULE ORAL at 22:55

## 2024-01-13 RX ADMIN — ATORVASTATIN CALCIUM 10 MG: 10 TABLET, FILM COATED ORAL at 22:55

## 2024-01-13 RX ADMIN — SODIUM CHLORIDE, PRESERVATIVE FREE 10 ML: 5 INJECTION INTRAVENOUS at 22:59

## 2024-01-13 RX ADMIN — SODIUM CHLORIDE, PRESERVATIVE FREE 10 ML: 5 INJECTION INTRAVENOUS at 08:02

## 2024-01-13 RX ADMIN — LEVOTHYROXINE SODIUM 25 MCG: 25 TABLET ORAL at 08:01

## 2024-01-13 RX ADMIN — OXYCODONE 15 MG: 15 TABLET ORAL at 13:33

## 2024-01-13 ASSESSMENT — PAIN SCALES - GENERAL
PAINLEVEL_OUTOF10: 7
PAINLEVEL_OUTOF10: 8

## 2024-01-13 ASSESSMENT — PAIN DESCRIPTION - ORIENTATION: ORIENTATION: RIGHT;LEFT

## 2024-01-13 ASSESSMENT — PAIN DESCRIPTION - LOCATION: LOCATION: HIP

## 2024-01-13 ASSESSMENT — PAIN DESCRIPTION - DESCRIPTORS: DESCRIPTORS: SHARP

## 2024-01-13 ASSESSMENT — PAIN - FUNCTIONAL ASSESSMENT: PAIN_FUNCTIONAL_ASSESSMENT: ACTIVITIES ARE NOT PREVENTED

## 2024-01-13 NOTE — PROGRESS NOTES
[facility]                                Progress note    Subjective:    The patient is awake and alert.    Resting in bed with family at bedside  Mentation improved    Objective:    BP (!) 150/77   Pulse 87   Temp 97.7 °F (36.5 °C) (Oral)   Resp 16   Ht 1.524 m (5')   Wt 80.2 kg (176 lb 11.2 oz)   SpO2 91%   BMI 34.51 kg/m²     In: 240 [P.O.:240]  Out: 1   In: 240   Out: 1 [Urine:1]    General appearance: NAD, conversant  HEENT: AT/NC, MMM  Neck: FROM, supple  Lungs: Clear to auscultation  CV: RRR, no MRGs  Vasc: Radial pulses 2+  Abdomen: Soft, non-tender; no masses or HSM  Extremities: No peripheral edema or digital cyanosis  Skin: no rash, lesions or ulcers  Psych: Alert and oriented to person, place and time  Neuro: Alert and interactive     Recent Labs     01/11/24  1125 01/13/24  0242   WBC 6.0 7.7   HGB 14.4 13.1   HCT 44.1 40.3    181       Recent Labs     01/11/24  1125 01/12/24  0131 01/13/24  0242    139 138   K 4.6 4.3 4.3    104 104   CO2 24 25 25   BUN 23 20 20   CREATININE 0.8 0.8 0.8   CALCIUM 9.1 9.1 8.8       Assessment:    Principal Problem:    AMS (altered mental status)  Resolved Problems:    * No resolved hospital problems. *      Plan:  83-year-old female admitted to MedSurg unit with     Altered mental status-UTI  Monitor labs  IV hydration  Rocephin 1 g every 24 hours  Consult social work for likely placement  Await cultures     Elevated liver enzymes  --avoid hepatotoxins where able  --follow/trend liver enzyme values     1/13/24:  -Urine culture growing E. coli sensitive to Rocephin, tomorrow will be third dose and can be transition to p.o.  -IV albumin 25 g x 1 with improvement in Bps  -DC IVF  -Tolerating oral intake  -Mentation improving significantly  -Discharge tomorrow if all remains the same    Code status: Full  Consultants: None     DVT Prophylaxis Eliquis  PT/OT  Discharge planning       I have spent a total time of 30 minutes of 
4 Eyes Skin Assessment     NAME:  Lucinda Guadarrama  YOB: 1940  MEDICAL RECORD NUMBER:  [id number]    The patient is being assessed for  Admission    I agree that at least one RN has performed a thorough Head to Toe Skin Assessment on the patient. ALL assessment sites listed below have been assessed.      Areas assessed by both nurses:    Head, Face, Ears, Shoulders, Back, Chest, Arms, Elbows, Hands, Sacrum. Buttock, Coccyx, Ischium, Legs. Feet and Heels, and Under Medical Devices         Does the Patient have a Wound? No noted wound(s)       Santos Prevention initiated by RN: Yes  Wound Care Orders initiated by RN: No    Pressure Injury (Stage 3,4, Unstageable, DTI, NWPT, and Complex wounds) if present, place Wound referral order by RN under : No    New Ostomies, if present place, Ostomy referral order under : No     Nurse 1 eSignature: Electronically signed by Bryce Torres RN on 1/12/24 at 1:57 AM EST    **SHARE this note so that the co-signing nurse can place an eSignature**    Nurse 2 eSignature: Electronically signed by Maddie Carmona on 1/12/24 at 4:31 AM EST   
Database initiated. Patient is A&O at the moment. She is  independent from home alone. States she uses no assistive devices and is RA at baseline. She has fallen recently.     
Per nursing, patient ambulates well in room.  Fall protocols are in place.    
Physical Therapy  Facility/Department: [address] MED SURG/TELE  Physical Therapy Initial Assessment    Name: Lucinda Guadarrama  : 1940  MRN: [id number]  Date of Service: 2024        Patient Diagnosis(es): The primary encounter diagnosis was Acute cystitis without hematuria. Diagnoses of Hallucinations and Altered mental status, unspecified altered mental status type were also pertinent to this visit.  Past Medical History:  has a past medical history of Anxiety and depression, Arthritis, Asthma, Chronic back pain, Chronic kidney disease (CKD), stage II (mild), Chronic osteomyelitis of lumbar spine (HCC), Foot pain, HTN (hypertension), benign, Hx of blood clots, Hx of migraines, Hyperlipidemia, Hypothyroidism, Hypothyroidism, Neuropathy of leg, Other postoperative infection, Radiculopathy, lumbar region, Seizures (HCC), Sleeping difficulties, Spinal stenosis of lumbar region, Synovial cyst of lumbar spine, Thrombocytopenia (HCC), Tremors of nervous system, Vitamin D deficiency, and Wears dentures.  Past Surgical History:  has a past surgical history that includes lumbar laminectomy (); cervical fusion (?); joint replacement (); Spine surgery ( & ); Spine surgery (); Spine surgery (); Tonsillectomy; Colonoscopy;  section; Cholecystectomy (); eye surgery (Bilateral, ); back surgery; Spine surgery (); Knee Arthroplasty (Right, 2017); pr egd transoral biopsy single/multiple (N/A, 6/15/2018); and pr colonoscopy flx dx w/collj spec when pfrmd (N/A, 6/15/2018).    Evaluating Therapist: Christy Johnson PT      Equipment Recommendation wheeled walker    Room #:  0527/0527-A  Diagnosis:  Hallucinations [R44.3]  Acute cystitis without hematuria [N30.00]  Altered mental status, unspecified altered mental status type [R41.82]  AMS (altered mental status) [R41.82]  PMHx/PSHx:  chronic osteomyelitis of lubar spine, neuropathy, HTn  Precautions:  falls      Social:  Pt 
Pt states that she is hearing music prior to administering pain medication pt pain level was at a 7, other neuros were intact   
ADLs  * Therapeutic exercise to improve motor endurance, ROM, and functional strength for ADLs/functional transfers  * Therapeutic activities to facilitate/challenge dynamic balance, stand tolerance for increased safety and independence with ADLs  * Positioning to improve skin integrity, interaction with environment and functional independence    Recommended Adaptive Equipment: TBD      Home Living: Lives alone, apartment, 1 story, No steps to enter. Laundry located on different floor.         Equipment owned: wheeled walker, w/c, 3:1 commode     Prior Level of Function: Modified [place] with ADLs and most IADLs. Has a friend who assists with laundry. Son is available to assist PRN; ambulated independently without AD PTA.     Pain Level: No c/o pain      Cognition: A&O: alert, pleasant, cooperative; Follows 3 step directions   Memory: fair+   Sequencing: fair+   Problem solving: fair    Judgement/safety: fair      Functional Assessment: AM-PAC Daily Activity Raw Score: 15/24   Initial Eval Status  Date: 1/12/24   Treatment Status  Date:  STGs = LTGs  Time frame: 10-14 days   Feeding Supervision     Independent    Grooming Minimal Assist     Supervision    UB Dressing Minimal Assist    Supervision    LB Dressing Moderate Assist     Supervision    Bathing Moderate Assist     Supervision    Toileting Moderate Assist     Supervision    Bed Mobility  Supine to sit:  N/A  Sit to supine:  N/A    Supine to sit: Independent   Sit to supine: Independent    Functional Transfers Sit to stand: Minimal Assist   Stand to sit: Minimal Assist     Transfer training with verbal cues for hand placement to improve safety.     Supervision   Functional Mobility Minimal Assist with wheeled walker to improve balance to/from bathroom, moderate verbal cues for walker sequence and safety.     Supervision with wheeled walker    Balance Sitting:     Static: good    Dynamic: good  Standing: fair with walker     Sitting:     Static: good

## 2024-01-14 VITALS
OXYGEN SATURATION: 95 % | WEIGHT: 176.7 LBS | HEIGHT: 60 IN | RESPIRATION RATE: 18 BRPM | DIASTOLIC BLOOD PRESSURE: 60 MMHG | HEART RATE: 81 BPM | TEMPERATURE: 98.4 F | BODY MASS INDEX: 34.69 KG/M2 | SYSTOLIC BLOOD PRESSURE: 123 MMHG

## 2024-01-14 PROCEDURE — 6370000000 HC RX 637 (ALT 250 FOR IP): Performed by: FAMILY MEDICINE

## 2024-01-14 PROCEDURE — 2580000003 HC RX 258: Performed by: FAMILY MEDICINE

## 2024-01-14 PROCEDURE — 6360000002 HC RX W HCPCS: Performed by: FAMILY MEDICINE

## 2024-01-14 RX ADMIN — FUROSEMIDE 40 MG: 40 TABLET ORAL at 08:53

## 2024-01-14 RX ADMIN — LEVOTHYROXINE SODIUM 25 MCG: 25 TABLET ORAL at 08:53

## 2024-01-14 RX ADMIN — PHENYTOIN SODIUM 300 MG: 100 CAPSULE ORAL at 08:53

## 2024-01-14 RX ADMIN — SODIUM CHLORIDE, PRESERVATIVE FREE 10 ML: 5 INJECTION INTRAVENOUS at 08:54

## 2024-01-14 RX ADMIN — POTASSIUM CHLORIDE 20 MEQ: 1500 TABLET, EXTENDED RELEASE ORAL at 08:53

## 2024-01-14 RX ADMIN — WATER 1000 MG: 1 INJECTION INTRAMUSCULAR; INTRAVENOUS; SUBCUTANEOUS at 06:36

## 2024-01-14 RX ADMIN — LOSARTAN POTASSIUM 25 MG: 25 TABLET, FILM COATED ORAL at 08:53

## 2024-01-14 RX ADMIN — APIXABAN 5 MG: 5 TABLET, FILM COATED ORAL at 08:53

## 2024-01-14 RX ADMIN — PANTOPRAZOLE SODIUM 40 MG: 40 TABLET, DELAYED RELEASE ORAL at 06:36

## 2024-01-14 RX ADMIN — PROPRANOLOL HYDROCHLORIDE 20 MG: 20 TABLET ORAL at 08:53

## 2024-01-14 NOTE — DISCHARGE SUMMARY
[facility]   Discharge summary   Patient ID:  Lucinda Guadarrama  [id number]  83 y.o.  1940    Admit date: 1/11/2024    Discharge date and time: 1/14/2024    Admission Diagnoses:   Patient Active Problem List   Diagnosis    Essential hypertension    Asthma    History of DVT (deep vein thrombosis)    Chest pain    DANAE (acute kidney injury) (HCC)    CKD (chronic kidney disease) stage 2, GFR 60-89 ml/min    Acquired hypothyroidism    History of epilepsy    Acute metabolic encephalopathy    RSV (respiratory syncytial virus pneumonia)    Obesity (BMI 30-39.9)    Bilateral pulmonary embolism (HCC)    Pulmonary embolism without acute cor pulmonale (HCC)    Pulmonary embolism, bilateral (HCC)    Hx of pulmonary embolus    Chronic obstructive pulmonary disease (HCC)    Anemia    Gastroesophageal reflux disease    Heart valve disease    Hypercholesterolemia    Migraine headache    AMS (altered mental status)       Discharge Diagnoses: AMS, uti    Consults: none    Procedures: none    Hospital Course:     83-year-old female admitted to MedSurg unit with     Altered mental status-UTI  Monitor labs  IV hydration  Rocephin 1 g every 24 hours  Consult social work for likely placement  Await cultures     Elevated liver enzymes  --avoid hepatotoxins where able  --follow/trend liver enzyme values     1/13/24:  -Urine culture growing E. coli sensitive to Rocephin, tomorrow will be third dose and can be transition to p.o.  -IV albumin 25 g x 1 with improvement in Bps  -DC IVF  -Tolerating oral intake  -Mentation improving significantly  -Discharge tomorrow if all remains the same    1/14/24:  -Completed tx for UTI  -F/u with pcp in 1 week of discharge  -Home with family on d/c     Recent Labs     01/11/24  1125 01/13/24  0242   WBC 6.0 7.7   HGB 14.4 13.1   HCT 44.1 40.3    181       Recent Labs     01/11/24  1125 01/12/24  0131 01/13/24  0242    139 138   K 4.6 4.3 4.3    104 104   CO2 24 25 25   BUN

## 2024-01-15 ENCOUNTER — HOSPITAL ENCOUNTER (INPATIENT)
Age: 84
LOS: 3 days | Discharge: NURSING, REHAB OR LONG-TERM CARE | DRG: 101 | End: 2024-01-18
Attending: STUDENT IN AN ORGANIZED HEALTH CARE EDUCATION/TRAINING PROGRAM | Admitting: INTERNAL MEDICINE
Payer: MEDICARE

## 2024-01-15 ENCOUNTER — APPOINTMENT (OUTPATIENT)
Dept: CT IMAGING | Age: 84
DRG: 101 | End: 2024-01-15
Payer: MEDICARE

## 2024-01-15 DIAGNOSIS — G40.109 PARTIAL SYMPTOMATIC EPILEPSY WITH SIMPLE PARTIAL SEIZURES, NOT INTRACTABLE, WITHOUT STATUS EPILEPTICUS (HCC): ICD-10-CM

## 2024-01-15 DIAGNOSIS — F41.9 ANXIETY: ICD-10-CM

## 2024-01-15 DIAGNOSIS — R56.9 SEIZURE-LIKE ACTIVITY (HCC): Primary | ICD-10-CM

## 2024-01-15 DIAGNOSIS — G89.29 OTHER CHRONIC PAIN: ICD-10-CM

## 2024-01-15 LAB
ALBUMIN SERPL-MCNC: 4 G/DL (ref 3.5–5.2)
ALP SERPL-CCNC: 199 U/L (ref 35–104)
ALT SERPL-CCNC: 36 U/L (ref 0–32)
ANION GAP SERPL CALCULATED.3IONS-SCNC: 10 MMOL/L (ref 7–16)
AST SERPL-CCNC: 45 U/L (ref 0–31)
BASOPHILS # BLD: 0.07 K/UL (ref 0–0.2)
BASOPHILS NFR BLD: 1 % (ref 0–2)
BILIRUB SERPL-MCNC: 0.4 MG/DL (ref 0–1.2)
BILIRUB UR QL STRIP: NEGATIVE
BUN SERPL-MCNC: 18 MG/DL (ref 6–23)
CALCIUM SERPL-MCNC: 9.2 MG/DL (ref 8.6–10.2)
CHLORIDE SERPL-SCNC: 104 MMOL/L (ref 98–107)
CLARITY UR: CLEAR
CO2 SERPL-SCNC: 27 MMOL/L (ref 22–29)
COLOR UR: YELLOW
CREAT SERPL-MCNC: 0.8 MG/DL (ref 0.5–1)
EOSINOPHIL # BLD: 0.48 K/UL (ref 0.05–0.5)
EOSINOPHILS RELATIVE PERCENT: 7 % (ref 0–6)
EPI CELLS #/AREA URNS HPF: NORMAL /HPF
ERYTHROCYTE [DISTWIDTH] IN BLOOD BY AUTOMATED COUNT: 13.1 % (ref 11.5–15)
GFR SERPL CREATININE-BSD FRML MDRD: >60 ML/MIN/1.73M2
GLUCOSE SERPL-MCNC: 129 MG/DL (ref 74–99)
GLUCOSE UR STRIP-MCNC: NEGATIVE MG/DL
HCT VFR BLD AUTO: 45.5 % (ref 34–48)
HGB BLD-MCNC: 15 G/DL (ref 11.5–15.5)
HGB UR QL STRIP.AUTO: NEGATIVE
IMM GRANULOCYTES # BLD AUTO: <0.03 K/UL (ref 0–0.58)
IMM GRANULOCYTES NFR BLD: 0 % (ref 0–5)
KETONES UR STRIP-MCNC: NEGATIVE MG/DL
LACTATE BLDV-SCNC: 1.7 MMOL/L (ref 0.5–2.2)
LEUKOCYTE ESTERASE UR QL STRIP: NEGATIVE
LYMPHOCYTES NFR BLD: 2.45 K/UL (ref 1.5–4)
LYMPHOCYTES RELATIVE PERCENT: 34 % (ref 20–42)
MCH RBC QN AUTO: 30.2 PG (ref 26–35)
MCHC RBC AUTO-ENTMCNC: 33 G/DL (ref 32–34.5)
MCV RBC AUTO: 91.5 FL (ref 80–99.9)
MONOCYTES NFR BLD: 0.87 K/UL (ref 0.1–0.95)
MONOCYTES NFR BLD: 12 % (ref 2–12)
NEUTROPHILS NFR BLD: 47 % (ref 43–80)
NEUTS SEG NFR BLD: 3.42 K/UL (ref 1.8–7.3)
NITRITE UR QL STRIP: NEGATIVE
PH UR STRIP: 6 [PH] (ref 5–9)
PLATELET # BLD AUTO: 193 K/UL (ref 130–450)
PMV BLD AUTO: 10 FL (ref 7–12)
POTASSIUM SERPL-SCNC: 4.8 MMOL/L (ref 3.5–5)
PROT SERPL-MCNC: 7.4 G/DL (ref 6.4–8.3)
PROT UR STRIP-MCNC: NEGATIVE MG/DL
RBC # BLD AUTO: 4.97 M/UL (ref 3.5–5.5)
RBC #/AREA URNS HPF: NORMAL /HPF
SODIUM SERPL-SCNC: 141 MMOL/L (ref 132–146)
SP GR UR STRIP: 1.02 (ref 1–1.03)
TROPONIN I SERPL HS-MCNC: 16 NG/L (ref 0–9)
TROPONIN I SERPL HS-MCNC: 19 NG/L (ref 0–9)
UROBILINOGEN UR STRIP-ACNC: 0.2 EU/DL (ref 0–1)
WBC #/AREA URNS HPF: NORMAL /HPF
WBC OTHER # BLD: 7.3 K/UL (ref 4.5–11.5)

## 2024-01-15 PROCEDURE — 80053 COMPREHEN METABOLIC PANEL: CPT

## 2024-01-15 PROCEDURE — 93005 ELECTROCARDIOGRAM TRACING: CPT

## 2024-01-15 PROCEDURE — 96365 THER/PROPH/DIAG IV INF INIT: CPT

## 2024-01-15 PROCEDURE — 36415 COLL VENOUS BLD VENIPUNCTURE: CPT

## 2024-01-15 PROCEDURE — 84484 ASSAY OF TROPONIN QUANT: CPT

## 2024-01-15 PROCEDURE — 81001 URINALYSIS AUTO W/SCOPE: CPT

## 2024-01-15 PROCEDURE — 6360000002 HC RX W HCPCS: Performed by: STUDENT IN AN ORGANIZED HEALTH CARE EDUCATION/TRAINING PROGRAM

## 2024-01-15 PROCEDURE — 70450 CT HEAD/BRAIN W/O DYE: CPT

## 2024-01-15 PROCEDURE — 2580000003 HC RX 258

## 2024-01-15 PROCEDURE — 83605 ASSAY OF LACTIC ACID: CPT

## 2024-01-15 PROCEDURE — 2060000000 HC ICU INTERMEDIATE R&B

## 2024-01-15 PROCEDURE — 85025 COMPLETE CBC W/AUTO DIFF WBC: CPT

## 2024-01-15 PROCEDURE — 99285 EMERGENCY DEPT VISIT HI MDM: CPT

## 2024-01-15 PROCEDURE — 6370000000 HC RX 637 (ALT 250 FOR IP)

## 2024-01-15 RX ORDER — FUROSEMIDE 40 MG/1
40 TABLET ORAL DAILY
Status: DISCONTINUED | OUTPATIENT
Start: 2024-01-16 | End: 2024-01-18 | Stop reason: HOSPADM

## 2024-01-15 RX ORDER — POTASSIUM CHLORIDE 7.45 MG/ML
10 INJECTION INTRAVENOUS PRN
Status: DISCONTINUED | OUTPATIENT
Start: 2024-01-15 | End: 2024-01-18 | Stop reason: HOSPADM

## 2024-01-15 RX ORDER — PREGABALIN 25 MG/1
25 CAPSULE ORAL NIGHTLY
Status: DISCONTINUED | OUTPATIENT
Start: 2024-01-15 | End: 2024-01-18 | Stop reason: HOSPADM

## 2024-01-15 RX ORDER — ONDANSETRON 2 MG/ML
4 INJECTION INTRAMUSCULAR; INTRAVENOUS EVERY 6 HOURS PRN
Status: DISCONTINUED | OUTPATIENT
Start: 2024-01-15 | End: 2024-01-18 | Stop reason: HOSPADM

## 2024-01-15 RX ORDER — POTASSIUM CHLORIDE 20 MEQ/1
40 TABLET, EXTENDED RELEASE ORAL PRN
Status: DISCONTINUED | OUTPATIENT
Start: 2024-01-15 | End: 2024-01-18 | Stop reason: HOSPADM

## 2024-01-15 RX ORDER — LOSARTAN POTASSIUM 25 MG/1
25 TABLET ORAL DAILY
Status: DISCONTINUED | OUTPATIENT
Start: 2024-01-16 | End: 2024-01-18 | Stop reason: HOSPADM

## 2024-01-15 RX ORDER — SODIUM CHLORIDE 9 MG/ML
INJECTION, SOLUTION INTRAVENOUS CONTINUOUS
Status: DISCONTINUED | OUTPATIENT
Start: 2024-01-15 | End: 2024-01-18 | Stop reason: HOSPADM

## 2024-01-15 RX ORDER — OXYCODONE HYDROCHLORIDE 15 MG/1
15 TABLET ORAL EVERY 6 HOURS PRN
Status: DISCONTINUED | OUTPATIENT
Start: 2024-01-15 | End: 2024-01-18 | Stop reason: HOSPADM

## 2024-01-15 RX ORDER — ACETAMINOPHEN 325 MG/1
650 TABLET ORAL EVERY 6 HOURS PRN
Status: DISCONTINUED | OUTPATIENT
Start: 2024-01-15 | End: 2024-01-18 | Stop reason: HOSPADM

## 2024-01-15 RX ORDER — POLYETHYLENE GLYCOL 3350 17 G/17G
17 POWDER, FOR SOLUTION ORAL DAILY PRN
Status: DISCONTINUED | OUTPATIENT
Start: 2024-01-15 | End: 2024-01-18 | Stop reason: HOSPADM

## 2024-01-15 RX ORDER — ACETAMINOPHEN 650 MG/1
650 SUPPOSITORY RECTAL EVERY 6 HOURS PRN
Status: DISCONTINUED | OUTPATIENT
Start: 2024-01-15 | End: 2024-01-18 | Stop reason: HOSPADM

## 2024-01-15 RX ORDER — DIAZEPAM 5 MG/1
10 TABLET ORAL EVERY 8 HOURS PRN
Status: DISCONTINUED | OUTPATIENT
Start: 2024-01-15 | End: 2024-01-18 | Stop reason: HOSPADM

## 2024-01-15 RX ORDER — PROPRANOLOL HYDROCHLORIDE 20 MG/1
20 TABLET ORAL 3 TIMES DAILY
Status: DISCONTINUED | OUTPATIENT
Start: 2024-01-15 | End: 2024-01-18 | Stop reason: HOSPADM

## 2024-01-15 RX ORDER — LEVOTHYROXINE SODIUM 0.03 MG/1
25 TABLET ORAL DAILY
Status: DISCONTINUED | OUTPATIENT
Start: 2024-01-16 | End: 2024-01-18 | Stop reason: HOSPADM

## 2024-01-15 RX ORDER — ATORVASTATIN CALCIUM 10 MG/1
10 TABLET, FILM COATED ORAL NIGHTLY
Status: DISCONTINUED | OUTPATIENT
Start: 2024-01-15 | End: 2024-01-18 | Stop reason: HOSPADM

## 2024-01-15 RX ORDER — SODIUM CHLORIDE 0.9 % (FLUSH) 0.9 %
5-40 SYRINGE (ML) INJECTION EVERY 12 HOURS SCHEDULED
Status: DISCONTINUED | OUTPATIENT
Start: 2024-01-15 | End: 2024-01-18 | Stop reason: HOSPADM

## 2024-01-15 RX ORDER — SODIUM CHLORIDE 9 MG/ML
INJECTION, SOLUTION INTRAVENOUS PRN
Status: DISCONTINUED | OUTPATIENT
Start: 2024-01-15 | End: 2024-01-18 | Stop reason: HOSPADM

## 2024-01-15 RX ORDER — POTASSIUM CHLORIDE 20 MEQ/1
20 TABLET, EXTENDED RELEASE ORAL 2 TIMES DAILY
Status: DISCONTINUED | OUTPATIENT
Start: 2024-01-15 | End: 2024-01-18 | Stop reason: HOSPADM

## 2024-01-15 RX ORDER — MAGNESIUM SULFATE IN WATER 40 MG/ML
2000 INJECTION, SOLUTION INTRAVENOUS PRN
Status: DISCONTINUED | OUTPATIENT
Start: 2024-01-15 | End: 2024-01-18 | Stop reason: HOSPADM

## 2024-01-15 RX ORDER — ONDANSETRON 4 MG/1
4 TABLET, ORALLY DISINTEGRATING ORAL EVERY 8 HOURS PRN
Status: DISCONTINUED | OUTPATIENT
Start: 2024-01-15 | End: 2024-01-18 | Stop reason: HOSPADM

## 2024-01-15 RX ORDER — PANTOPRAZOLE SODIUM 40 MG/1
40 TABLET, DELAYED RELEASE ORAL
Status: DISCONTINUED | OUTPATIENT
Start: 2024-01-16 | End: 2024-01-18 | Stop reason: HOSPADM

## 2024-01-15 RX ORDER — SODIUM CHLORIDE 0.9 % (FLUSH) 0.9 %
10 SYRINGE (ML) INJECTION PRN
Status: DISCONTINUED | OUTPATIENT
Start: 2024-01-15 | End: 2024-01-18 | Stop reason: HOSPADM

## 2024-01-15 RX ORDER — DOXEPIN HYDROCHLORIDE 50 MG/1
50 CAPSULE ORAL NIGHTLY
Status: DISCONTINUED | OUTPATIENT
Start: 2024-01-15 | End: 2024-01-18 | Stop reason: HOSPADM

## 2024-01-15 RX ORDER — PHENYTOIN SODIUM 100 MG/1
100 CAPSULE, EXTENDED RELEASE ORAL 2 TIMES DAILY
Status: DISCONTINUED | OUTPATIENT
Start: 2024-01-15 | End: 2024-01-18 | Stop reason: HOSPADM

## 2024-01-15 RX ORDER — MAGNESIUM SULFATE IN WATER 40 MG/ML
2000 INJECTION, SOLUTION INTRAVENOUS ONCE
Status: COMPLETED | OUTPATIENT
Start: 2024-01-15 | End: 2024-01-15

## 2024-01-15 RX ADMIN — PROPRANOLOL HYDROCHLORIDE 20 MG: 20 TABLET ORAL at 23:30

## 2024-01-15 RX ADMIN — SODIUM CHLORIDE: 9 INJECTION, SOLUTION INTRAVENOUS at 22:45

## 2024-01-15 RX ADMIN — PHENYTOIN SODIUM 100 MG: 100 CAPSULE ORAL at 23:30

## 2024-01-15 RX ADMIN — POTASSIUM CHLORIDE 20 MEQ: 1500 TABLET, EXTENDED RELEASE ORAL at 22:44

## 2024-01-15 RX ADMIN — MAGNESIUM SULFATE HEPTAHYDRATE 2000 MG: 40 INJECTION, SOLUTION INTRAVENOUS at 13:26

## 2024-01-15 RX ADMIN — ATORVASTATIN CALCIUM 10 MG: 10 TABLET, FILM COATED ORAL at 22:44

## 2024-01-15 RX ADMIN — OXYCODONE 15 MG: 15 TABLET ORAL at 23:30

## 2024-01-15 RX ADMIN — APIXABAN 5 MG: 5 TABLET, FILM COATED ORAL at 22:44

## 2024-01-15 RX ADMIN — SODIUM CHLORIDE, PRESERVATIVE FREE 10 ML: 5 INJECTION INTRAVENOUS at 22:44

## 2024-01-15 RX ADMIN — DOXEPIN HYDROCHLORIDE 50 MG: 50 CAPSULE ORAL at 23:30

## 2024-01-15 ASSESSMENT — LIFESTYLE VARIABLES
HOW OFTEN DO YOU HAVE A DRINK CONTAINING ALCOHOL: NEVER
HOW MANY STANDARD DRINKS CONTAINING ALCOHOL DO YOU HAVE ON A TYPICAL DAY: PATIENT DOES NOT DRINK
HOW OFTEN DO YOU HAVE A DRINK CONTAINING ALCOHOL: NEVER
HOW MANY STANDARD DRINKS CONTAINING ALCOHOL DO YOU HAVE ON A TYPICAL DAY: PATIENT DOES NOT DRINK

## 2024-01-15 ASSESSMENT — PAIN SCALES - GENERAL: PAINLEVEL_OUTOF10: 7

## 2024-01-15 ASSESSMENT — PAIN - FUNCTIONAL ASSESSMENT
PAIN_FUNCTIONAL_ASSESSMENT: NONE - DENIES PAIN

## 2024-01-15 ASSESSMENT — PAIN DESCRIPTION - DESCRIPTORS: DESCRIPTORS: SHARP

## 2024-01-15 ASSESSMENT — PAIN DESCRIPTION - ORIENTATION: ORIENTATION: LOWER

## 2024-01-15 ASSESSMENT — PAIN DESCRIPTION - LOCATION: LOCATION: BACK;HIP

## 2024-01-15 NOTE — ED PROVIDER NOTES
6/24/2023   7:02 PM Possible medication side effects, risk of tolerance/dependence & alternative treatments discussed.          ---------------------------------------------------PHYSICAL EXAM--------------------------------------    Constitutional/General: Alert and oriented x3, well appearing, non toxic in NAD  Head: Normocephalic and atraumatic  Eyes: EOMI, PERRL  Mouth: Oropharynx clear, handling secretions, no trismus  Neck: Supple, full ROM, no nuchal rigidity, no meningeal signs  Pulmonary: Lungs clear to auscultation bilaterally, no wheezes, rales, or rhonchi. Not in respiratory distress  Cardiovascular:  Regular rate. Regular rhythm.  Abdomen: Soft.  Non tender. Non distended.   No rebound, guarding, or rigidity. No pulsatile masses appreciated.  Musculoskeletal: Moves all extremities x 4.  Neurovascularly intact. Warm and well perfused, no clubbing, cyanosis, or edema. Compartments are soft and compressible.  Skin: warm and dry. No rashes.   Neurologic: GCS 15, no gross focal neurologic deficits. Facial symmetry. Speech clear. Cranial nerves intact. No focal motor or sensory deficits. No ataxia with finger-to-nose or heel-to-shin. No drift in upper or lower extremities. No nystagmus.   Psych: Normal Affect  NIH Stroke Scale/Score at time of initial evaluation:  1A: Level of Consciousness 0 - alert; keenly responsive   1B: Ask Month and Age 0 - answers both questions correctly   1C: Tell Patient To Open and Close Eyes, then Hand  Squeeze 0 - performs both tasks correctly   2: Test Horizontal Extraocular Movements 0 - normal   3: Test Visual Fields 0 - no visual loss   4: Test Facial Palsy 0 - normal symmetric movement   5A: Test Left Arm Motor Drift 0 - no drift, limb holds 90 (or 45) degrees for full 10 seconds   5B: Test Right Arm Motor Drift 0 - no drift, limb holds 90 (or 45) degrees for full 10 seconds   6A: Test Left Leg Motor Drift 0 - no drift; leg holds 30 degree position for full 5 seconds

## 2024-01-16 LAB
ANION GAP SERPL CALCULATED.3IONS-SCNC: 9 MMOL/L (ref 7–16)
BASOPHILS # BLD: 0.06 K/UL (ref 0–0.2)
BASOPHILS NFR BLD: 1 % (ref 0–2)
BUN SERPL-MCNC: 13 MG/DL (ref 6–23)
CALCIUM SERPL-MCNC: 9 MG/DL (ref 8.6–10.2)
CHLORIDE SERPL-SCNC: 106 MMOL/L (ref 98–107)
CO2 SERPL-SCNC: 26 MMOL/L (ref 22–29)
CREAT SERPL-MCNC: 0.6 MG/DL (ref 0.5–1)
EOSINOPHIL # BLD: 0.37 K/UL (ref 0.05–0.5)
EOSINOPHILS RELATIVE PERCENT: 6 % (ref 0–6)
ERYTHROCYTE [DISTWIDTH] IN BLOOD BY AUTOMATED COUNT: 13 % (ref 11.5–15)
GFR SERPL CREATININE-BSD FRML MDRD: >60 ML/MIN/1.73M2
GLUCOSE SERPL-MCNC: 103 MG/DL (ref 74–99)
HCT VFR BLD AUTO: 41.9 % (ref 34–48)
HGB BLD-MCNC: 13.6 G/DL (ref 11.5–15.5)
IMM GRANULOCYTES # BLD AUTO: <0.03 K/UL (ref 0–0.58)
IMM GRANULOCYTES NFR BLD: 0 % (ref 0–5)
LYMPHOCYTES NFR BLD: 2.28 K/UL (ref 1.5–4)
LYMPHOCYTES RELATIVE PERCENT: 40 % (ref 20–42)
MCH RBC QN AUTO: 30.3 PG (ref 26–35)
MCHC RBC AUTO-ENTMCNC: 32.5 G/DL (ref 32–34.5)
MCV RBC AUTO: 93.3 FL (ref 80–99.9)
MONOCYTES NFR BLD: 0.77 K/UL (ref 0.1–0.95)
MONOCYTES NFR BLD: 13 % (ref 2–12)
NEUTROPHILS NFR BLD: 40 % (ref 43–80)
NEUTS SEG NFR BLD: 2.28 K/UL (ref 1.8–7.3)
PLATELET # BLD AUTO: 156 K/UL (ref 130–450)
PMV BLD AUTO: 10.1 FL (ref 7–12)
POTASSIUM SERPL-SCNC: 4.2 MMOL/L (ref 3.5–5)
RBC # BLD AUTO: 4.49 M/UL (ref 3.5–5.5)
SODIUM SERPL-SCNC: 141 MMOL/L (ref 132–146)
WBC OTHER # BLD: 5.8 K/UL (ref 4.5–11.5)

## 2024-01-16 PROCEDURE — 36415 COLL VENOUS BLD VENIPUNCTURE: CPT

## 2024-01-16 PROCEDURE — 85025 COMPLETE CBC W/AUTO DIFF WBC: CPT

## 2024-01-16 PROCEDURE — 2060000000 HC ICU INTERMEDIATE R&B

## 2024-01-16 PROCEDURE — 2580000003 HC RX 258

## 2024-01-16 PROCEDURE — 97161 PT EVAL LOW COMPLEX 20 MIN: CPT

## 2024-01-16 PROCEDURE — 6370000000 HC RX 637 (ALT 250 FOR IP)

## 2024-01-16 PROCEDURE — 97165 OT EVAL LOW COMPLEX 30 MIN: CPT

## 2024-01-16 PROCEDURE — 80048 BASIC METABOLIC PNL TOTAL CA: CPT

## 2024-01-16 RX ADMIN — ATORVASTATIN CALCIUM 10 MG: 10 TABLET, FILM COATED ORAL at 19:55

## 2024-01-16 RX ADMIN — LOSARTAN POTASSIUM 25 MG: 25 TABLET, FILM COATED ORAL at 08:10

## 2024-01-16 RX ADMIN — LEVOTHYROXINE SODIUM 25 MCG: 25 TABLET ORAL at 06:00

## 2024-01-16 RX ADMIN — FUROSEMIDE 40 MG: 40 TABLET ORAL at 08:11

## 2024-01-16 RX ADMIN — SODIUM CHLORIDE: 9 INJECTION, SOLUTION INTRAVENOUS at 12:15

## 2024-01-16 RX ADMIN — PHENYTOIN SODIUM 100 MG: 100 CAPSULE ORAL at 19:55

## 2024-01-16 RX ADMIN — PROPRANOLOL HYDROCHLORIDE 20 MG: 20 TABLET ORAL at 08:10

## 2024-01-16 RX ADMIN — OXYCODONE 15 MG: 15 TABLET ORAL at 19:55

## 2024-01-16 RX ADMIN — PHENYTOIN SODIUM 100 MG: 100 CAPSULE ORAL at 08:10

## 2024-01-16 RX ADMIN — APIXABAN 5 MG: 5 TABLET, FILM COATED ORAL at 19:55

## 2024-01-16 RX ADMIN — PANTOPRAZOLE SODIUM 40 MG: 40 TABLET, DELAYED RELEASE ORAL at 06:00

## 2024-01-16 RX ADMIN — APIXABAN 5 MG: 5 TABLET, FILM COATED ORAL at 08:10

## 2024-01-16 RX ADMIN — PREGABALIN 25 MG: 25 CAPSULE ORAL at 19:55

## 2024-01-16 RX ADMIN — POTASSIUM CHLORIDE 20 MEQ: 1500 TABLET, EXTENDED RELEASE ORAL at 08:11

## 2024-01-16 RX ADMIN — PROPRANOLOL HYDROCHLORIDE 20 MG: 20 TABLET ORAL at 14:20

## 2024-01-16 RX ADMIN — POTASSIUM CHLORIDE 20 MEQ: 1500 TABLET, EXTENDED RELEASE ORAL at 19:55

## 2024-01-16 RX ADMIN — SODIUM CHLORIDE, PRESERVATIVE FREE 10 ML: 5 INJECTION INTRAVENOUS at 19:57

## 2024-01-16 RX ADMIN — PROPRANOLOL HYDROCHLORIDE 20 MG: 20 TABLET ORAL at 19:55

## 2024-01-16 ASSESSMENT — PAIN SCALES - GENERAL
PAINLEVEL_OUTOF10: 9
PAINLEVEL_OUTOF10: 5

## 2024-01-16 ASSESSMENT — PAIN DESCRIPTION - LOCATION
LOCATION: BACK
LOCATION: BACK

## 2024-01-16 NOTE — ED NOTES
ED to Inpatient Handoff Report    Notified nursing staff on 4S that electronic handoff available and patient ready for transport to room 0436.    Safety Risks: Risk of falls    Patient in Restraints: no    Constant Observer or Patient : no    Telemetry Monitoring Ordered :Yes           Order to transfer to unit without monitor:NO    Last MEWS: 1 Time completed: 2102    Deterioration Index Score:   Predictive Model Details          29 (Normal)  Factor Value    Calculated 1/15/2024 21:03 44% Age 83 years old    Deterioration Index Model 32% Neurological exam X     14% Potassium 4.8 mmol/L     5% Respiratory rate 15     3% Systolic 123     2% Sodium 141 mmol/L     1% Hematocrit 45.5 %     0% Pulse 80     0% Pulse oximetry 96 %     0% WBC count 7.3 k/uL     0% Temperature 98.2 °F (36.8 °C)        Vitals:    01/15/24 1326 01/15/24 1720 01/15/24 1846 01/15/24 2102   BP: 127/89 105/84 (!) 147/66 123/81   Pulse: 79 78 94 80   Resp: 15 15 17 15   Temp:  98.2 °F (36.8 °C) 98.2 °F (36.8 °C) 98.2 °F (36.8 °C)   TempSrc:  Oral Oral Oral   SpO2: 96% 98% 98% 96%   Weight:       Height:             Opportunity for questions and clarification was provided.

## 2024-01-16 NOTE — ACP (ADVANCE CARE PLANNING)
Advance Care Planning   Healthcare Decision Maker:    Primary Decision Maker: Santhosh Arita (Poa) - Child - [phone]    Click here to complete Healthcare Decision Makers including selection of the Healthcare Decision Maker Relationship (ie \"Primary\").

## 2024-01-16 NOTE — CARE COORDINATION
Social Work/Discharge Planning:  Met with patient and completed initial assessment.  Explained Social Work role and discussed transition of care/discharge planning.  Patient is a readmit, discharged 1/14.  Patient lives alone in a first floor apartment.  PTA she was independent with no adaptive device.  She has a cane and wheeled walker.  She has a [facility] health care history with [facility].  She has a snf history at [facility].  Reviewed therapy score indicating need for rehab and provided patient with snf choice list to review.  Patient states she prefers [facility].  Isi with [facility] states facility has no beds available.  Informed patient of need for additional snf choices.  Will continue to follow and assist with discharge planning.  Electronically signed by GUERO Garnett on 1/16/2024 at 3:19 PM

## 2024-01-16 NOTE — PLAN OF CARE
Problem: Discharge Planning  Goal: Discharge to home or other facility with appropriate resources  Outcome: Progressing  Flowsheets (Taken 1/15/2024 2157)  Discharge to home or other facility with appropriate resources:   Identify barriers to discharge with patient and caregiver   Arrange for interpreters to assist at discharge as needed   Arrange for needed discharge resources and transportation as appropriate     Problem: Safety - Adult  Goal: Free from fall injury  Outcome: Progressing     Problem: ABCDS Injury Assessment  Goal: Absence of physical injury  Outcome: Progressing  Flowsheets (Taken 1/15/2024 2149)  Absence of Physical Injury: Implement safety measures based on patient assessment

## 2024-01-16 NOTE — CARE COORDINATION
CASE MANAGEMENT.... Choices for sars provided.   [facility]  2) [facility]  3) [facility]  [facility]  5) [facility]    SS/ to make referrals tomorrow when facilities are available.

## 2024-01-16 NOTE — ED NOTES
Spoke with nursing staff on 4S and notified them of electronic SBAR being done. Nurse to nurse report given. Patient stable and ready for transport.

## 2024-01-16 NOTE — H&P
[facility]  History and Physical      CHIEF COMPLAINT:    Chief Complaint   Patient presents with    Seizures     Patient had witnessed seizure while at home. Patient denies LOC. Discharged from hospital yesterday with UTI.        Patient of Marvin Briggs MD presents with:  Seizure-like activity (HCC)    History of Present Illness:   Patient is an 83-year-old female with a past medical history of anxiety and depression, arthritis, asthma, CKD, hypertension, hyperlipidemia, hypothyroidism, seizures, thrombocytopenia, and vitamin D deficiency who presents to the emergency room for seizure-like activity while at home.  Patient was just recently admitted 1/11-1/14 for urinary tract infection and discharged home.  On arrival patient had a CT head which was negative for anything acute.  Labs are relatively unremarkable.  On last admission patient's therapy scores would not qualify her for rehab.  Patient is admitted to [facility] with telemetry for further workup and treatment.      REVIEW OF SYSTEMS:  Pertinent negatives are above in HPI.  10 point ROS otherwise negative.      Past Medical History:   Diagnosis Date    Anxiety and depression 7/13/2016    Arthritis     osteo    Asthma     has chronic couch    Chronic back pain     Chronic kidney disease (CKD), stage II (mild) 04/19/2012    Creatinine around 1.3 to 1.4 in April 2012 secondary to IV antibiotics following back surgery.    Chronic osteomyelitis of lumbar spine (HCC)     note on chart from Dr. Centeno dated 8/17/2017    Foot pain     after back surgery / chronic    HTN (hypertension), benign 1/10/2015    Hx of blood clots     2011 / DVT, PE    Hx of migraines     Hyperlipidemia     Hypothyroidism     Hypothyroidism 7/13/2016    Neuropathy of leg     bilateral    Other postoperative infection 08/2011    Was on Vancomycin following the surgery for the removal of lumbar cysts. Patient on antibiotics for life     Radiculopathy, lumbar

## 2024-01-17 LAB
ANION GAP SERPL CALCULATED.3IONS-SCNC: 7 MMOL/L (ref 7–16)
BASOPHILS # BLD: 0.06 K/UL (ref 0–0.2)
BASOPHILS NFR BLD: 1 % (ref 0–2)
BUN SERPL-MCNC: 11 MG/DL (ref 6–23)
CALCIUM SERPL-MCNC: 8.9 MG/DL (ref 8.6–10.2)
CHLORIDE SERPL-SCNC: 106 MMOL/L (ref 98–107)
CO2 SERPL-SCNC: 26 MMOL/L (ref 22–29)
CREAT SERPL-MCNC: 0.7 MG/DL (ref 0.5–1)
EKG ATRIAL RATE: 79 BPM
EKG P AXIS: 24 DEGREES
EKG P-R INTERVAL: 184 MS
EKG Q-T INTERVAL: 378 MS
EKG QRS DURATION: 80 MS
EKG QTC CALCULATION (BAZETT): 433 MS
EKG R AXIS: 5 DEGREES
EKG T AXIS: 36 DEGREES
EKG VENTRICULAR RATE: 79 BPM
EOSINOPHIL # BLD: 0.32 K/UL (ref 0.05–0.5)
EOSINOPHILS RELATIVE PERCENT: 6 % (ref 0–6)
ERYTHROCYTE [DISTWIDTH] IN BLOOD BY AUTOMATED COUNT: 12.9 % (ref 11.5–15)
GFR SERPL CREATININE-BSD FRML MDRD: >60 ML/MIN/1.73M2
GLUCOSE SERPL-MCNC: 98 MG/DL (ref 74–99)
HCT VFR BLD AUTO: 40.7 % (ref 34–48)
HGB BLD-MCNC: 13 G/DL (ref 11.5–15.5)
IMM GRANULOCYTES # BLD AUTO: <0.03 K/UL (ref 0–0.58)
IMM GRANULOCYTES NFR BLD: 0 % (ref 0–5)
LYMPHOCYTES NFR BLD: 2.61 K/UL (ref 1.5–4)
LYMPHOCYTES RELATIVE PERCENT: 45 % (ref 20–42)
MCH RBC QN AUTO: 29.9 PG (ref 26–35)
MCHC RBC AUTO-ENTMCNC: 31.9 G/DL (ref 32–34.5)
MCV RBC AUTO: 93.6 FL (ref 80–99.9)
MONOCYTES NFR BLD: 0.69 K/UL (ref 0.1–0.95)
MONOCYTES NFR BLD: 12 % (ref 2–12)
NEUTROPHILS NFR BLD: 37 % (ref 43–80)
NEUTS SEG NFR BLD: 2.18 K/UL (ref 1.8–7.3)
PLATELET # BLD AUTO: 161 K/UL (ref 130–450)
PMV BLD AUTO: 10.1 FL (ref 7–12)
POTASSIUM SERPL-SCNC: 3.9 MMOL/L (ref 3.5–5)
RBC # BLD AUTO: 4.35 M/UL (ref 3.5–5.5)
SODIUM SERPL-SCNC: 139 MMOL/L (ref 132–146)
WBC OTHER # BLD: 5.9 K/UL (ref 4.5–11.5)

## 2024-01-17 PROCEDURE — 2060000000 HC ICU INTERMEDIATE R&B

## 2024-01-17 PROCEDURE — 97530 THERAPEUTIC ACTIVITIES: CPT

## 2024-01-17 PROCEDURE — 36415 COLL VENOUS BLD VENIPUNCTURE: CPT

## 2024-01-17 PROCEDURE — 80048 BASIC METABOLIC PNL TOTAL CA: CPT

## 2024-01-17 PROCEDURE — 85025 COMPLETE CBC W/AUTO DIFF WBC: CPT

## 2024-01-17 PROCEDURE — 93010 ELECTROCARDIOGRAM REPORT: CPT | Performed by: INTERNAL MEDICINE

## 2024-01-17 PROCEDURE — 2580000003 HC RX 258

## 2024-01-17 PROCEDURE — 6370000000 HC RX 637 (ALT 250 FOR IP)

## 2024-01-17 RX ADMIN — PHENYTOIN SODIUM 100 MG: 100 CAPSULE ORAL at 09:35

## 2024-01-17 RX ADMIN — PROPRANOLOL HYDROCHLORIDE 20 MG: 20 TABLET ORAL at 21:16

## 2024-01-17 RX ADMIN — POTASSIUM CHLORIDE 20 MEQ: 1500 TABLET, EXTENDED RELEASE ORAL at 21:17

## 2024-01-17 RX ADMIN — SODIUM CHLORIDE, PRESERVATIVE FREE 10 ML: 5 INJECTION INTRAVENOUS at 09:35

## 2024-01-17 RX ADMIN — PHENYTOIN SODIUM 100 MG: 100 CAPSULE ORAL at 21:16

## 2024-01-17 RX ADMIN — PANTOPRAZOLE SODIUM 40 MG: 40 TABLET, DELAYED RELEASE ORAL at 05:57

## 2024-01-17 RX ADMIN — APIXABAN 5 MG: 5 TABLET, FILM COATED ORAL at 09:35

## 2024-01-17 RX ADMIN — PREGABALIN 25 MG: 25 CAPSULE ORAL at 21:17

## 2024-01-17 RX ADMIN — LEVOTHYROXINE SODIUM 25 MCG: 25 TABLET ORAL at 05:57

## 2024-01-17 RX ADMIN — ATORVASTATIN CALCIUM 10 MG: 10 TABLET, FILM COATED ORAL at 21:17

## 2024-01-17 RX ADMIN — SODIUM CHLORIDE, PRESERVATIVE FREE 10 ML: 5 INJECTION INTRAVENOUS at 21:18

## 2024-01-17 RX ADMIN — POTASSIUM CHLORIDE 20 MEQ: 1500 TABLET, EXTENDED RELEASE ORAL at 09:35

## 2024-01-17 RX ADMIN — LOSARTAN POTASSIUM 25 MG: 25 TABLET, FILM COATED ORAL at 09:35

## 2024-01-17 RX ADMIN — PROPRANOLOL HYDROCHLORIDE 20 MG: 20 TABLET ORAL at 13:48

## 2024-01-17 RX ADMIN — PROPRANOLOL HYDROCHLORIDE 20 MG: 20 TABLET ORAL at 09:35

## 2024-01-17 RX ADMIN — OXYCODONE 15 MG: 15 TABLET ORAL at 09:38

## 2024-01-17 RX ADMIN — SODIUM CHLORIDE: 9 INJECTION, SOLUTION INTRAVENOUS at 02:05

## 2024-01-17 RX ADMIN — DIAZEPAM 10 MG: 5 TABLET ORAL at 09:38

## 2024-01-17 RX ADMIN — DOXEPIN HYDROCHLORIDE 50 MG: 50 CAPSULE ORAL at 21:17

## 2024-01-17 RX ADMIN — FUROSEMIDE 40 MG: 40 TABLET ORAL at 09:35

## 2024-01-17 RX ADMIN — APIXABAN 5 MG: 5 TABLET, FILM COATED ORAL at 21:17

## 2024-01-17 ASSESSMENT — PAIN DESCRIPTION - LOCATION: LOCATION: BACK

## 2024-01-17 ASSESSMENT — PAIN DESCRIPTION - DESCRIPTORS: DESCRIPTORS: ACHING;DISCOMFORT

## 2024-01-17 ASSESSMENT — PAIN DESCRIPTION - ORIENTATION: ORIENTATION: RIGHT;LEFT

## 2024-01-17 NOTE — DISCHARGE INSTR - COC
Continuity of Care Form    Patient Name: Lucinda Guadarrama   :  1940  MRN:  [id number]    Admit date:  1/15/2024  Discharge date:  24    Code Status Order: Full Code   Advance Directives:     Admitting Physician:  Yumiko Bonds MD  PCP: Marvin Briggs MD    Discharging Nurse: ROBERT  Discharging Hospital Unit/Room#: 0436/0436-A  Discharging Unit Phone Number: [phone]    Emergency Contact:   Extended Emergency Contact Information  Primary Emergency Contact: Santhosh Arita (Poa)  Address: [address] [place] [address] of [place]  Home Phone: [phone]  Mobile Phone: [phone]  Relation: Child  Secondary Emergency Contact: Santhosh Villalta  Home Phone: [phone]  Relation: Other    Past Surgical History:  Past Surgical History:   Procedure Laterality Date    BACK SURGERY      multiple    CERVICAL FUSION  ?     SECTION      CHOLECYSTECTOMY      lap    COLONOSCOPY      EYE SURGERY Bilateral 1995    cataract extraction    JOINT REPLACEMENT  2010    left    KNEE ARTHROPLASTY Right 2017    Total right knee arthroplasty    LUMBAR LAMINECTOMY  1985    [place] COLONOSCOPY FLX DX W/COLLJ SPEC WHEN PFRMD N/A 6/15/2018    COLONOSCOPY DIAGNOSTIC performed by Brandan Dias MD at [facility] ENDOSCOPY    [place] EGD TRANSORAL BIOPSY SINGLE/MULTIPLE N/A 6/15/2018    EGD ESOPHAGOGASTRODUODENOSCOPY performed by Brandan Dias MD at [facility] ENDOSCOPY    SPINE SURGERY   &     LLAM    SPINE SURGERY      Neck    SPINE SURGERY      synovial cyst    SPINE SURGERY  2012    rebuilt talibone    TONSILLECTOMY         Immunization History:   Immunization History   Administered Date(s) Administered    COVID-19, PFIZER PURPLE top, DILUTE for use, (age 12 y+), 30mcg/0.3mL 2021, 2021    Influenza Virus Vaccine 10/04/2014, 10/30/2016    Influenza, AFLURIA (age 3 yrs+), FLUZONE, (age 6 mo+), MDV, 0.5mL 10/30/2016    TDaP, ADACEL (age 10y-64y), BOOSTRIX (age 10y+), IM, 0.5mL

## 2024-01-17 NOTE — PLAN OF CARE
Problem: Discharge Planning  Goal: Discharge to home or other facility with appropriate resources  1/17/2024 1043 by Lucina Velarde RN  Outcome: Progressing     Problem: Safety - Adult  Goal: Free from fall injury  1/17/2024 1043 by Lucina Velarde RN  Outcome: Progressing     Problem: ABCDS Injury Assessment  Goal: Absence of physical injury  1/17/2024 1043 by Lucina Velarde RN  Outcome: Progressing     Problem: Pain  Goal: Verbalizes/displays adequate comfort level or baseline comfort level  1/17/2024 1043 by Lucina Velarde RN  Outcome: Progressing

## 2024-01-17 NOTE — PLAN OF CARE
Problem: Discharge Planning  Goal: Discharge to home or other facility with appropriate resources  1/16/2024 2143 by Magalys Ferris RN  Outcome: Progressing  1/16/2024 0842 by Jennie Baum RN  Outcome: Progressing     Problem: Safety - Adult  Goal: Free from fall injury  1/16/2024 2143 by Magalys Ferris RN  Outcome: Progressing  1/16/2024 0842 by Jennie Baum RN  Outcome: Progressing     Problem: ABCDS Injury Assessment  Goal: Absence of physical injury  Outcome: Progressing     Problem: Pain  Goal: Verbalizes/displays adequate comfort level or baseline comfort level  Outcome: Progressing

## 2024-01-17 NOTE — CARE COORDINATION
Social Work/Discharge Planning:  Called Ann with [facility], Lorie with [facility], Patricia with [facility] and Francesca with Mary at [facility] and left a message in regards to bed availability.  Will continue to follow and wait for return call.  Electronically signed by GUERO Garnett on 1/17/2024 at 7:53 AM    Addendum:  Referral made to liaison Lorie with [facility] and she will review patient information.  Will continue to follow.  Electronically signed by GUERO Garnett on 1/17/2024 at 8:02 AM    Addendum:  Referral made to Ann with [facility] and she will review patient information.  Referral made to Patricia with [facility] and she will review patient information.  Will continue to follow.  Electronically signed by GUERO Garnett on 1/17/2024 at 8:40 AM    Addendum:  Ann with [facility] states [facility] can accept patient.  Requested for facility to start pre-cert.  Cancelled referral to [facility] and [facility].  Updated patient.  Will continue to follow and wait for pre-cert.  Electronically signed by GUERO Garnett on 1/17/2024 at 11:41 AM

## 2024-01-18 VITALS
BODY MASS INDEX: 35.93 KG/M2 | SYSTOLIC BLOOD PRESSURE: 112 MMHG | RESPIRATION RATE: 18 BRPM | TEMPERATURE: 98 F | WEIGHT: 183 LBS | HEART RATE: 70 BPM | OXYGEN SATURATION: 96 % | DIASTOLIC BLOOD PRESSURE: 58 MMHG | HEIGHT: 60 IN

## 2024-01-18 LAB
ANION GAP SERPL CALCULATED.3IONS-SCNC: 10 MMOL/L (ref 7–16)
BASOPHILS # BLD: 0.05 K/UL (ref 0–0.2)
BASOPHILS NFR BLD: 1 % (ref 0–2)
BUN SERPL-MCNC: 13 MG/DL (ref 6–23)
CALCIUM SERPL-MCNC: 9.2 MG/DL (ref 8.6–10.2)
CHLORIDE SERPL-SCNC: 104 MMOL/L (ref 98–107)
CO2 SERPL-SCNC: 26 MMOL/L (ref 22–29)
CREAT SERPL-MCNC: 0.7 MG/DL (ref 0.5–1)
EOSINOPHIL # BLD: 0.36 K/UL (ref 0.05–0.5)
EOSINOPHILS RELATIVE PERCENT: 5 % (ref 0–6)
ERYTHROCYTE [DISTWIDTH] IN BLOOD BY AUTOMATED COUNT: 13 % (ref 11.5–15)
GFR SERPL CREATININE-BSD FRML MDRD: >60 ML/MIN/1.73M2
GLUCOSE SERPL-MCNC: 98 MG/DL (ref 74–99)
HCT VFR BLD AUTO: 40.4 % (ref 34–48)
HGB BLD-MCNC: 13.1 G/DL (ref 11.5–15.5)
IMM GRANULOCYTES # BLD AUTO: <0.03 K/UL (ref 0–0.58)
IMM GRANULOCYTES NFR BLD: 0 % (ref 0–5)
LYMPHOCYTES NFR BLD: 3 K/UL (ref 1.5–4)
LYMPHOCYTES RELATIVE PERCENT: 41 % (ref 20–42)
MCH RBC QN AUTO: 29.9 PG (ref 26–35)
MCHC RBC AUTO-ENTMCNC: 32.4 G/DL (ref 32–34.5)
MCV RBC AUTO: 92.2 FL (ref 80–99.9)
MONOCYTES NFR BLD: 0.87 K/UL (ref 0.1–0.95)
MONOCYTES NFR BLD: 12 % (ref 2–12)
NEUTROPHILS NFR BLD: 42 % (ref 43–80)
NEUTS SEG NFR BLD: 3.08 K/UL (ref 1.8–7.3)
PLATELET # BLD AUTO: 173 K/UL (ref 130–450)
PMV BLD AUTO: 10.2 FL (ref 7–12)
POTASSIUM SERPL-SCNC: 4 MMOL/L (ref 3.5–5)
RBC # BLD AUTO: 4.38 M/UL (ref 3.5–5.5)
SODIUM SERPL-SCNC: 140 MMOL/L (ref 132–146)
WBC OTHER # BLD: 7.4 K/UL (ref 4.5–11.5)

## 2024-01-18 PROCEDURE — 80048 BASIC METABOLIC PNL TOTAL CA: CPT

## 2024-01-18 PROCEDURE — 2580000003 HC RX 258

## 2024-01-18 PROCEDURE — 85025 COMPLETE CBC W/AUTO DIFF WBC: CPT

## 2024-01-18 PROCEDURE — 6370000000 HC RX 637 (ALT 250 FOR IP)

## 2024-01-18 RX ORDER — PROPRANOLOL HYDROCHLORIDE 20 MG/1
20 TABLET ORAL 3 TIMES DAILY
DISCHARGE
Start: 2024-01-18

## 2024-01-18 RX ORDER — OXYCODONE HYDROCHLORIDE 15 MG/1
15 TABLET ORAL EVERY 6 HOURS PRN
Qty: 120 TABLET | Refills: 0 | Status: SHIPPED | OUTPATIENT
Start: 2024-01-18 | End: 2024-02-17

## 2024-01-18 RX ORDER — DOXEPIN HYDROCHLORIDE 50 MG/1
CAPSULE ORAL
Qty: 30 CAPSULE | Refills: 0 | Status: SHIPPED | OUTPATIENT
Start: 2024-01-18

## 2024-01-18 RX ORDER — DIAZEPAM 10 MG/1
10 TABLET ORAL EVERY 8 HOURS PRN
Qty: 90 TABLET | Refills: 0 | Status: SHIPPED | OUTPATIENT
Start: 2024-01-18 | End: 2024-02-17

## 2024-01-18 RX ORDER — PHENYTOIN SODIUM 100 MG/1
100 CAPSULE, EXTENDED RELEASE ORAL 2 TIMES DAILY
Qty: 60 CAPSULE | Refills: 5 | DISCHARGE
Start: 2024-01-18

## 2024-01-18 RX ORDER — PREGABALIN 25 MG/1
25 CAPSULE ORAL NIGHTLY
Qty: 30 CAPSULE | Refills: 0 | Status: SHIPPED | OUTPATIENT
Start: 2024-01-18 | End: 2024-02-17

## 2024-01-18 RX ADMIN — APIXABAN 5 MG: 5 TABLET, FILM COATED ORAL at 09:16

## 2024-01-18 RX ADMIN — PANTOPRAZOLE SODIUM 40 MG: 40 TABLET, DELAYED RELEASE ORAL at 06:30

## 2024-01-18 RX ADMIN — PROPRANOLOL HYDROCHLORIDE 20 MG: 20 TABLET ORAL at 14:47

## 2024-01-18 RX ADMIN — PROPRANOLOL HYDROCHLORIDE 20 MG: 20 TABLET ORAL at 09:16

## 2024-01-18 RX ADMIN — OXYCODONE 15 MG: 15 TABLET ORAL at 14:47

## 2024-01-18 RX ADMIN — POTASSIUM CHLORIDE 20 MEQ: 1500 TABLET, EXTENDED RELEASE ORAL at 09:16

## 2024-01-18 RX ADMIN — LEVOTHYROXINE SODIUM 25 MCG: 25 TABLET ORAL at 06:30

## 2024-01-18 RX ADMIN — PHENYTOIN SODIUM 100 MG: 100 CAPSULE ORAL at 09:16

## 2024-01-18 RX ADMIN — FUROSEMIDE 40 MG: 40 TABLET ORAL at 09:16

## 2024-01-18 RX ADMIN — DIAZEPAM 10 MG: 5 TABLET ORAL at 14:47

## 2024-01-18 RX ADMIN — LOSARTAN POTASSIUM 25 MG: 25 TABLET, FILM COATED ORAL at 09:17

## 2024-01-18 RX ADMIN — SODIUM CHLORIDE, PRESERVATIVE FREE 10 ML: 5 INJECTION INTRAVENOUS at 09:17

## 2024-01-18 NOTE — PLAN OF CARE
Problem: Discharge Planning  Goal: Discharge to home or other facility with appropriate resources  1/18/2024 0954 by Lucina Velarde RN  Outcome: Progressing     Problem: Safety - Adult  Goal: Free from fall injury  1/18/2024 0954 by Lucina Velarde RN  Outcome: Progressing     Problem: ABCDS Injury Assessment  Goal: Absence of physical injury  1/18/2024 0954 by Lucina Velarde RN  Outcome: Progressing     Problem: Pain  Goal: Verbalizes/displays adequate comfort level or baseline comfort level  1/18/2024 0954 by Lucina Velarde RN  Outcome: Progressing

## 2024-01-18 NOTE — DISCHARGE SUMMARY
PROVIDED HISTORY: possible seizure like activity TECHNOLOGIST PROVIDED HISTORY: Reason for exam:->possible seizure like activity Has a \"code stroke\" or \"stroke alert\" been called?->No Decision Support Exception - unselect if not a suspected or confirmed emergency medical condition->Emergency Medical Condition (MA) FINDINGS: BRAIN/VENTRICLES: There is no acute intracranial hemorrhage, mass effect or midline shift. No abnormal extra-axial fluid collection.  The gray-white differentiation is maintained without evidence of an acute infarct. There is prominence of the ventricles and sulci due to global parenchymal volume loss. There are nonspecific areas of hypoattenuation within the periventricular and subcortical white matter, which likely represent chronic microvascular ischemic change. ORBITS: The visualized portion of the orbits demonstrate no acute abnormality. SINUSES: The visualized paranasal sinuses and mastoid air cells demonstrate no acute abnormality. SOFT TISSUES/SKULL: No acute abnormality of the visualized skull or soft tissues.     No acute intracranial abnormality. No significant interval change of the past 4 days.       Discharge Exam:    HEENT: NCAT,  PERRLA, No JVD  Heart:  RRR, no murmurs, gallops, or rubs.  Lungs:  CTA bilaterally, no wheeze, rales or rhonchi  Abd: bowel sounds present, nontender, nondistended, no masses  Extrem:  No clubbing, cyanosis, or edema    Disposition: {disposition:51513}     Patient Condition at Discharge: ***    Patient Instructions:      Medication List        ASK your doctor about these medications      atorvastatin 10 MG tablet  Commonly known as: LIPITOR  Take 1 tablet by mouth nightly     Breo Ellipta 100-25 MCG/ACT inhaler  Generic drug: fluticasone furoate-vilanterol     diazePAM 10 MG tablet  Commonly known as: VALIUM  Take 1 tablet by mouth every 12 hours as needed for Anxiety     doxepin 50 MG capsule  Commonly known as: SINEQUAN     Eliquis 5 MG Tabs

## 2024-01-18 NOTE — CARE COORDINATION
Social Work/Discharge Planning:  [facility] called with pre-cert approval and good until Sunday 1/21.  Notified patient and charge nurse.  Will continue to follow.  Electronically signed by GUERO Garnett on 1/18/2024 at 10:03 AM

## 2024-01-18 NOTE — PLAN OF CARE
Problem: Discharge Planning  Goal: Discharge to home or other facility with appropriate resources  1/17/2024 2343 by Magalys Ferris RN  Outcome: Progressing  1/17/2024 1043 by Lucina Velarde RN  Outcome: Progressing     Problem: Safety - Adult  Goal: Free from fall injury  1/17/2024 2343 by Magalys Ferris RN  Outcome: Progressing  1/17/2024 1043 by Lucina Velarde RN  Outcome: Progressing     Problem: ABCDS Injury Assessment  Goal: Absence of physical injury  1/17/2024 2343 by Magalys Ferris RN  Outcome: Progressing  1/17/2024 1043 by Lucina Velarde RN  Outcome: Progressing     Problem: Pain  Goal: Verbalizes/displays adequate comfort level or baseline comfort level  1/17/2024 2343 by Magalys Ferris RN  Outcome: Progressing  1/17/2024 1043 by Lucina Velarde RN  Outcome: Progressing

## 2024-01-18 NOTE — CARE COORDINATION
Social Work/Discharge Planning:  Discharge order noted.  Informed patient of private pay cost for ambulette transport.  Patient requested for this worker to call her son Michele.  Called patient son Michele \"Bill\" (ph: [phone]) and provided him with an update.  Santhosh states he will  his mother between 4:00-5:00pm to transport to [facility].  [id number] completed. Updated RN and liabelkis Juarez with [facility] of discharge time.  Electronically signed by GUERO Garnett on 1/18/2024 at 12:02 PM

## 2024-01-19 ENCOUNTER — OUTSIDE SERVICES (OUTPATIENT)
Dept: PRIMARY CARE CLINIC | Age: 84
End: 2024-01-19
Payer: MEDICARE

## 2024-01-19 DIAGNOSIS — J41.8 MIXED SIMPLE AND MUCOPURULENT CHRONIC BRONCHITIS (HCC): ICD-10-CM

## 2024-01-19 DIAGNOSIS — R53.81 PHYSICAL DECONDITIONING: ICD-10-CM

## 2024-01-19 DIAGNOSIS — Z86.69 HISTORY OF EPILEPSY: Chronic | ICD-10-CM

## 2024-01-19 DIAGNOSIS — R56.9 SEIZURE-LIKE ACTIVITY (HCC): Primary | ICD-10-CM

## 2024-01-19 DIAGNOSIS — Z91.81 AT MODERATE RISK FOR FALL: ICD-10-CM

## 2024-01-19 DIAGNOSIS — I10 ESSENTIAL HYPERTENSION: ICD-10-CM

## 2024-01-19 DIAGNOSIS — E03.9 ACQUIRED HYPOTHYROIDISM: Chronic | ICD-10-CM

## 2024-01-19 DIAGNOSIS — I26.99 BILATERAL PULMONARY EMBOLISM (HCC): ICD-10-CM

## 2024-01-19 DIAGNOSIS — E66.9 OBESITY (BMI 30-39.9): Chronic | ICD-10-CM

## 2024-01-19 DIAGNOSIS — I38 HEART VALVE DISEASE: ICD-10-CM

## 2024-01-19 DIAGNOSIS — R53.1 GENERALIZED WEAKNESS: ICD-10-CM

## 2024-01-19 DIAGNOSIS — Z86.718 HISTORY OF DVT (DEEP VEIN THROMBOSIS): Chronic | ICD-10-CM

## 2024-01-19 LAB
ALBUMIN SERPL-MCNC: 3.6 G/DL (ref 3.5–5.2)
ALP SERPL-CCNC: 187 U/L (ref 35–104)
ALT SERPL-CCNC: 26 U/L (ref 0–32)
ANION GAP SERPL CALCULATED.3IONS-SCNC: 13 MMOL/L (ref 7–16)
AST SERPL-CCNC: 31 U/L (ref 0–31)
BILIRUB SERPL-MCNC: 0.3 MG/DL (ref 0–1.2)
BUN SERPL-MCNC: 16 MG/DL (ref 6–23)
CALCIUM SERPL-MCNC: 9.3 MG/DL (ref 8.6–10.2)
CHLORIDE SERPL-SCNC: 105 MMOL/L (ref 98–107)
CHOLEST SERPL-MCNC: 152 MG/DL
CO2 SERPL-SCNC: 25 MMOL/L (ref 22–29)
CREAT SERPL-MCNC: 0.7 MG/DL (ref 0.5–1)
ERYTHROCYTE [DISTWIDTH] IN BLOOD BY AUTOMATED COUNT: 13.1 % (ref 11.5–15)
GFR SERPL CREATININE-BSD FRML MDRD: >60 ML/MIN/1.73M2
GLUCOSE SERPL-MCNC: 89 MG/DL (ref 74–99)
HCT VFR BLD AUTO: 42.6 % (ref 34–48)
HDLC SERPL-MCNC: 56 MG/DL
HGB BLD-MCNC: 13.7 G/DL (ref 11.5–15.5)
LDLC SERPL CALC-MCNC: 78 MG/DL
MCH RBC QN AUTO: 29.9 PG (ref 26–35)
MCHC RBC AUTO-ENTMCNC: 32.2 G/DL (ref 32–34.5)
MCV RBC AUTO: 93 FL (ref 80–99.9)
PLATELET # BLD AUTO: 178 K/UL (ref 130–450)
PMV BLD AUTO: 10.9 FL (ref 7–12)
POTASSIUM SERPL-SCNC: 4 MMOL/L (ref 3.5–5)
PROT SERPL-MCNC: 7.1 G/DL (ref 6.4–8.3)
RBC # BLD AUTO: 4.58 M/UL (ref 3.5–5.5)
SODIUM SERPL-SCNC: 143 MMOL/L (ref 132–146)
TRIGL SERPL-MCNC: 89 MG/DL
TSH SERPL DL<=0.05 MIU/L-ACNC: 5.78 UIU/ML (ref 0.27–4.2)
VLDLC SERPL CALC-MCNC: 18 MG/DL
WBC OTHER # BLD: 6.8 K/UL (ref 4.5–11.5)

## 2024-01-19 PROCEDURE — 99305 1ST NF CARE MODERATE MDM 35: CPT | Performed by: STUDENT IN AN ORGANIZED HEALTH CARE EDUCATION/TRAINING PROGRAM

## 2024-01-19 NOTE — PROGRESS NOTES
Lucinda Guadarrama (:  1940) is a 83 y.o. female.    Subjective   SUBJECTIVE/OBJECTIVE:  Past Medical History:   Diagnosis Date    Anxiety and depression 2016    Arthritis     osteo    Asthma     has chronic couch    Chronic back pain     Chronic kidney disease (CKD), stage II (mild) 2012    Creatinine around 1.3 to 1.4 in 2012 secondary to IV antibiotics following back surgery.    Chronic osteomyelitis of lumbar spine (HCC)     note on chart from Dr. Centeno dated 2017    Foot pain     after back surgery / chronic    HTN (hypertension), benign 1/10/2015    Hx of blood clots      / DVT, PE    Hx of migraines     Hyperlipidemia     Hypothyroidism     Hypothyroidism 2016    Neuropathy of leg     bilateral    Other postoperative infection 2011    Was on Vancomycin following the surgery for the removal of lumbar cysts. Patient on antibiotics for life     Radiculopathy, lumbar region 2012    Seizures (HCC)     last seizure 2016 / gran mal    Sleeping difficulties     takes ambien    Spinal stenosis of lumbar region     Synovial cyst of lumbar spine 2011    Thrombocyt[facility] ([facility]) 2016    follows only with PCP    Tremors of nervous system     hands    Vitamin D deficiency     Wears dentures     upper      Past Surgical History:   Procedure Laterality Date    BACK SURGERY      multiple    CERVICAL FUSION  ?     SECTION      CHOLECYSTECTOMY  's    lap    COLONOSCOPY      EYE SURGERY Bilateral     cataract extraction    JOINT REPLACEMENT      left    KNEE ARTHROPLASTY Right 2017    Total right knee arthroplasty    LUMBAR LAMINECTOMY  1985    [place] COLONOSCOPY FLX DX W/COLLJ SPEC WHEN PFRMD N/A 6/15/2018    COLONOSCOPY DIAGNOSTIC performed by Brandan Dias MD at [facility] ENDOSCOPY    [place] EGD TRANSORAL BIOPSY SINGLE/MULTIPLE N/A 6/15/2018    EGD ESOPHAGOGASTRODUODENOSCOPY performed by Brandan Dias MD at [facility] ENDOSCOPY    SPINE SURGERY  1985 &

## 2024-01-20 LAB
DATE LAST DOSE: ABNORMAL
PHENYTOIN DOSE: ABNORMAL MG
PHENYTOIN SERPL-MCNC: 22.1 UG/ML (ref 10–20)
TME LAST DOSE: ABNORMAL H

## 2024-01-25 LAB
ALBUMIN SERPL-MCNC: 3.6 G/DL (ref 3.5–5.2)
ALP SERPL-CCNC: 178 U/L (ref 35–104)
ALT SERPL-CCNC: 24 U/L (ref 0–32)
ANION GAP SERPL CALCULATED.3IONS-SCNC: 10 MMOL/L (ref 7–16)
AST SERPL-CCNC: 30 U/L (ref 0–31)
BILIRUB SERPL-MCNC: 0.3 MG/DL (ref 0–1.2)
BUN SERPL-MCNC: 23 MG/DL (ref 6–23)
CALCIUM SERPL-MCNC: 9.3 MG/DL (ref 8.6–10.2)
CHLORIDE SERPL-SCNC: 104 MMOL/L (ref 98–107)
CO2 SERPL-SCNC: 25 MMOL/L (ref 22–29)
CREAT SERPL-MCNC: 0.8 MG/DL (ref 0.5–1)
ERYTHROCYTE [DISTWIDTH] IN BLOOD BY AUTOMATED COUNT: 13 % (ref 11.5–15)
GFR SERPL CREATININE-BSD FRML MDRD: >60 ML/MIN/1.73M2
GLUCOSE SERPL-MCNC: 95 MG/DL (ref 74–99)
HCT VFR BLD AUTO: 40.5 % (ref 34–48)
HGB BLD-MCNC: 13.5 G/DL (ref 11.5–15.5)
MCH RBC QN AUTO: 30.8 PG (ref 26–35)
MCHC RBC AUTO-ENTMCNC: 33.3 G/DL (ref 32–34.5)
MCV RBC AUTO: 92.3 FL (ref 80–99.9)
PLATELET # BLD AUTO: 202 K/UL (ref 130–450)
PMV BLD AUTO: 10.6 FL (ref 7–12)
POTASSIUM SERPL-SCNC: 4.3 MMOL/L (ref 3.5–5)
PROT SERPL-MCNC: 6.9 G/DL (ref 6.4–8.3)
RBC # BLD AUTO: 4.39 M/UL (ref 3.5–5.5)
SODIUM SERPL-SCNC: 139 MMOL/L (ref 132–146)
WBC OTHER # BLD: 5.9 K/UL (ref 4.5–11.5)

## 2024-01-25 NOTE — PROGRESS NOTES
[facility]   Progress note      Subjective:    The patient is somnolent, does not rouse during rounds. No family present.   No acute events overnight.        Objective:    /70   Pulse 75   Temp 97.8 °F (36.6 °C) (Oral)   Resp 16   Ht 1.524 m (5')   Wt 83.4 kg (183 lb 14.4 oz)   SpO2 93%   BMI 35.92 kg/m²     In: -   Out: 500   In: -   Out: 500 [Urine:500]    General appearance: NAD, becky somnolent  HEENT: AT/NC, MMM  Neck: FROM, supple  Lungs: Clear to auscultation  CV: RRR, no MRGs  Vasc: Radial pulses 2+  Abdomen: Soft, non-tender; no masses or HSM  Extremities: No peripheral edema or digital cyanosis  Skin: no rash, lesions or ulcers  Psych: becky d/t somnolence  Neuro: no tremors, no seizures, becky d/t somnolence     Recent Labs     01/15/24  1224 01/16/24  0633 01/17/24  0438   WBC 7.3 5.8 5.9   HGB 15.0 13.6 13.0   HCT 45.5 41.9 40.7    156 161       Recent Labs     01/15/24  1224 01/16/24  0633 01/17/24  0438    141 139   K 4.8 4.2 3.9    106 106   CO2 27 26 26   BUN 18 13 11   CREATININE 0.8 0.6 0.7   CALCIUM 9.2 9.0 8.9       Assessment:    Principal Problem:    Seizure-like activity (HCC)  Resolved Problems:    * No resolved hospital problems. *      Plan:    Seizure like activity   -Monitor labs  -Continue home Dilantin 100 mg twice daily  -Seizure precautions  -PT OT for discharge disposition     1/17/2024  --reviewed notes from case management for placement, awaiting pre-cert/decision/acceptance  --reviewed dietary note for ons  --per pt note from yesterday, score is 16/24  --labs and vitals stable    Medication for other comorbidities continue as appropriate dose adjustment as necessary.     DVT prophylaxis Eliquis   PT OT  Discharge planning     Code status: Full  Requires Inpatient level of care      I have spent a total time of 25 minutes of this patient encounter reviewing chart, labs, radiological reports coordinating care with interdisciplinary 
  Physician Progress Note      PATIENT:               SARAH HARRINGTON  [facility] #:                  [id number]  :                       1940  ADMIT DATE:       1/15/2024 11:48 AM  DISCH DATE:        2024 4:00 PM  RESPONDING  PROVIDER #:        Jayashree Nava DO          QUERY TEXT:    Pt admitted with possible seizure-like activity noted in ED.  If possible,   please document in progress notes and discharge summary the relationship, if   any, between seizure-like activity and seizure.  [Seizure-like activity due to::This patient has Seizure-like activity due to   ##please specify.]]    The medical record reflects the following:  Risk Factors: Hx of seizures, recent Dilantin dosage change  Clinical Indicators: ED Note 1/15: \"... Patient states that she had shaking   motions of the upper extremities bilaterally.  She states that she recalls the   event...Patient has a history of seizures and is on Dilantin.  They recently   increased her dose of this medication.\"  Treatment: Dilantin, IV Mg, Head CT      FLORIDALMA Jo, RN, [facility]  [phone]  Options provided:  -- Seizure-like activity due to seizure disorder  -- Other - I will add my own diagnosis  -- Disagree - Not applicable / Not valid  -- Disagree - Clinically unable to determine / Unknown  -- Refer to Clinical Documentation Reviewer    PROVIDER RESPONSE TEXT:    This patient has Seizure-like activity due to seizure disorder.    Query created by: Bennie Richard on 2024 3:02 PM      Electronically signed by:  Jayashree Nava DO 2024 7:06 PM          
24 hr chart check complete.  
24 hr chart check complete.  
4 Eyes Skin Assessment     NAME:  Lucinda Guadarrama  YOB: 1940  MEDICAL RECORD NUMBER:  [id number]    The patient is being assessed for  Admission    I agree that at least one RN has performed a thorough Head to Toe Skin Assessment on the patient. ALL assessment sites listed below have been assessed.      Areas assessed by both nurses:    Head, Face, Ears, Shoulders, Back, Chest, Arms, Elbows, Hands, Sacrum. Buttock, Coccyx, Ischium, Legs. Feet and Heels, and Under Medical Devices         Does the Patient have a Wound? No noted wound(s)       Santos Prevention initiated by RN: No  Wound Care Orders initiated by RN: No    Pressure Injury (Stage 3,4, Unstageable, DTI, NWPT, and Complex wounds) if present, place Wound referral order by RN under : No    New Ostomies, if present place, Ostomy referral order under : No     Nurse 1 eSignature: Electronically signed by Molly Lilly RN on 1/16/24 at 1:59 AM EST    **SHARE this note so that the co-signing nurse can place an eSignature**    Nurse 2 eSignature: {Esignature:[id number]}    
Nurse to nurse given to Liz at [facility]. AVS faxed to Liz at [phone].  
OCCUPATIONAL THERAPY INITIAL EVALUATION    [facility]   [address] [facility]        Date:2024                                                  Patient Name: Lucinda Guadarrama    MRN: [id number]    : 1940    Room: [address] [place] [address]     Evaluating OT: Marie Curtis OTR/L #[id number]    Referring Provider:  Vita Luis APRN - CNP     Specific Provider Orders/Date:  OT Eval and Treat, 1/15/24     Diagnosis:   1. Seizure-like activity (HCC)         Surgery: None      Pertinent Medical History: Neuropathy B LEs, spinal stenosis of lumbar region, radiculopathy, seizures, anxiety/depression, arthritis, HTN, migraines, multiple back surgeries, cervical fusion      Precautions:  Fall Risk, alarm, seizure precautions      Assessment of current deficits    [x] Functional mobility  [x]ADLs  [x] Strength               []Cognition    [x] Functional transfers   [x] IADLs         [x] Safety Awareness   [x]Endurance    [] Fine Coordination              [x] Balance      [] Vision/perception   []Sensation     []Gross Motor Coordination  [] ROM  [] Delirium                   [x] Motor Control     OT PLAN OF CARE   OT POC based on physician orders, patient diagnosis and results of clinical assessment    Frequency/Duration 1-3 days/wk for 2 weeks PRN     Specific OT Treatment Interventions to include:   * Instruction/training on adapted ADL techniques and AE recommendations to increase functional independence within precautions       * Training on energy conservation strategies, correct breathing pattern and techniques to improve independence/tolerance for self-care routine  * Functional transfer/mobility training/DME recommendations for increased independence, safety, and fall prevention  * Patient/Family education to increase follow through with safety techniques and functional independence  * Recommendation of environmental modifications for increased safety 
3835    
MIN A to stand from EOB and from armed chair Independent   Functional Mobility Minimal Assist with wheeled walker to improve balance few steps forward and 1 step backward.     CGA with FWW within room and hallway, B knees buckled near end of task with MIN A to correct Modified [place] with use of AAD    Balance Sitting:     Static: fair plus     Dynamic: fair plus   Standing: fair with walker. B knee buckle prior to sitting into chair.      sitting: SBA  Standing: CGA Sitting:     Static: good    Dynamic: good  Standing: fair plus/good    Activity Tolerance fair    fair Increase standing tolerance >3 minutes for improved engagement with functional transfers and indep in ADLs      Visual/  Perceptual Glasses: N/A      NA      Comments: RN approved patient's participation in OOB activities. Upon arrival, patient supine, family present. Patient participated in bed mobility, self care, functional transfers and functional mobility with cues and assistance as needed. Patient required education on recognizing signs of fatigue as well as energy conservation with verbal understanding. At end of session, patient seated in armed chair with call light and phone within reach, family present and all lines and tubes intact. Alarm on. Patient continues to be limited by decreased strength, balance, functional activity tolerance and safety awareness which is impacting patient's ability to safely and independently complete self care and functional tasks.     Treatment: OT treatment provided this date included:   Instruction/training on safety and adapted techniques for completion of ADLs.    Instruction/training on safe functional mobility/transfer techniques.    Instruction/training on energy conservation/work simplification for completion of ADLs.      Further skilled OT treatment indicated to increase patient's safety and independence with completion of ADL/IADL tasks in order to maximize patient's functional independence and 
care.  yes    PHYSICAL THERAPY PLAN OF CARE:    PT POC is established based on physician order and patient diagnosis     Diagnosis:  Seizure-like activity (HCC) [R56.9]    Current Treatment Recommendations:     [x] Strengthening to improve independence with functional mobility   [] ROM to improve independence with functional mobility   [x] Balance Training to improve static/dynamic balance and to reduce fall risk  [x] Endurance Training to improve activity tolerance during functional mobility   [x] Transfer Training to improve safety and independence with all functional transfers   [x] Gait Training to improve gait mechanics, endurance and assess need for appropriate assistive device  [] Stair Training in preparation for safe discharge home and/or into the community   [] Positioning to prevent skin breakdown and contractures  [x] Safety and Education Training   [x] Patient/Caregiver Education   [] HEP  [] Other     PT long term treatment goals are located in above grid    Frequency of treatments: 2-5x/week x 1-2 weeks.    Time in  1051  Time out  1101    Evaluation Time includes thorough review of current medical information, gathering information on past medical history/social history and prior level of function, completion of standardized testing/informal observation of tasks, assessment of data and education on plan of care and goals.    CPT codes:  [x] Low Complexity PT evaluation 26916  [] Moderate Complexity PT evaluation 76055  [] High Complexity PT evaluation 19801  [] PT Re-evaluation 78303  [] Therapeutic activities 30009 __minutes  [] Therapeutic exercises 35598 __ minutes      Christina Sullivan, PT, DPT  PT [id number]

## 2024-04-12 ENCOUNTER — TELEPHONE (OUTPATIENT)
Dept: ADMINISTRATIVE | Age: 84
End: 2024-04-12

## 2024-04-12 NOTE — TELEPHONE ENCOUNTER
Pt's pcp (Marvin Briggs) prescribed Remeron for her this week.  Her son is concerned because of the side effects due to her epilepsy and other medications.  He requested to speak with Margo.  Staff unavailable due to pt care.  Please contact pt's son.

## 2024-04-26 DIAGNOSIS — G40.109 PARTIAL SYMPTOMATIC EPILEPSY WITH SIMPLE PARTIAL SEIZURES, NOT INTRACTABLE, WITHOUT STATUS EPILEPTICUS (HCC): ICD-10-CM

## 2024-04-26 RX ORDER — PROPRANOLOL HYDROCHLORIDE 20 MG/1
TABLET ORAL
Qty: 105 TABLET | Refills: 5 | Status: SHIPPED | OUTPATIENT
Start: 2024-04-26

## 2024-04-26 RX ORDER — CLOBAZAM 10 MG/1
5 TABLET ORAL DAILY
Qty: 15 TABLET | Refills: 2 | Status: SHIPPED | OUTPATIENT
Start: 2024-04-26 | End: 2024-07-25

## 2024-05-02 ENCOUNTER — HOSPITAL ENCOUNTER (EMERGENCY)
Age: 84
Discharge: HOME | End: 2024-05-02
Payer: MEDICARE

## 2024-05-02 ENCOUNTER — APPOINTMENT (OUTPATIENT)
Dept: CT IMAGING | Age: 84
End: 2024-05-02
Payer: MEDICARE

## 2024-05-02 VITALS
BODY MASS INDEX: 31.02 KG/M2 | HEIGHT: 60 IN | DIASTOLIC BLOOD PRESSURE: 69 MMHG | OXYGEN SATURATION: 95 % | TEMPERATURE: 98.4 F | WEIGHT: 158 LBS | RESPIRATION RATE: 16 BRPM | SYSTOLIC BLOOD PRESSURE: 108 MMHG | HEART RATE: 86 BPM

## 2024-05-02 DIAGNOSIS — Z79.01 CHRONIC ANTICOAGULATION: ICD-10-CM

## 2024-05-02 DIAGNOSIS — S00.03XA SCALP HEMATOMA, INITIAL ENCOUNTER: Primary | ICD-10-CM

## 2024-05-02 DIAGNOSIS — S09.90XA CLOSED HEAD INJURY, INITIAL ENCOUNTER: ICD-10-CM

## 2024-05-02 DIAGNOSIS — S61.411A LACERATION OF RIGHT HAND WITHOUT FOREIGN BODY, INITIAL ENCOUNTER: ICD-10-CM

## 2024-05-02 DIAGNOSIS — Z23 NEED FOR TETANUS BOOSTER: ICD-10-CM

## 2024-05-02 PROCEDURE — 6360000002 HC RX W HCPCS

## 2024-05-02 PROCEDURE — 90714 TD VACC NO PRESV 7 YRS+ IM: CPT

## 2024-05-02 PROCEDURE — 90471 IMMUNIZATION ADMIN: CPT

## 2024-05-02 PROCEDURE — 6370000000 HC RX 637 (ALT 250 FOR IP)

## 2024-05-02 PROCEDURE — 70450 CT HEAD/BRAIN W/O DYE: CPT

## 2024-05-02 PROCEDURE — 12002 RPR S/N/AX/GEN/TRNK2.6-7.5CM: CPT

## 2024-05-02 PROCEDURE — 2500000003 HC RX 250 WO HCPCS

## 2024-05-02 PROCEDURE — 99284 EMERGENCY DEPT VISIT MOD MDM: CPT

## 2024-05-02 RX ORDER — SILICONES/ADHESIVE TAPE
1 COMBINATION PACKAGE (EA) TOPICAL 3 TIMES DAILY
Qty: 14.2 G | Refills: 0 | Status: SHIPPED | OUTPATIENT
Start: 2024-05-02

## 2024-05-02 RX ORDER — GINSENG 100 MG
CAPSULE ORAL ONCE
Status: COMPLETED | OUTPATIENT
Start: 2024-05-02 | End: 2024-05-02

## 2024-05-02 RX ORDER — LIDOCAINE HYDROCHLORIDE AND EPINEPHRINE 10; 10 MG/ML; UG/ML
20 INJECTION, SOLUTION INFILTRATION; PERINEURAL ONCE
Status: COMPLETED | OUTPATIENT
Start: 2024-05-02 | End: 2024-05-02

## 2024-05-02 RX ORDER — ACETAMINOPHEN 325 MG/1
650 TABLET ORAL ONCE
Status: COMPLETED | OUTPATIENT
Start: 2024-05-02 | End: 2024-05-02

## 2024-05-02 RX ADMIN — BACITRACIN: 500 OINTMENT TOPICAL at 23:07

## 2024-05-02 RX ADMIN — CLOSTRIDIUM TETANI TOXOID ANTIGEN (FORMALDEHYDE INACTIVATED) AND CORYNEBACTERIUM DIPHTHERIAE TOXOID ANTIGEN (FORMALDEHYDE INACTIVATED) 0.5 ML: 5; 2 INJECTION, SUSPENSION INTRAMUSCULAR at 19:30

## 2024-05-02 RX ADMIN — LIDOCAINE HYDROCHLORIDE AND EPINEPHRINE 20 ML: 10; 10 INJECTION, SOLUTION INFILTRATION; PERINEURAL at 23:07

## 2024-05-02 RX ADMIN — ACETAMINOPHEN 650 MG: 325 TABLET ORAL at 19:29

## 2024-05-02 ASSESSMENT — PAIN - FUNCTIONAL ASSESSMENT: PAIN_FUNCTIONAL_ASSESSMENT: 0-10

## 2024-05-02 ASSESSMENT — PAIN SCALES - GENERAL: PAINLEVEL_OUTOF10: 7

## 2024-05-02 NOTE — ED PROVIDER NOTES
mg Oral Given 5/2/24 1929)        Re-examination:  5/2/24       Time: 2300            Symptoms improving.  Remains alert and orient x 4 no distress.  Reviewed results and plan of care.    Consult(s):   None    Procedure(s):      LACERATION REPAIR  PROCEDURE NOTE:  Unless otherwise indicated, this procedure was done or directly supervised by the ED attending. Performed By: myself.    Laceration #: 1.  Location: right 2nd MCP  Length: 3.6 cm.  The wound area was cleansed with povidone iodine, cleansend with shur-clens and draped in a sterile fashion.  The wound area was anesthetized with Lidocaine 1% with epinephrine.  WOUND COMPLEXITY:    Debridement: None.  Undermining: None.  Wound Margins Revised: None.  Flaps Aligned: no.  The wound was explored with the following results no foreign body or tendon injury seen.  The wound was closed with 4-0 Ethilon using interrupted sutures.  Dressing:  bacitracin and a sterile dressing was placed.    Total number suture: 5    MDM:     This is a well-appearing 84-year-old female currently anticoagulated with Eliquis due to history of DVT and PE who presents to the ER for evaluation of a head injury and right hand laceration following a fall due to need to form losing her balance at home.  Unfortunately, she struck her head on a [place] cabinet during the fall and cut her right second MCP joint area on open can of cat food.  She complains of a small cephalohematoma and laceration to the right radial second MCP joint area with mild pain.  No loss of consciousness, neck pain, dizziness, vision changes, nausea, vomiting, paresthesias, or focal weakness.  Tetanus vaccine status is greater than 10 years.  There is a small cephalohematoma to the right upper frontal scalp with focal tenderness and a laceration to the right hand radial aspect second MCP joint area with full range of motion with some pain.  Good resistance to flexion extension at elbow, wrist, and all digits of right hand.

## 2024-07-10 ENCOUNTER — TELEPHONE (OUTPATIENT)
Dept: NEUROLOGY | Age: 84
End: 2024-07-10

## 2024-07-17 ENCOUNTER — TELEPHONE (OUTPATIENT)
Dept: NEUROLOGY | Age: 84
End: 2024-07-17

## 2024-09-22 ENCOUNTER — APPOINTMENT (OUTPATIENT)
Dept: GENERAL RADIOLOGY | Age: 84
End: 2024-09-22
Payer: MEDICARE

## 2024-09-22 ENCOUNTER — HOSPITAL ENCOUNTER (EMERGENCY)
Age: 84
Discharge: HOME | End: 2024-09-22
Payer: MEDICARE

## 2024-09-22 VITALS
HEART RATE: 98 BPM | WEIGHT: 160 LBS | BODY MASS INDEX: 31.41 KG/M2 | SYSTOLIC BLOOD PRESSURE: 135 MMHG | HEIGHT: 60 IN | TEMPERATURE: 99.1 F | OXYGEN SATURATION: 100 % | DIASTOLIC BLOOD PRESSURE: 91 MMHG | RESPIRATION RATE: 16 BRPM

## 2024-09-22 DIAGNOSIS — S60.021A CONTUSION OF RIGHT INDEX FINGER WITHOUT DAMAGE TO NAIL, INITIAL ENCOUNTER: ICD-10-CM

## 2024-09-22 DIAGNOSIS — S67.10XA CRUSHING INJURY OF DISTAL FINGER, INITIAL ENCOUNTER: Primary | ICD-10-CM

## 2024-09-22 DIAGNOSIS — S61.210A LACERATION OF RIGHT INDEX FINGER WITHOUT FOREIGN BODY WITHOUT DAMAGE TO NAIL, INITIAL ENCOUNTER: ICD-10-CM

## 2024-09-22 PROCEDURE — 12001 RPR S/N/AX/GEN/TRNK 2.5CM/<: CPT

## 2024-09-22 PROCEDURE — 6370000000 HC RX 637 (ALT 250 FOR IP)

## 2024-09-22 PROCEDURE — 2500000003 HC RX 250 WO HCPCS

## 2024-09-22 PROCEDURE — 73130 X-RAY EXAM OF HAND: CPT

## 2024-09-22 PROCEDURE — 99283 EMERGENCY DEPT VISIT LOW MDM: CPT

## 2024-09-22 RX ORDER — ACETAMINOPHEN 325 MG/1
650 TABLET ORAL ONCE
Status: COMPLETED | OUTPATIENT
Start: 2024-09-22 | End: 2024-09-22

## 2024-09-22 RX ORDER — LIDOCAINE HYDROCHLORIDE 10 MG/ML
5 INJECTION, SOLUTION EPIDURAL; INFILTRATION; INTRACAUDAL; PERINEURAL ONCE
Status: COMPLETED | OUTPATIENT
Start: 2024-09-22 | End: 2024-09-22

## 2024-09-22 RX ORDER — OXYCODONE HYDROCHLORIDE 5 MG/1
5 TABLET ORAL ONCE
Status: COMPLETED | OUTPATIENT
Start: 2024-09-22 | End: 2024-09-22

## 2024-09-22 RX ORDER — GINSENG 100 MG
CAPSULE ORAL ONCE
Status: COMPLETED | OUTPATIENT
Start: 2024-09-22 | End: 2024-09-22

## 2024-09-22 RX ADMIN — OXYCODONE 5 MG: 5 TABLET ORAL at 20:26

## 2024-09-22 RX ADMIN — BACITRACIN: 500 OINTMENT TOPICAL at 18:55

## 2024-09-22 RX ADMIN — ACETAMINOPHEN 650 MG: 325 TABLET ORAL at 18:51

## 2024-09-22 RX ADMIN — LIDOCAINE HYDROCHLORIDE 5 ML: 10 INJECTION, SOLUTION EPIDURAL; INFILTRATION; INTRACAUDAL; PERINEURAL at 18:53

## 2024-09-22 ASSESSMENT — PAIN SCALES - GENERAL
PAINLEVEL_OUTOF10: 10
PAINLEVEL_OUTOF10: 10

## 2024-09-22 ASSESSMENT — PAIN DESCRIPTION - DESCRIPTORS: DESCRIPTORS: ACHING

## 2024-09-22 ASSESSMENT — PAIN - FUNCTIONAL ASSESSMENT: PAIN_FUNCTIONAL_ASSESSMENT: 0-10

## 2024-09-22 ASSESSMENT — PAIN DESCRIPTION - LOCATION
LOCATION: FINGER (COMMENT WHICH ONE)
LOCATION: FINGER (COMMENT WHICH ONE)

## 2024-09-22 ASSESSMENT — PAIN DESCRIPTION - ORIENTATION: ORIENTATION: RIGHT

## 2024-12-04 DIAGNOSIS — G40.109 PARTIAL SYMPTOMATIC EPILEPSY WITH SIMPLE PARTIAL SEIZURES, NOT INTRACTABLE, WITHOUT STATUS EPILEPTICUS (HCC): ICD-10-CM

## 2024-12-04 RX ORDER — CLOBAZAM 10 MG/1
5 TABLET ORAL DAILY
Qty: 15 TABLET | Refills: 2 | Status: SHIPPED | OUTPATIENT
Start: 2024-12-04 | End: 2025-03-04

## 2024-12-04 RX ORDER — DONEPEZIL HYDROCHLORIDE 10 MG/1
10 TABLET, FILM COATED ORAL DAILY
Qty: 90 TABLET | Refills: 3 | Status: SHIPPED | OUTPATIENT
Start: 2024-12-04

## 2024-12-04 NOTE — TELEPHONE ENCOUNTER
Last seen 1/3/2024  Next appt Visit date not found    Requested Prescriptions     Pending Prescriptions Disp Refills    donepezil (ARICEPT) 10 MG tablet 90 tablet 3     Sig: Take 1 tablet by mouth daily    cloBAZam (ONFI) 10 MG TABS tablet 15 tablet 2     Sig: Take 0.5 tablets by mouth daily for 90 days. Max Daily Amount: 5 mg        Updated pharmacy

## 2025-01-16 ENCOUNTER — APPOINTMENT (OUTPATIENT)
Dept: ULTRASOUND IMAGING | Age: 85
End: 2025-01-16
Payer: MEDICARE

## 2025-01-16 ENCOUNTER — HOSPITAL ENCOUNTER (EMERGENCY)
Age: 85
Discharge: HOME | End: 2025-01-16
Payer: MEDICARE

## 2025-01-16 VITALS
TEMPERATURE: 97.5 F | DIASTOLIC BLOOD PRESSURE: 63 MMHG | OXYGEN SATURATION: 97 % | WEIGHT: 152 LBS | RESPIRATION RATE: 16 BRPM | SYSTOLIC BLOOD PRESSURE: 114 MMHG | BODY MASS INDEX: 25.33 KG/M2 | HEART RATE: 80 BPM | HEIGHT: 65 IN

## 2025-01-16 DIAGNOSIS — I83.90 VARICOSE VEINS OF CALF: Primary | ICD-10-CM

## 2025-01-16 PROCEDURE — 93971 EXTREMITY STUDY: CPT

## 2025-01-16 PROCEDURE — 99284 EMERGENCY DEPT VISIT MOD MDM: CPT

## 2025-01-16 ASSESSMENT — PAIN - FUNCTIONAL ASSESSMENT: PAIN_FUNCTIONAL_ASSESSMENT: NONE - DENIES PAIN

## 2025-01-16 NOTE — ED PROVIDER NOTES
Independent NINI Visit.           [facility] [place] EMERGENCY DEPARTMENT  ED  Encounter Note  Admit Date/RoomTime: 2025 11:52 AM  ED Room: DISPO/D02  NAME: Lucinda Guadarrama  : 1940  MRN: [id number]  PCP: Marvin Briggs MD    CHIEF COMPLAINT     Ankle Problem (Distended veins to right lower extremity.  Fell in kitchen 10 days ago. Concerned for blood clot, takes thinners for hx of PE.)    HISTORY OF PRESENT ILLNESS        Lucinda Guadarrama is a 84 y.o. female who presents to the ED by private vehicle for swelling and concern for DVT right LE, beginning a few day(s) ago. The complaint has been persistent and are mild in severity.  The patient states that she fell at home over a week ago striking the right side of her face.  States that she never came to be seen at the time but just use some ice packs and waited to see what happened.  She reports that everything improved but she started to notice some swelling around her right ankle and some engorged varicose veins in the right lower extremity.  The patient denies any pain in the right foot ankle or leg.  She is ambulating without issue.  Denies headache, vomiting or fever.  She does have a history of pulmonary emboli and is on blood thinners.      REVIEW OF SYSTEMS     Pertinent positives and negatives are stated within HPI, all other systems reviewed and are negative.    Past Medical History:  has a past medical history of Anxiety and depression, Arthritis, Asthma, Chronic back pain, Chronic kidney disease (CKD), stage II (mild), Chronic osteomyelitis of lumbar spine, Foot pain, HTN (hypertension), benign, Hx of blood clots, Hx of migraines, Hyperlipidemia, Hypothyroidism, Hypothyroidism, Neuropathy of leg, Other postoperative infection, Radiculopathy, lumbar region, Seizures (HCC), Sleeping difficulties, Spinal stenosis of lumbar region, Synovial cyst of lumbar spine, Thrombocytopenia (HCC), Tremors of nervous system, Vitamin D

## 2025-05-22 ENCOUNTER — HOSPITAL ENCOUNTER (EMERGENCY)
Age: 85
Discharge: HOME | End: 2025-05-22
Attending: EMERGENCY MEDICINE
Payer: MEDICARE

## 2025-05-22 ENCOUNTER — APPOINTMENT (OUTPATIENT)
Dept: GENERAL RADIOLOGY | Age: 85
End: 2025-05-22
Payer: MEDICARE

## 2025-05-22 VITALS
HEIGHT: 60 IN | BODY MASS INDEX: 29.06 KG/M2 | SYSTOLIC BLOOD PRESSURE: 126 MMHG | RESPIRATION RATE: 15 BRPM | OXYGEN SATURATION: 97 % | HEART RATE: 77 BPM | DIASTOLIC BLOOD PRESSURE: 61 MMHG | TEMPERATURE: 98 F | WEIGHT: 148 LBS

## 2025-05-22 DIAGNOSIS — S80.01XA CONTUSION OF RIGHT KNEE, INITIAL ENCOUNTER: Primary | ICD-10-CM

## 2025-05-22 PROCEDURE — 73562 X-RAY EXAM OF KNEE 3: CPT

## 2025-05-22 PROCEDURE — 99283 EMERGENCY DEPT VISIT LOW MDM: CPT

## 2025-05-22 ASSESSMENT — PAIN SCALES - GENERAL: PAINLEVEL_OUTOF10: 7

## 2025-05-22 ASSESSMENT — PAIN DESCRIPTION - LOCATION: LOCATION: KNEE

## 2025-05-22 ASSESSMENT — PAIN DESCRIPTION - ORIENTATION: ORIENTATION: RIGHT

## 2025-05-22 NOTE — DISCHARGE INSTRUCTIONS
If you need more medication for pain, you can take your oxycodone with 2 regular strength Tylenol totaling 650 mg, or 1 extra strength Tylenol totaling 500 mg.  If this does not help, you can take an extra oxycodone from time to time if needed

## 2025-05-22 NOTE — ED PROVIDER NOTES
HPI:  25,   Time: 11:52 AM EDT         Lucinda Guadarrama is a 85 y.o. female presenting to the ED for evaluation of right knee pain.  Approximate 2 weeks ago the patient had a mechanical fall onto her right knee.  She had a knee replacement around 20 years ago.  She states she has had pain in her right knee since that time.  Pain is worse with ambulatory status and weightbearing.  Denies numbness or weakness.  Has been taking oxycodone 5 mg tablets with no significant improvement    ROS:   Pertinent positives and negatives are stated within HPI, all other systems reviewed and are negative.  --------------------------------------------- PAST HISTORY ---------------------------------------------  Past Medical History:  has a past medical history of Anxiety and depression, Arthritis, Asthma, Chronic back pain, Chronic kidney disease (CKD), stage II (mild), Chronic osteomyelitis of lumbar spine (HCC), Foot pain, HTN (hypertension), benign, Hx of blood clots, Hx of migraines, Hyperlipidemia, Hypothyroidism, Hypothyroidism, Neuropathy of leg, Other postoperative infection, Radiculopathy, lumbar region, Seizures (HCC), Sleeping difficulties, Spinal stenosis of lumbar region, Synovial cyst of lumbar spine, Thrombocytopenia, Tremors of nervous system, Vitamin D deficiency, and Wears dentures.    Past Surgical History:  has a past surgical history that includes lumbar laminectomy (); cervical fusion (?); joint replacement (); Spine surgery ( & ); Spine surgery (); Spine surgery (); Tonsillectomy; Colonoscopy;  section; Cholecystectomy ('s); eye surgery (Bilateral, ); back surgery; Spine surgery (); Knee Arthroplasty (Right, 2017); pr egd transoral biopsy single/multiple (N/A, 6/15/2018); and pr colonoscopy flx dx w/collj spec when pfrmd (N/A, 6/15/2018).    Social History:  reports that she has never smoked. She has never used smokeless tobacco. She reports that she

## 2025-06-26 DIAGNOSIS — G40.109 PARTIAL SYMPTOMATIC EPILEPSY WITH SIMPLE PARTIAL SEIZURES, NOT INTRACTABLE, WITHOUT STATUS EPILEPTICUS (HCC): ICD-10-CM

## 2025-06-26 RX ORDER — CLOBAZAM 10 MG/1
5 TABLET ORAL NIGHTLY
Qty: 45 TABLET | Refills: 0 | Status: SHIPPED | OUTPATIENT
Start: 2025-06-26 | End: 2025-09-24

## 2025-06-26 NOTE — TELEPHONE ENCOUNTER
Last seen 1/3/2024  Next appt Visit date not found    Requested Prescriptions     Pending Prescriptions Disp Refills    cloBAZam (ONFI) 10 MG TABS tablet [Pharmacy Med Name: CLOBAZAM 10 MG TABLET] 15 tablet 0     Sig: TAKE 1/2 TABLET BY MOUTH EVERY DAY AT BEDTIME       Plan:      Continue Onfi and Dilantin     Formal cognitive evaluation to be obtained-scheduled with [facility] therefore we will have them complete     Follow-up with me afterwards     Consider PET scan     Bryce Johnson, GENARO - CNS  4:01 PM  7/11/2024

## 2025-07-02 DIAGNOSIS — G40.109 PARTIAL SYMPTOMATIC EPILEPSY WITH SIMPLE PARTIAL SEIZURES, NOT INTRACTABLE, WITHOUT STATUS EPILEPTICUS (HCC): ICD-10-CM

## 2025-07-03 RX ORDER — CLOBAZAM 10 MG/1
TABLET ORAL
Qty: 15 TABLET | Refills: 2 | OUTPATIENT
Start: 2025-07-03 | End: 2025-08-02

## 2025-07-09 DIAGNOSIS — G40.109 PARTIAL SYMPTOMATIC EPILEPSY WITH SIMPLE PARTIAL SEIZURES, NOT INTRACTABLE, WITHOUT STATUS EPILEPTICUS (HCC): ICD-10-CM

## 2025-07-10 RX ORDER — CLOBAZAM 10 MG/1
TABLET ORAL
Qty: 15 TABLET | Refills: 0 | Status: SHIPPED | OUTPATIENT
Start: 2025-07-10 | End: 2025-08-09

## 2025-07-10 NOTE — TELEPHONE ENCOUNTER
Last seen 7/11/2024  Next appt Visit date not found    Requested Prescriptions     Pending Prescriptions Disp Refills    cloBAZam (ONFI) 10 MG TABS tablet [Pharmacy Med Name: CLOBAZAM 10 MG TABLET] 15 tablet 0     Sig: TAKE 1/2 TABLET BY MOUTH EVERY DAY AT BEDTIME      Plan:      Continue Onfi and Dilantin     Formal cognitive evaluation to be obtained-scheduled with [facility] therefore we will have them complete     Follow-up with me afterwards     Consider PET scan     Bryce Johnson, GENARO - CNS  4:01 PM  7/11/2024

## (undated) DEVICE — GRADUATE TRIANG MEASURE 1000ML BLK PRNT

## (undated) DEVICE — BLOCK BITE 60FR RUBBER ADLT DENTAL

## (undated) DEVICE — SPONGE GZ W4XL4IN RAYON POLY FILL CVR W/ NONWOVEN FAB